# Patient Record
Sex: MALE | Race: WHITE | Employment: OTHER | URBAN - METROPOLITAN AREA
[De-identification: names, ages, dates, MRNs, and addresses within clinical notes are randomized per-mention and may not be internally consistent; named-entity substitution may affect disease eponyms.]

---

## 2017-03-11 DIAGNOSIS — E78.00 HYPERCHOLESTEREMIA: ICD-10-CM

## 2017-03-11 DIAGNOSIS — I10 ESSENTIAL HYPERTENSION, BENIGN: Primary | ICD-10-CM

## 2017-03-13 RX ORDER — ATORVASTATIN CALCIUM 40 MG/1
TABLET, FILM COATED ORAL
Qty: 135 TABLET | Refills: 1 | Status: SHIPPED | OUTPATIENT
Start: 2017-03-13 | End: 2017-09-06

## 2017-03-13 NOTE — TELEPHONE ENCOUNTER
Contacted pt and advised him to do 12 hour fasting labs per [facility]. Pt stated understanding.

## 2017-03-24 ENCOUNTER — APPOINTMENT (OUTPATIENT)
Dept: LAB | Facility: HOSPITAL | Age: 72
End: 2017-03-24
Attending: INTERNAL MEDICINE
Payer: MEDICARE

## 2017-03-24 DIAGNOSIS — E78.00 HYPERCHOLESTEREMIA: ICD-10-CM

## 2017-03-24 DIAGNOSIS — I10 ESSENTIAL HYPERTENSION, BENIGN: ICD-10-CM

## 2017-03-24 LAB
ALBUMIN SERPL BCP-MCNC: 4 G/DL (ref 3.5–4.8)
ALBUMIN/GLOB SERPL: 1.3 {RATIO} (ref 1–2)
ALP SERPL-CCNC: 52 U/L (ref 32–100)
ALT SERPL-CCNC: 24 U/L (ref 17–63)
ANION GAP SERPL CALC-SCNC: 7 MMOL/L (ref 0–18)
AST SERPL-CCNC: 31 U/L (ref 15–41)
BILIRUB SERPL-MCNC: 0.9 MG/DL (ref 0.3–1.2)
BUN SERPL-MCNC: 24 MG/DL (ref 8–20)
BUN/CREAT SERPL: 22.6 (ref 10–20)
CALCIUM SERPL-MCNC: 9.6 MG/DL (ref 8.5–10.5)
CHLORIDE SERPL-SCNC: 106 MMOL/L (ref 95–110)
CHOLEST SERPL-MCNC: 176 MG/DL (ref 110–200)
CO2 SERPL-SCNC: 29 MMOL/L (ref 22–32)
CREAT SERPL-MCNC: 1.06 MG/DL (ref 0.5–1.5)
GLOBULIN PLAS-MCNC: 3 G/DL (ref 2.5–3.7)
GLUCOSE SERPL-MCNC: 105 MG/DL (ref 70–99)
HDLC SERPL-MCNC: 79 MG/DL
LDLC SERPL CALC-MCNC: 91 MG/DL (ref 0–99)
NONHDLC SERPL-MCNC: 97 MG/DL
OSMOLALITY UR CALC.SUM OF ELEC: 298 MOSM/KG (ref 275–295)
POTASSIUM SERPL-SCNC: 4.3 MMOL/L (ref 3.3–5.1)
PROT SERPL-MCNC: 7 G/DL (ref 5.9–8.4)
SODIUM SERPL-SCNC: 142 MMOL/L (ref 136–144)
TRIGL SERPL-MCNC: 29 MG/DL (ref 1–149)

## 2017-03-24 PROCEDURE — 36415 COLL VENOUS BLD VENIPUNCTURE: CPT

## 2017-03-24 PROCEDURE — 80061 LIPID PANEL: CPT

## 2017-03-24 PROCEDURE — 80053 COMPREHEN METABOLIC PANEL: CPT

## 2017-04-05 RX ORDER — VALSARTAN AND HYDROCHLOROTHIAZIDE 160; 12.5 MG/1; MG/1
TABLET, FILM COATED ORAL
Qty: 90 TABLET | Refills: 1 | Status: SHIPPED | OUTPATIENT
Start: 2017-04-05 | End: 2017-10-05

## 2017-07-19 ENCOUNTER — OFFICE VISIT (OUTPATIENT)
Dept: NEPHROLOGY | Facility: CLINIC | Age: 72
End: 2017-07-19

## 2017-07-19 VITALS
DIASTOLIC BLOOD PRESSURE: 72 MMHG | HEIGHT: 73 IN | SYSTOLIC BLOOD PRESSURE: 156 MMHG | BODY MASS INDEX: 25.58 KG/M2 | HEART RATE: 65 BPM | WEIGHT: 193 LBS

## 2017-07-19 DIAGNOSIS — I10 ESSENTIAL HYPERTENSION: Primary | ICD-10-CM

## 2017-07-19 DIAGNOSIS — E78.00 HYPERCHOLESTEROLEMIA: ICD-10-CM

## 2017-07-19 DIAGNOSIS — Z12.5 ENCOUNTER FOR PROSTATE CANCER SCREENING: ICD-10-CM

## 2017-07-19 PROCEDURE — G0463 HOSPITAL OUTPT CLINIC VISIT: HCPCS | Performed by: INTERNAL MEDICINE

## 2017-07-19 PROCEDURE — 99215 OFFICE O/P EST HI 40 MIN: CPT | Performed by: INTERNAL MEDICINE

## 2017-07-19 NOTE — PROGRESS NOTES
[facility]  Nephrology Daily Progress Note    Jojo Mendes  [id number]  73year old      HPI:   Jojo Mendes is a 73year old male.   62-year-old male with a history of hypertension, hypercholesterolemia, basal cell carcinoma, actini murmurs, gallups, or rubs  Abdomen: soft non-tender non-distended no organomegaly noted no masses  Musculoskeletal: full ROM all extremities good strength  no deformities  Extremities: no edema, cyanosis, or clubbing  Neurological: exam appropriate for age

## 2017-08-25 ENCOUNTER — OFFICE VISIT (OUTPATIENT)
Dept: OPTOMETRY | Facility: CLINIC | Age: 72
End: 2017-08-25

## 2017-08-25 DIAGNOSIS — H52.13 MYOPIA, BILATERAL: ICD-10-CM

## 2017-08-25 DIAGNOSIS — H25.13 AGE-RELATED NUCLEAR CATARACT OF BOTH EYES: Primary | ICD-10-CM

## 2017-08-25 PROCEDURE — 92014 COMPRE OPH EXAM EST PT 1/>: CPT | Performed by: OPTOMETRIST

## 2017-08-25 PROCEDURE — 92015 DETERMINE REFRACTIVE STATE: CPT | Performed by: OPTOMETRIST

## 2017-08-25 NOTE — PROGRESS NOTES
Nnamdi Smith is a 81year old male. HPI:     HPI     Patient is in for an annual eye exam. Patient bought new glasses and sunglasses  last year and is happy with them. Has hx of mild cataracts but notes no problems with vision.     Last edited by [facility] Musculoskeletal, HENT, Endocrine, Cardiovascular, Eyes, Respiratory, Psychiatric, Allergic/Imm, Heme/Lymph    Last edited by Inocente Lay, JOSH on 8/25/2017 10:10 AM. (History)          PHYSICAL EXAM:     Base Eye Exam     Visual Acuity (Snellen - Linear) now.      Age-related nuclear cataract of both eyes  No treatment is required. Will continue to observe. No orders of the defined types were placed in this encounter.       Meds This Visit:    No prescriptions requested or ordered in this encounter

## 2017-08-25 NOTE — PATIENT INSTRUCTIONS
Myopia, bilateral  New glasses RX given to update as needed. Patient will stay with what he has for now. Age-related nuclear cataract of both eyes  No treatment is required. Will continue to observe.

## 2017-08-31 ENCOUNTER — TELEPHONE (OUTPATIENT)
Dept: NEPHROLOGY | Facility: CLINIC | Age: 72
End: 2017-08-31

## 2017-08-31 NOTE — TELEPHONE ENCOUNTER
Pt states he has a few spots on his stomach and is not sure if this is the start of shingles. Pt would like to receive a call from RN regarding symptoms and if he should come in for a visit.  Please call [phone] Thank You

## 2017-09-06 RX ORDER — ATORVASTATIN CALCIUM 40 MG/1
TABLET, FILM COATED ORAL
Qty: 135 TABLET | Refills: 0 | Status: SHIPPED | OUTPATIENT
Start: 2017-09-06 | End: 2017-12-04

## 2017-09-12 ENCOUNTER — APPOINTMENT (OUTPATIENT)
Dept: LAB | Facility: HOSPITAL | Age: 72
End: 2017-09-12
Attending: INTERNAL MEDICINE
Payer: MEDICARE

## 2017-10-02 ENCOUNTER — LAB ENCOUNTER (OUTPATIENT)
Dept: LAB | Facility: HOSPITAL | Age: 72
End: 2017-10-02
Attending: INTERNAL MEDICINE
Payer: MEDICARE

## 2017-10-02 DIAGNOSIS — I10 ESSENTIAL HYPERTENSION: ICD-10-CM

## 2017-10-02 DIAGNOSIS — E78.00 HYPERCHOLESTEROLEMIA: ICD-10-CM

## 2017-10-02 DIAGNOSIS — Z12.5 ENCOUNTER FOR PROSTATE CANCER SCREENING: ICD-10-CM

## 2017-10-02 PROCEDURE — 81003 URINALYSIS AUTO W/O SCOPE: CPT

## 2017-10-02 PROCEDURE — 85025 COMPLETE CBC W/AUTO DIFF WBC: CPT

## 2017-10-02 PROCEDURE — 80053 COMPREHEN METABOLIC PANEL: CPT

## 2017-10-02 PROCEDURE — 36415 COLL VENOUS BLD VENIPUNCTURE: CPT

## 2017-10-02 PROCEDURE — 80061 LIPID PANEL: CPT

## 2017-10-06 RX ORDER — VALSARTAN AND HYDROCHLOROTHIAZIDE 160; 12.5 MG/1; MG/1
TABLET, FILM COATED ORAL
Qty: 90 TABLET | Refills: 1 | Status: SHIPPED | OUTPATIENT
Start: 2017-10-06 | End: 2018-03-27

## 2017-12-04 RX ORDER — ATORVASTATIN CALCIUM 40 MG/1
TABLET, FILM COATED ORAL
Qty: 135 TABLET | Refills: 0 | Status: SHIPPED | OUTPATIENT
Start: 2017-12-04 | End: 2018-03-02

## 2017-12-04 NOTE — TELEPHONE ENCOUNTER
Current Outpatient Prescriptions:  ATORVASTATIN 40 MG Oral Tab TAKE ONE AND ONE-HALF TABLETS BY MOUTH NIGHTLY Disp: 135 tablet Rfl: 0

## 2017-12-26 ENCOUNTER — OFFICE VISIT (OUTPATIENT)
Dept: OTOLARYNGOLOGY | Facility: CLINIC | Age: 72
End: 2017-12-26

## 2017-12-26 VITALS
DIASTOLIC BLOOD PRESSURE: 84 MMHG | TEMPERATURE: 97 F | WEIGHT: 188 LBS | BODY MASS INDEX: 24.92 KG/M2 | HEIGHT: 73 IN | SYSTOLIC BLOOD PRESSURE: 155 MMHG

## 2017-12-26 DIAGNOSIS — H61.23 BILATERAL IMPACTED CERUMEN: Primary | ICD-10-CM

## 2017-12-26 PROCEDURE — 69210 REMOVE IMPACTED EAR WAX UNI: CPT | Performed by: OTOLARYNGOLOGY

## 2017-12-26 NOTE — PROGRESS NOTES
Xavi Valdovinos is a 81year old male.   Patient presents with:  Ear Problem: pt thinks he may have a right ear infection since Friday       HISTORY OF PRESENT ILLNESS    Patient presents for cerumen removal. No other complaints or concerns at this time Ht 6' 1\" (1.854 m)   Wt 188 lb (85.3 kg)   BMI 24.80 kg/m²        Constitutional Normal Overall appearance - Normal.        Neck Exam Normal Inspection - Normal. Palpation - Normal. Parotid gland - Normal. Thyroid gland - Normal.             Head/Face Nor cerumen removal in the future. Parker Ricketts.  Nallely Babin MD    12/26/2017    8:03 AM

## 2018-03-04 RX ORDER — ATORVASTATIN CALCIUM 40 MG/1
TABLET, FILM COATED ORAL
Qty: 135 TABLET | Refills: 0 | Status: SHIPPED | OUTPATIENT
Start: 2018-03-04 | End: 2018-05-29

## 2018-03-27 ENCOUNTER — OFFICE VISIT (OUTPATIENT)
Dept: NEPHROLOGY | Facility: CLINIC | Age: 73
End: 2018-03-27

## 2018-03-27 VITALS
HEIGHT: 73.5 IN | SYSTOLIC BLOOD PRESSURE: 145 MMHG | HEART RATE: 75 BPM | DIASTOLIC BLOOD PRESSURE: 72 MMHG | WEIGHT: 192.63 LBS | BODY MASS INDEX: 24.99 KG/M2

## 2018-03-27 DIAGNOSIS — M35.3 PMR (POLYMYALGIA RHEUMATICA) (HCC): ICD-10-CM

## 2018-03-27 DIAGNOSIS — I10 ESSENTIAL HYPERTENSION: Primary | ICD-10-CM

## 2018-03-27 DIAGNOSIS — E78.00 HYPERCHOLESTEROLEMIA: ICD-10-CM

## 2018-03-27 PROCEDURE — 99214 OFFICE O/P EST MOD 30 MIN: CPT | Performed by: INTERNAL MEDICINE

## 2018-03-27 PROCEDURE — G0463 HOSPITAL OUTPT CLINIC VISIT: HCPCS | Performed by: INTERNAL MEDICINE

## 2018-03-27 RX ORDER — VALSARTAN AND HYDROCHLOROTHIAZIDE 160; 12.5 MG/1; MG/1
1 TABLET, FILM COATED ORAL
Qty: 90 TABLET | Refills: 1 | Status: SHIPPED | OUTPATIENT
Start: 2018-03-27 | End: 2018-10-25

## 2018-03-28 ENCOUNTER — LAB ENCOUNTER (OUTPATIENT)
Dept: LAB | Facility: HOSPITAL | Age: 73
End: 2018-03-28
Attending: INTERNAL MEDICINE
Payer: MEDICARE

## 2018-03-28 DIAGNOSIS — E78.00 HYPERCHOLESTEROLEMIA: ICD-10-CM

## 2018-03-28 DIAGNOSIS — M35.3 PMR (POLYMYALGIA RHEUMATICA) (HCC): ICD-10-CM

## 2018-03-28 DIAGNOSIS — I10 ESSENTIAL HYPERTENSION: ICD-10-CM

## 2018-03-28 LAB
ALBUMIN SERPL BCP-MCNC: 3.8 G/DL (ref 3.5–4.8)
ALBUMIN/GLOB SERPL: 1.3 {RATIO} (ref 1–2)
ALP SERPL-CCNC: 60 U/L (ref 32–100)
ALT SERPL-CCNC: 19 U/L (ref 17–63)
ANION GAP SERPL CALC-SCNC: 10 MMOL/L (ref 0–18)
AST SERPL-CCNC: 23 U/L (ref 15–41)
BASOPHILS # BLD: 0 K/UL (ref 0–0.2)
BASOPHILS NFR BLD: 0 %
BILIRUB SERPL-MCNC: 0.9 MG/DL (ref 0.3–1.2)
BUN SERPL-MCNC: 19 MG/DL (ref 8–20)
BUN/CREAT SERPL: 18.8 (ref 10–20)
CALCIUM SERPL-MCNC: 9.6 MG/DL (ref 8.5–10.5)
CHLORIDE SERPL-SCNC: 103 MMOL/L (ref 95–110)
CHOLEST SERPL-MCNC: 173 MG/DL (ref 110–200)
CO2 SERPL-SCNC: 28 MMOL/L (ref 22–32)
CREAT SERPL-MCNC: 1.01 MG/DL (ref 0.5–1.5)
EOSINOPHIL # BLD: 0.1 K/UL (ref 0–0.7)
EOSINOPHIL NFR BLD: 1 %
ERYTHROCYTE [DISTWIDTH] IN BLOOD BY AUTOMATED COUNT: 12.4 % (ref 11–15)
ERYTHROCYTE [SEDIMENTATION RATE] IN BLOOD: 21 MM/HR (ref 0–20)
GLOBULIN PLAS-MCNC: 3 G/DL (ref 2.5–3.7)
GLUCOSE SERPL-MCNC: 111 MG/DL (ref 70–99)
HCT VFR BLD AUTO: 39.4 % (ref 41–52)
HDLC SERPL-MCNC: 105 MG/DL
HGB BLD-MCNC: 13.3 G/DL (ref 13.5–17.5)
LDLC SERPL CALC-MCNC: 63 MG/DL (ref 0–99)
LYMPHOCYTES # BLD: 1.1 K/UL (ref 1–4)
LYMPHOCYTES NFR BLD: 18 %
MCH RBC QN AUTO: 32.4 PG (ref 27–32)
MCHC RBC AUTO-ENTMCNC: 33.8 G/DL (ref 32–37)
MCV RBC AUTO: 95.8 FL (ref 80–100)
MONOCYTES # BLD: 0.6 K/UL (ref 0–1)
MONOCYTES NFR BLD: 9 %
NEUTROPHILS # BLD AUTO: 4.3 K/UL (ref 1.8–7.7)
NEUTROPHILS NFR BLD: 71 %
NONHDLC SERPL-MCNC: 68 MG/DL
OSMOLALITY UR CALC.SUM OF ELEC: 295 MOSM/KG (ref 275–295)
PATIENT FASTING: YES
PLATELET # BLD AUTO: 251 K/UL (ref 140–400)
PMV BLD AUTO: 8.3 FL (ref 7.4–10.3)
POTASSIUM SERPL-SCNC: 4.4 MMOL/L (ref 3.3–5.1)
PROT SERPL-MCNC: 6.8 G/DL (ref 5.9–8.4)
RBC # BLD AUTO: 4.12 M/UL (ref 4.5–5.9)
SODIUM SERPL-SCNC: 141 MMOL/L (ref 136–144)
TRIGL SERPL-MCNC: 25 MG/DL (ref 1–149)
WBC # BLD AUTO: 6 K/UL (ref 4–11)

## 2018-03-28 PROCEDURE — 85025 COMPLETE CBC W/AUTO DIFF WBC: CPT

## 2018-03-28 PROCEDURE — 80061 LIPID PANEL: CPT

## 2018-03-28 PROCEDURE — 85652 RBC SED RATE AUTOMATED: CPT

## 2018-03-28 PROCEDURE — 36415 COLL VENOUS BLD VENIPUNCTURE: CPT

## 2018-03-28 PROCEDURE — 80053 COMPREHEN METABOLIC PANEL: CPT

## 2018-03-29 NOTE — PROGRESS NOTES
[facility], [facility]  Nephrology Daily Progress Note    Juliana Allan  [id number]  81year old  Patient presents with: Well Adult: Medicare Annual Wellness      HPI:   Juliana Allan is a 81year old male.   51-year-old male with a history of hypertension, VITALS: WEIGHT ONLY 7/19/2017 12/26/2017 3/27/2018   Weight 193 lbs 188 lbs 192 lbs 10 oz       Constitutional: appears well hydrated alert and responsive no acute distress noted  Neck/Thyroid: neck is supple without adenopathy  Lymphatic: no abnorma Disp: , Rfl:     Allergies:  No Known Allergies         ASSESSMENT/PLAN:   Assessment   Essential hypertension  (primary encounter diagnosis)  Hypercholesterolemia  Pmr (polymyalgia rheumatica) ([facility])    Patient Active Problem List:     Senile cataract

## 2018-03-31 ENCOUNTER — TELEPHONE (OUTPATIENT)
Dept: NEPHROLOGY | Facility: CLINIC | Age: 73
End: 2018-03-31

## 2018-03-31 DIAGNOSIS — D64.9 ANEMIA, UNSPECIFIED TYPE: Primary | ICD-10-CM

## 2018-05-02 ENCOUNTER — LAB ENCOUNTER (OUTPATIENT)
Dept: LAB | Facility: HOSPITAL | Age: 73
End: 2018-05-02
Attending: INTERNAL MEDICINE
Payer: MEDICARE

## 2018-05-02 DIAGNOSIS — D64.9 ANEMIA, UNSPECIFIED TYPE: ICD-10-CM

## 2018-05-02 PROCEDURE — 85652 RBC SED RATE AUTOMATED: CPT

## 2018-05-02 PROCEDURE — 82607 VITAMIN B-12: CPT

## 2018-05-02 PROCEDURE — 36415 COLL VENOUS BLD VENIPUNCTURE: CPT

## 2018-05-02 PROCEDURE — 84466 ASSAY OF TRANSFERRIN: CPT

## 2018-05-02 PROCEDURE — 83540 ASSAY OF IRON: CPT

## 2018-05-02 PROCEDURE — 85025 COMPLETE CBC W/AUTO DIFF WBC: CPT

## 2018-05-02 PROCEDURE — 82728 ASSAY OF FERRITIN: CPT

## 2018-05-05 ENCOUNTER — TELEPHONE (OUTPATIENT)
Dept: NEPHROLOGY | Facility: CLINIC | Age: 73
End: 2018-05-05

## 2018-05-05 DIAGNOSIS — R20.2 NUMBNESS AND TINGLING IN LEFT HAND: Primary | ICD-10-CM

## 2018-05-05 DIAGNOSIS — R20.0 NUMBNESS AND TINGLING IN LEFT HAND: Primary | ICD-10-CM

## 2018-05-05 DIAGNOSIS — M79.642 HAND PAIN, LEFT: ICD-10-CM

## 2018-05-05 NOTE — TELEPHONE ENCOUNTER
Still with mild anemia but stable. Iron and B12 levels were normal.  Sed rate was a little higher at 27 but still not that high. Ervin Lund he still thinks he might have a flare of polymyalgia have him see Dr. Silvio Blackmon for follow-up.

## 2018-05-07 NOTE — TELEPHONE ENCOUNTER
CALVINTCB. EMG order entered in system.  Requested a call back from patient to provide him with the phone# to schedule EMG ([phone])

## 2018-05-07 NOTE — TELEPHONE ENCOUNTER
Contacted pt. Notified him he is still mildly anemic but stable; iron & B12 levels were normal. Explained sed rate was a little higher at 27 but still not that high.  Advised to follow up with Dr. Monique Das if he thinks he might have a polymyalgia flare u

## 2018-05-07 NOTE — TELEPHONE ENCOUNTER
Patient contacted. Advised of EMG testing ordered by Dr. Tabatha Morrison. Patient has the phone# to call to get this test scheduled.  Provided patient with information about this test and the reason Dr. Tabatha Morrison ordered it is because wrist splint has not helped the

## 2018-05-17 ENCOUNTER — HOSPITAL ENCOUNTER (OUTPATIENT)
Dept: ELECTROPHYSIOLOGY | Facility: HOSPITAL | Age: 73
Discharge: HOME | End: 2018-05-17
Attending: INTERNAL MEDICINE
Payer: MEDICARE

## 2018-05-17 DIAGNOSIS — R20.2 NUMBNESS AND TINGLING IN LEFT HAND: ICD-10-CM

## 2018-05-17 DIAGNOSIS — R20.0 NUMBNESS AND TINGLING IN LEFT HAND: ICD-10-CM

## 2018-05-17 DIAGNOSIS — M79.642 HAND PAIN, LEFT: ICD-10-CM

## 2018-05-17 PROCEDURE — 95885 MUSC TST DONE W/NERV TST LIM: CPT

## 2018-05-17 PROCEDURE — 95909 NRV CNDJ TST 5-6 STUDIES: CPT

## 2018-05-24 ENCOUNTER — TELEPHONE (OUTPATIENT)
Dept: NEPHROLOGY | Facility: CLINIC | Age: 73
End: 2018-05-24

## 2018-05-24 NOTE — TELEPHONE ENCOUNTER
Pt returning rn call and indicates he would really like to sahara singh rn today if possible, pls call at:[phone],thanks.

## 2018-05-24 NOTE — TELEPHONE ENCOUNTER
[facility] received fax with pt's EMG/NCV. In RN bin. Per [facility]: EMG/NCV c/w mod-severe left carpal tunnel. Refer to Dr. Antwon Khan for further eval. LMTCB.

## 2018-05-24 NOTE — TELEPHONE ENCOUNTER
Contacted pt and notified him that his EMG showed moderate-severe carpal tunnel in his left hand. Referred to Dr. Vonda Gonzalez for further evaluation and recommendation for management/treatment. Phone # provided. He will call to schedule appt.

## 2018-05-30 RX ORDER — ATORVASTATIN CALCIUM 40 MG/1
TABLET, FILM COATED ORAL
Qty: 135 TABLET | Refills: 0 | Status: SHIPPED | OUTPATIENT
Start: 2018-05-30 | End: 2018-08-30

## 2018-06-04 ENCOUNTER — OFFICE VISIT (OUTPATIENT)
Dept: SURGERY | Facility: CLINIC | Age: 73
End: 2018-06-04

## 2018-06-04 DIAGNOSIS — G56.02 CARPAL TUNNEL SYNDROME ON LEFT: Primary | ICD-10-CM

## 2018-06-04 PROCEDURE — G0463 HOSPITAL OUTPT CLINIC VISIT: HCPCS | Performed by: PLASTIC SURGERY

## 2018-06-04 PROCEDURE — 99203 OFFICE O/P NEW LOW 30 MIN: CPT | Performed by: PLASTIC SURGERY

## 2018-06-04 RX ORDER — HYDROCODONE BITARTRATE AND ACETAMINOPHEN 7.5; 325 MG/1; MG/1
1 TABLET ORAL
Qty: 10 TABLET | Refills: 0 | Status: SHIPPED | OUTPATIENT
Start: 2018-06-04 | End: 2018-07-16

## 2018-06-04 NOTE — H&P
Jennifer Mcfarlane is a 81year old male that presents with Patient presents with:  Carpal Tunnel Syndrome: LH   .    REFERRED BY:  Anival Ortiz     Pacemaker: No  Latex Allergy: no  Coumadin: No  Plavix: No  Other anticoagulants: No  Cardiac stents: No 160-12.5 MG Oral Tab Take 1 tablet by mouth once daily. Disp: 90 tablet Rfl: 1   TURMERIC OR Take 2 tablets by mouth daily. Disp:  Rfl:    Multiple Vitamin (MULTI-VITAMINS) Oral Tab Take 1 tablet by mouth daily.    Disp:  Rfl:    Omega-3 Fatty Acids (CVS FI rhythm, No murmurs  ABDOMEN: Inspection - Normal, No abdominal tenderness  NEURO: Memory intact  PSYCH: Oriented to person, place, time, and situation, Appropriate mood and affect    Hand Physical Exam:      ROM: bilateral full painless   Wrist Hyperextens and to comply with post-operative instructions. The dressing must be carefully cared for, must remain dry, and cannot be removed under any circumstance.   Consistent elevation of the hand above heart level is critical.  Therapy will be necessary post-opera

## 2018-06-05 ENCOUNTER — TELEPHONE (OUTPATIENT)
Dept: SURGERY | Facility: CLINIC | Age: 73
End: 2018-06-05

## 2018-06-05 DIAGNOSIS — G56.02 CARPAL TUNNEL SYNDROME ON LEFT: Primary | ICD-10-CM

## 2018-06-26 ENCOUNTER — HOSPITAL DOCUMENTATION (OUTPATIENT)
Dept: SURGERY | Facility: CLINIC | Age: 73
End: 2018-06-26

## 2018-06-26 ENCOUNTER — ANESTHESIA EVENT (OUTPATIENT)
Dept: SURGERY | Facility: HOSPITAL | Age: 73
End: 2018-06-26
Payer: MEDICARE

## 2018-06-26 ENCOUNTER — ANESTHESIA (OUTPATIENT)
Dept: SURGERY | Facility: HOSPITAL | Age: 73
End: 2018-06-26
Payer: MEDICARE

## 2018-06-26 ENCOUNTER — HOSPITAL ENCOUNTER (OUTPATIENT)
Facility: HOSPITAL | Age: 73
Setting detail: HOSPITAL OUTPATIENT SURGERY
Discharge: HOME | End: 2018-06-26
Attending: PLASTIC SURGERY | Admitting: PLASTIC SURGERY
Payer: MEDICARE

## 2018-06-26 ENCOUNTER — SURGERY (OUTPATIENT)
Age: 73
End: 2018-06-26

## 2018-06-26 VITALS
OXYGEN SATURATION: 98 % | WEIGHT: 183.19 LBS | HEIGHT: 73 IN | DIASTOLIC BLOOD PRESSURE: 68 MMHG | SYSTOLIC BLOOD PRESSURE: 131 MMHG | BODY MASS INDEX: 24.28 KG/M2 | HEART RATE: 56 BPM | TEMPERATURE: 98 F | RESPIRATION RATE: 16 BRPM

## 2018-06-26 DIAGNOSIS — G56.02 CARPAL TUNNEL SYNDROME ON LEFT: Primary | ICD-10-CM

## 2018-06-26 PROCEDURE — 29848 WRIST ENDOSCOPY/SURGERY: CPT | Performed by: PLASTIC SURGERY

## 2018-06-26 PROCEDURE — 01N54ZZ RELEASE MEDIAN NERVE, PERCUTANEOUS ENDOSCOPIC APPROACH: ICD-10-PCS | Performed by: PLASTIC SURGERY

## 2018-06-26 RX ORDER — HYDROMORPHONE HYDROCHLORIDE 1 MG/ML
0.4 INJECTION, SOLUTION INTRAMUSCULAR; INTRAVENOUS; SUBCUTANEOUS EVERY 5 MIN PRN
Status: DISCONTINUED | OUTPATIENT
Start: 2018-06-26 | End: 2018-06-26

## 2018-06-26 RX ORDER — HYDROMORPHONE HYDROCHLORIDE 1 MG/ML
0.6 INJECTION, SOLUTION INTRAMUSCULAR; INTRAVENOUS; SUBCUTANEOUS EVERY 5 MIN PRN
Status: DISCONTINUED | OUTPATIENT
Start: 2018-06-26 | End: 2018-06-26

## 2018-06-26 RX ORDER — HYDROMORPHONE HYDROCHLORIDE 1 MG/ML
0.2 INJECTION, SOLUTION INTRAMUSCULAR; INTRAVENOUS; SUBCUTANEOUS EVERY 5 MIN PRN
Status: DISCONTINUED | OUTPATIENT
Start: 2018-06-26 | End: 2018-06-26

## 2018-06-26 RX ORDER — SODIUM CHLORIDE, SODIUM LACTATE, POTASSIUM CHLORIDE, CALCIUM CHLORIDE 600; 310; 30; 20 MG/100ML; MG/100ML; MG/100ML; MG/100ML
INJECTION, SOLUTION INTRAVENOUS CONTINUOUS
Status: DISCONTINUED | OUTPATIENT
Start: 2018-06-26 | End: 2018-06-26

## 2018-06-26 RX ORDER — LIDOCAINE HYDROCHLORIDE 10 MG/ML
INJECTION, SOLUTION EPIDURAL; INFILTRATION; INTRACAUDAL; PERINEURAL AS NEEDED
Status: DISCONTINUED | OUTPATIENT
Start: 2018-06-26 | End: 2018-06-26 | Stop reason: SURG

## 2018-06-26 RX ORDER — LIDOCAINE HYDROCHLORIDE 5 MG/ML
INJECTION, SOLUTION INFILTRATION; INTRAVENOUS AS NEEDED
Status: DISCONTINUED | OUTPATIENT
Start: 2018-06-26 | End: 2018-06-26 | Stop reason: SURG

## 2018-06-26 RX ORDER — HYDROCODONE BITARTRATE AND ACETAMINOPHEN 5; 325 MG/1; MG/1
1 TABLET ORAL AS NEEDED
Status: DISCONTINUED | OUTPATIENT
Start: 2018-06-26 | End: 2018-06-26

## 2018-06-26 RX ORDER — METOCLOPRAMIDE 10 MG/1
10 TABLET ORAL ONCE
Status: DISCONTINUED | OUTPATIENT
Start: 2018-06-26 | End: 2018-06-26 | Stop reason: HOSPADM

## 2018-06-26 RX ORDER — FAMOTIDINE 20 MG/1
20 TABLET ORAL ONCE
Status: DISCONTINUED | OUTPATIENT
Start: 2018-06-26 | End: 2018-06-26 | Stop reason: HOSPADM

## 2018-06-26 RX ORDER — HYDROCODONE BITARTRATE AND ACETAMINOPHEN 7.5; 325 MG/1; MG/1
1 TABLET ORAL EVERY 4 HOURS PRN
Status: DISCONTINUED | OUTPATIENT
Start: 2018-06-26 | End: 2018-06-26

## 2018-06-26 RX ORDER — ACETAMINOPHEN 500 MG
1000 TABLET ORAL ONCE
Status: COMPLETED | OUTPATIENT
Start: 2018-06-26 | End: 2018-06-26

## 2018-06-26 RX ORDER — HYDROCODONE BITARTRATE AND ACETAMINOPHEN 5; 325 MG/1; MG/1
2 TABLET ORAL AS NEEDED
Status: DISCONTINUED | OUTPATIENT
Start: 2018-06-26 | End: 2018-06-26

## 2018-06-26 RX ORDER — NALOXONE HYDROCHLORIDE 0.4 MG/ML
80 INJECTION, SOLUTION INTRAMUSCULAR; INTRAVENOUS; SUBCUTANEOUS AS NEEDED
Status: DISCONTINUED | OUTPATIENT
Start: 2018-06-26 | End: 2018-06-26

## 2018-06-26 RX ORDER — HALOPERIDOL 5 MG/ML
0.25 INJECTION INTRAMUSCULAR ONCE AS NEEDED
Status: DISCONTINUED | OUTPATIENT
Start: 2018-06-26 | End: 2018-06-26

## 2018-06-26 RX ORDER — ONDANSETRON 2 MG/ML
4 INJECTION INTRAMUSCULAR; INTRAVENOUS ONCE AS NEEDED
Status: DISCONTINUED | OUTPATIENT
Start: 2018-06-26 | End: 2018-06-26

## 2018-06-26 RX ORDER — MIDAZOLAM HYDROCHLORIDE 1 MG/ML
INJECTION INTRAMUSCULAR; INTRAVENOUS AS NEEDED
Status: DISCONTINUED | OUTPATIENT
Start: 2018-06-26 | End: 2018-06-26 | Stop reason: SURG

## 2018-06-26 RX ORDER — KETOROLAC TROMETHAMINE 30 MG/ML
INJECTION, SOLUTION INTRAMUSCULAR; INTRAVENOUS AS NEEDED
Status: DISCONTINUED | OUTPATIENT
Start: 2018-06-26 | End: 2018-06-26 | Stop reason: SURG

## 2018-06-26 RX ADMIN — SODIUM CHLORIDE, SODIUM LACTATE, POTASSIUM CHLORIDE, CALCIUM CHLORIDE: 600; 310; 30; 20 INJECTION, SOLUTION INTRAVENOUS at 07:25:00

## 2018-06-26 RX ADMIN — SODIUM CHLORIDE, SODIUM LACTATE, POTASSIUM CHLORIDE, CALCIUM CHLORIDE: 600; 310; 30; 20 INJECTION, SOLUTION INTRAVENOUS at 08:05:00

## 2018-06-26 RX ADMIN — LIDOCAINE HYDROCHLORIDE 50 MG: 10 INJECTION, SOLUTION EPIDURAL; INFILTRATION; INTRACAUDAL; PERINEURAL at 07:32:00

## 2018-06-26 RX ADMIN — LIDOCAINE HYDROCHLORIDE 50 ML: 5 INJECTION, SOLUTION INFILTRATION; INTRAVENOUS at 07:32:00

## 2018-06-26 RX ADMIN — KETOROLAC TROMETHAMINE 30 MG: 30 INJECTION, SOLUTION INTRAMUSCULAR; INTRAVENOUS at 07:45:00

## 2018-06-26 RX ADMIN — MIDAZOLAM HYDROCHLORIDE 2 MG: 1 INJECTION INTRAMUSCULAR; INTRAVENOUS at 07:32:00

## 2018-06-26 NOTE — ANESTHESIA PROCEDURE NOTES
Peripheral Block    Anesthesiologist:  Alba Grounds  Performed by:   Anesthesiologist  Patient Location:  OR  Start Time:  6/26/2018 7:28 AM  End Time:  6/26/2018 7:32 AM  Site Identification: surface landmarks    Reason for Block: at surgeon's reque

## 2018-06-26 NOTE — INTERVAL H&P NOTE
Pre-op Diagnosis: carpal tunnel syndrome    The above referenced H&P was reviewed by Dickson Krause MD on 6/26/2018, the patient was examined and no significant changes have occurred in the patient's condition since the H&P was performed.   I discus

## 2018-06-26 NOTE — OPERATIVE REPORT
[facility]    PATIENT'S NAME: ARMAND NEFF   ATTENDING PHYSICIAN: Arleen Koroma MD   OPERATING PHYSICIAN: Arleen Koroma MD   PATIENT ACCOUNT#:   [id number]    LOCATION:  [address]  MEDICAL RECORD #:   [id number] accomplished with a push knife, a triangle knife, and a pull knife. We had excellent herniation of palmar fat into the cannula. The cannula and endoscope were withdrawn.   A curved dissector was used to document complete release of the fascia proximally a

## 2018-06-26 NOTE — BRIEF OP NOTE
Pre-Operative Diagnosis: carpal tunnel syndrome     Post-Operative Diagnosis: carpal tunnel syndrome      Procedure Performed:   Procedure(s):  left endoscopic carpal tunnel release    Surgeon(s) and Role:     * Angie Mak MD - Primary    Swapna

## 2018-06-26 NOTE — ANESTHESIA POSTPROCEDURE EVALUATION
Patient: Mary Fontan    Procedure Summary     Date:  06/26/18 Room / Location:  [address] [place] [address] MAIN OR 01 / [address] [place] [address] OR    Anesthesia Start:  0725 Anesthesia Stop:  0820    Procedure:  ENDOSCOPIC CARPAL TUNNEL RELEASE (Left ) Diagnosis:  (carpal tunnel syndrome)

## 2018-06-26 NOTE — H&P (VIEW-ONLY)
Helga Koehler is a 81year old male that presents with Patient presents with:  Carpal Tunnel Syndrome: LH   .    REFERRED BY:  Carol Portillo     Pacemaker: No  Latex Allergy: no  Coumadin: No  Plavix: No  Other anticoagulants: No  Cardiac stents: No 160-12.5 MG Oral Tab Take 1 tablet by mouth once daily. Disp: 90 tablet Rfl: 1   TURMERIC OR Take 2 tablets by mouth daily. Disp:  Rfl:    Multiple Vitamin (MULTI-VITAMINS) Oral Tab Take 1 tablet by mouth daily.    Disp:  Rfl:    Omega-3 Fatty Acids (CVS FI rhythm, No murmurs  ABDOMEN: Inspection - Normal, No abdominal tenderness  NEURO: Memory intact  PSYCH: Oriented to person, place, time, and situation, Appropriate mood and affect    Hand Physical Exam:      ROM: bilateral full painless   Wrist Hyperextens and to comply with post-operative instructions. The dressing must be carefully cared for, must remain dry, and cannot be removed under any circumstance.   Consistent elevation of the hand above heart level is critical.  Therapy will be necessary post-opera

## 2018-06-26 NOTE — ANESTHESIA PREPROCEDURE EVALUATION
Anesthesia PreOp Note    HPI:     Abi Fan is a 81year old male who presents for preoperative consultation requested by: Inna Eng MD    Date of Surgery: 6/26/2018    Procedure(s):  ENDOSCOPIC CARPAL TUNNEL RELEASE  Indication: carpal Rfl:  Past Month at Unknown time   Omega-3 Fatty Acids (CVS FISH OIL) 1200 MG Oral Cap Take 1 capsule by mouth 2 (two) times daily.    Disp:  Rfl:  Past Month at Unknown time       Current Facility-Administered Medications Ordered in Epic:  lactated ringers 28 03/28/2018   BUN 19 03/28/2018   CREATSERUM 1.01 03/28/2018    (H) 03/28/2018   CA 9.6 03/28/2018          Vital Signs: Body mass index is 24.17 kg/m². height is 1.854 m (6' 1\") and weight is 83.1 kg (183 lb 3 oz). His oral temperature is 98. ROSI  6/26/2018 7:09 AM

## 2018-06-27 ENCOUNTER — TELEPHONE (OUTPATIENT)
Dept: SURGERY | Facility: CLINIC | Age: 73
End: 2018-06-27

## 2018-06-27 ENCOUNTER — OFFICE VISIT (OUTPATIENT)
Dept: SURGERY | Facility: CLINIC | Age: 73
End: 2018-06-27

## 2018-06-27 DIAGNOSIS — M62.81 DISTAL MUSCLE WEAKNESS: ICD-10-CM

## 2018-06-27 DIAGNOSIS — M25.642 STIFFNESS OF JOINT, HAND, LEFT: Primary | ICD-10-CM

## 2018-06-27 NOTE — TELEPHONE ENCOUNTER
Spoke w/pt who states \"I'm fine. I haven't taken any pain medication since I've been home. I did my exercises last night and I've done them already for today. I see the therapist in your office today at 3:30. \"  Pt appt for today at  confirmed and

## 2018-07-10 ENCOUNTER — OFFICE VISIT (OUTPATIENT)
Dept: SURGERY | Facility: CLINIC | Age: 73
End: 2018-07-10

## 2018-07-10 DIAGNOSIS — M25.642 STIFFNESS OF JOINT, HAND, LEFT: Primary | ICD-10-CM

## 2018-07-10 DIAGNOSIS — M62.81 DISTAL MUSCLE WEAKNESS: ICD-10-CM

## 2018-07-10 PROCEDURE — G8987 SELF CARE CURRENT STATUS: HCPCS | Performed by: OCCUPATIONAL THERAPIST

## 2018-07-10 PROCEDURE — 97110 THERAPEUTIC EXERCISES: CPT | Performed by: OCCUPATIONAL THERAPIST

## 2018-07-10 PROCEDURE — 97166 OT EVAL MOD COMPLEX 45 MIN: CPT | Performed by: OCCUPATIONAL THERAPIST

## 2018-07-10 PROCEDURE — 99070 SPECIAL SUPPLIES PHYS/QHP: CPT | Performed by: OCCUPATIONAL THERAPIST

## 2018-07-10 PROCEDURE — G8988 SELF CARE GOAL STATUS: HCPCS | Performed by: OCCUPATIONAL THERAPIST

## 2018-07-10 NOTE — PROGRESS NOTES
OCCUPATIONAL THERAPY EVALUATION:   [facility]   [id number]       SUBJECTIVE:    HX of Injury: LECTR  Chief Complaint:   My left hand still feels a little stiff. .    Precautions: Protected use of the left hand.   Premorbid Functional Status: Independen HEP.      Long term goals to be reached in x 3 - 4 weeks:    1) Full functional use of the involved extremity for self-care, leisure and work related tasks:  . Patient will be seen 1 x /week for 3 weeks or a total of 3 visits.    Pt. was advised sulaiman

## 2018-07-16 ENCOUNTER — OFFICE VISIT (OUTPATIENT)
Dept: SURGERY | Facility: CLINIC | Age: 73
End: 2018-07-16

## 2018-07-16 DIAGNOSIS — G56.02 CARPAL TUNNEL SYNDROME ON LEFT: Primary | ICD-10-CM

## 2018-07-16 DIAGNOSIS — M62.81 DISTAL MUSCLE WEAKNESS: ICD-10-CM

## 2018-07-16 DIAGNOSIS — M25.642 STIFFNESS OF JOINT, HAND, LEFT: Primary | ICD-10-CM

## 2018-07-16 PROCEDURE — 97110 THERAPEUTIC EXERCISES: CPT | Performed by: OCCUPATIONAL THERAPIST

## 2018-07-16 PROCEDURE — 99024 POSTOP FOLLOW-UP VISIT: CPT | Performed by: PLASTIC SURGERY

## 2018-07-16 PROCEDURE — G0463 HOSPITAL OUTPT CLINIC VISIT: HCPCS | Performed by: PLASTIC SURGERY

## 2018-07-16 NOTE — PROGRESS NOTES
Surgery 1: LECTR  - Date: 06/26/18  - Days Since: 20    Pt here for post-op visit. Pt states he only has pain if surgical site pressed on. Pt c/o edema, tightness and weakness to . Pt is performing Eucerin massages. Scars to [facility] are healing well.   [facility]

## 2018-07-16 NOTE — PROGRESS NOTES
Subjective: The left hand is still swollen. Objective:     Current level of performance:  ADL: Independent with all self care tasks. Work: Retired .   Leisure: Guitar    Measurements/Tests:  ROM:  Testing By: Juan Luis Lund Strength Right: 65

## 2018-07-30 ENCOUNTER — OFFICE VISIT (OUTPATIENT)
Dept: SURGERY | Facility: CLINIC | Age: 73
End: 2018-07-30
Payer: MEDICARE

## 2018-07-30 DIAGNOSIS — M25.642 STIFFNESS OF JOINT, HAND, LEFT: Primary | ICD-10-CM

## 2018-07-30 DIAGNOSIS — M62.81 DISTAL MUSCLE WEAKNESS: ICD-10-CM

## 2018-07-30 PROCEDURE — G8988 SELF CARE GOAL STATUS: HCPCS | Performed by: OCCUPATIONAL THERAPIST

## 2018-07-30 PROCEDURE — G8995 SUB PT/OT D/C STATUS: HCPCS | Performed by: OCCUPATIONAL THERAPIST

## 2018-07-30 NOTE — PROGRESS NOTES
Subjective: My hand is still swollen.       Objective:     Current level of performance:  ADL: Independent with self   Work: Retired   Leisure: Guitar    Measurements/Tests:  ROM:  Testing By: shara   Strength Right: 65 #      Strength L

## 2018-08-23 ENCOUNTER — OFFICE VISIT (OUTPATIENT)
Dept: OPTOMETRY | Facility: CLINIC | Age: 73
End: 2018-08-23
Payer: MEDICARE

## 2018-08-23 DIAGNOSIS — H25.13 AGE-RELATED NUCLEAR CATARACT OF BOTH EYES: Primary | ICD-10-CM

## 2018-08-23 DIAGNOSIS — H52.13 MYOPIA, BILATERAL: ICD-10-CM

## 2018-08-23 PROCEDURE — 92015 DETERMINE REFRACTIVE STATE: CPT | Performed by: OPTOMETRIST

## 2018-08-23 PROCEDURE — 92012 INTRM OPH EXAM EST PATIENT: CPT | Performed by: OPTOMETRIST

## 2018-08-23 NOTE — PROGRESS NOTES
Loni Husbands is a 72year old male. HPI:     HPI     Patient is in for an annual eye exam. Having a hard time seeing TV lately. Has a hx of cataracts and increasing myopia as a result.     Last edited by Eboni Milton, OD on 8/23/2018 12:06 PM. (Histor Allergic/Imm, Heme/Lymph    Last edited by Beth Skinner, OD on 8/23/2018 10:35 AM. (History)          PHYSICAL EXAM:     Base Eye Exam     Visual Acuity (Snellen - Linear)       Right Left    Dist cc 20/50 20/40    Near cc 4pt 4pt    Correction:  Glasses prescriptions requested or ordered in this encounter     Follow up instructions:  Return in about 1 year (around 8/23/2019) for Dilated exam, Cataract Evaluation.     8/23/2018  Scribed by: Juan R Burris

## 2018-08-23 NOTE — PATIENT INSTRUCTIONS
Myopia, bilateral  New glasses RX given to update as needed. Age-related nuclear cataract of both eyes  No treatment is required. Will continue to observe.

## 2018-08-30 DIAGNOSIS — E78.00 PURE HYPERCHOLESTEROLEMIA: Primary | ICD-10-CM

## 2018-08-30 DIAGNOSIS — Z12.5 PROSTATE CANCER SCREENING: ICD-10-CM

## 2018-08-31 RX ORDER — ATORVASTATIN CALCIUM 40 MG/1
TABLET, FILM COATED ORAL
Qty: 135 TABLET | Refills: 0 | Status: SHIPPED | OUTPATIENT
Start: 2018-08-31 | End: 2018-11-26

## 2018-09-06 NOTE — TELEPHONE ENCOUNTER
LMTCB. Orders entered in system to be done in October. 12 hour fast and patient needs to schedule a follow up visit.  Last PSA was done 10/2/17 so patient should do lab work after this date or Medicare will not cover the cost.

## 2018-09-06 NOTE — TELEPHONE ENCOUNTER
Patient contacted. Aware to get lab work done in October.  Appointment with Dr. César Woody booked for Friday 10/19/18 at 1:20 pm.

## 2018-09-12 ENCOUNTER — OFFICE VISIT (OUTPATIENT)
Dept: RHEUMATOLOGY | Facility: CLINIC | Age: 73
End: 2018-09-12
Payer: MEDICARE

## 2018-09-12 ENCOUNTER — APPOINTMENT (OUTPATIENT)
Dept: LAB | Facility: HOSPITAL | Age: 73
End: 2018-09-12
Attending: INTERNAL MEDICINE
Payer: MEDICARE

## 2018-09-12 VITALS
SYSTOLIC BLOOD PRESSURE: 122 MMHG | WEIGHT: 183 LBS | DIASTOLIC BLOOD PRESSURE: 73 MMHG | HEIGHT: 73.5 IN | HEART RATE: 66 BPM | BODY MASS INDEX: 23.74 KG/M2

## 2018-09-12 DIAGNOSIS — M79.10 MYALGIA: ICD-10-CM

## 2018-09-12 DIAGNOSIS — R70.0 ELEVATED SED RATE: ICD-10-CM

## 2018-09-12 DIAGNOSIS — M25.50 POLYARTHRALGIA: ICD-10-CM

## 2018-09-12 DIAGNOSIS — M25.50 POLYARTHRALGIA: Primary | ICD-10-CM

## 2018-09-12 LAB
CK SERPL-CCNC: 114 U/L (ref 49–397)
CRP SERPL-MCNC: 0.6 MG/DL (ref 0–0.9)
ERYTHROCYTE [SEDIMENTATION RATE] IN BLOOD: 11 MM/HR (ref 0–20)
RHEUMATOID FACT SER QL: <5 IU/ML

## 2018-09-12 PROCEDURE — 86140 C-REACTIVE PROTEIN: CPT

## 2018-09-12 PROCEDURE — 99204 OFFICE O/P NEW MOD 45 MIN: CPT | Performed by: INTERNAL MEDICINE

## 2018-09-12 PROCEDURE — 82550 ASSAY OF CK (CPK): CPT

## 2018-09-12 PROCEDURE — 36415 COLL VENOUS BLD VENIPUNCTURE: CPT

## 2018-09-12 PROCEDURE — G0463 HOSPITAL OUTPT CLINIC VISIT: HCPCS | Performed by: INTERNAL MEDICINE

## 2018-09-12 PROCEDURE — 86431 RHEUMATOID FACTOR QUANT: CPT

## 2018-09-12 PROCEDURE — 85652 RBC SED RATE AUTOMATED: CPT

## 2018-09-12 PROCEDURE — 86200 CCP ANTIBODY: CPT

## 2018-09-12 RX ORDER — PREDNISONE 2.5 MG
TABLET ORAL
Qty: 37 TABLET | Refills: 0 | Status: SHIPPED | OUTPATIENT
Start: 2018-09-12 | End: 2018-10-19 | Stop reason: ALTCHOICE

## 2018-09-12 NOTE — PROGRESS NOTES
Hoa Foster is a 72year old male who presents for Patient presents with:  Joint Pain: past dx. PMR, stiffness in the AM,difficult to get up when doing yard work  . HPI:     I had the pleasure of seeing Hoa Foster on 9/12/2018 for evaluation. Visual impairment     glasses      Past Surgical History:  No date: COLONOSCOPY  06/26/2018: WRIST ARTHROSCOP,RELEASE Geovanny OLVERA; Left      Comment:  Left endoscopic carpal tunnel release   Family History   Problem Relation Age of Onset   • Cancer Mother rashes  CVS: RRR, no murmurs  RS: CTAB, no crackles, no rhonchi  ABD: Soft Non tender, no HSM felt, BS positive  Joint exam:   Shoulders difficutly to raise - but this is crhonic -   No jaw tendneress  No temple tendnerss  No tendnerss in hips or knees to 0.5 - 1.7 g/dL  0.97   ALBUMIN/GLOBULIN RATIO      1.0 - 2.0  1.28   SPE INTERPRETATION        See Comment   C-Citrullinated Peptide IgG AB      0.0 - 6.9 U/ML 1.7    RHEUMATOID FACTOR      <14 IU/mL  10   COMPLEMENT C4      18 - 55 mg/dL  41   COMPLEMENT MD  9/12/2018  4:05 PM

## 2018-09-12 NOTE — PATIENT INSTRUCTIONS
1. Check labs   2. Prednisone 2.5mg a day - take 5mg aday x 1 week, then 2.5mg x 1 week, then can go off -   3. Return to clinic in 2-3 weeks.

## 2018-09-14 LAB — CCP IGG SERPL-ACNC: 0.8 U/ML (ref 0–6.9)

## 2018-10-09 ENCOUNTER — OFFICE VISIT (OUTPATIENT)
Dept: RHEUMATOLOGY | Facility: CLINIC | Age: 73
End: 2018-10-09
Payer: MEDICARE

## 2018-10-09 VITALS
DIASTOLIC BLOOD PRESSURE: 70 MMHG | SYSTOLIC BLOOD PRESSURE: 144 MMHG | WEIGHT: 185 LBS | BODY MASS INDEX: 24 KG/M2 | HEART RATE: 67 BPM | HEIGHT: 73.5 IN

## 2018-10-09 DIAGNOSIS — M25.50 POLYARTHRALGIA: Primary | ICD-10-CM

## 2018-10-09 DIAGNOSIS — R70.0 ELEVATED SED RATE: ICD-10-CM

## 2018-10-09 PROCEDURE — G0463 HOSPITAL OUTPT CLINIC VISIT: HCPCS | Performed by: INTERNAL MEDICINE

## 2018-10-09 PROCEDURE — 99213 OFFICE O/P EST LOW 20 MIN: CPT | Performed by: INTERNAL MEDICINE

## 2018-10-09 NOTE — PROGRESS NOTES
Kathia Montejo is a 72year old male who presents for Patient presents with:  Joint Pain  . HPI:     I had the pleasure of seeing Kathia Montejo on 9/12/2018 for evaluation. He was dx with PMR in 2012 and did well.    But in the last 3-4 months, MG Oral Tab Take 5mg a day x 1 week, then 2.5mg a day Disp: 37 tablet Rfl: 0      Past Medical History:   Diagnosis Date   • Actinic keratosis    • Acute carpal tunnel syndrome of left wrist 06/04/2018   • BCC (basal cell carcinoma of skin)    • Calculus o seizures,   Psych: no hx of anxiety or depression  ENDO: no hx of thyroid disease, no hx of DM  Joint/Muscluskeltal: see HPI,   All other ROS are negative.        EXAM:   /70 (BP Location: Right arm, Patient Position: Sitting, Cuff Size: adult)   Puls 3/28/2018 5/2/2013 11/1/2012   SED RATE      0 - 20 mm/Hr 27 (H) 21 (H) 5 7     Component      Latest Ref Rng & Units 8/4/2012   SED RATE      0 - 20 mm/Hr 77 (H)       Component      Latest Ref Rng & Units 8/20/2012 8/13/2012   TOTAL PROTEIN      6.5 - 9. as  discussed. Small joint effusion. Tenosynovitis of the biceps tendon  Suggested. ASSESSMENT AND PLAN:   Jay Lechuga is a 72year old male who presents for Patient presents with:  Joint Pain      1.  Hx of PMR - , increaseing stiffness -but repeat

## 2018-10-15 ENCOUNTER — LAB ENCOUNTER (OUTPATIENT)
Dept: LAB | Facility: HOSPITAL | Age: 73
End: 2018-10-15
Attending: INTERNAL MEDICINE
Payer: MEDICARE

## 2018-10-15 DIAGNOSIS — E78.00 PURE HYPERCHOLESTEROLEMIA: ICD-10-CM

## 2018-10-15 DIAGNOSIS — Z12.5 PROSTATE CANCER SCREENING: ICD-10-CM

## 2018-10-15 PROCEDURE — 81003 URINALYSIS AUTO W/O SCOPE: CPT

## 2018-10-15 PROCEDURE — 85025 COMPLETE CBC W/AUTO DIFF WBC: CPT

## 2018-10-15 PROCEDURE — 36415 COLL VENOUS BLD VENIPUNCTURE: CPT

## 2018-10-15 PROCEDURE — 80061 LIPID PANEL: CPT

## 2018-10-15 PROCEDURE — 80053 COMPREHEN METABOLIC PANEL: CPT

## 2018-10-19 ENCOUNTER — OFFICE VISIT (OUTPATIENT)
Dept: NEPHROLOGY | Facility: CLINIC | Age: 73
End: 2018-10-19
Payer: MEDICARE

## 2018-10-19 ENCOUNTER — TELEPHONE (OUTPATIENT)
Dept: GASTROENTEROLOGY | Facility: CLINIC | Age: 73
End: 2018-10-19

## 2018-10-19 VITALS
WEIGHT: 186 LBS | TEMPERATURE: 98 F | SYSTOLIC BLOOD PRESSURE: 112 MMHG | HEART RATE: 76 BPM | HEIGHT: 73 IN | BODY MASS INDEX: 24.65 KG/M2 | DIASTOLIC BLOOD PRESSURE: 67 MMHG

## 2018-10-19 DIAGNOSIS — I10 ESSENTIAL HYPERTENSION: ICD-10-CM

## 2018-10-19 DIAGNOSIS — E78.00 PURE HYPERCHOLESTEROLEMIA: Primary | ICD-10-CM

## 2018-10-19 PROCEDURE — G0463 HOSPITAL OUTPT CLINIC VISIT: HCPCS | Performed by: INTERNAL MEDICINE

## 2018-10-19 PROCEDURE — 90653 IIV ADJUVANT VACCINE IM: CPT | Performed by: INTERNAL MEDICINE

## 2018-10-19 PROCEDURE — 99215 OFFICE O/P EST HI 40 MIN: CPT | Performed by: INTERNAL MEDICINE

## 2018-10-19 PROCEDURE — G0008 ADMIN INFLUENZA VIRUS VAC: HCPCS | Performed by: INTERNAL MEDICINE

## 2018-10-19 NOTE — TELEPHONE ENCOUNTER
Pt was in to see Dr. César Woody and stopped by nurses station asking when he is due for his next colonoscopy with Dr. Sunshine Burgess. I did not see anything in Epic. Pt states his last one was maybe 8 years ago but was not sure.     Dr. Dalila Jones - are you able to see

## 2018-10-19 NOTE — TELEPHONE ENCOUNTER
The patient underwent a screening colonoscopy in June 2010 that was normal.  In the absence of any new symptoms or signs, he would be due for a screening colonoscopy in June 2020. May enter in recall.

## 2018-10-20 NOTE — PROGRESS NOTES
[facility], [facility]  Nephrology Daily Progress Note    Abi Fan  [id number]  72year old  Patient presents with:  Hypertension: Lab test review. Would like flu shot today.    Hypercholesterolemia: Atorvastatin pills are becoming difficult to cut in half INDEX - 24.54 -       VITALS: WEIGHT ONLY 9/12/2018 10/9/2018 10/19/2018   Weight 183 lbs 185 lbs 186 lbs       Constitutional: appears well hydrated alert and responsive no acute distress noted  Neck/Thyroid: neck is supple without adenopathy  Lymphatic: Vitamin (MULTI-VITAMINS) Oral Tab, Take 1 tablet by mouth daily. , Disp: , Rfl:   •  Omega-3 Fatty Acids (CVS FISH OIL) 1200 MG Oral Cap, Take 1 capsule by mouth daily.   , Disp: , Rfl:     Allergies:  No Known Allergies         ASSESSMENT/PLAN:   Assessme

## 2018-10-25 RX ORDER — VALSARTAN AND HYDROCHLOROTHIAZIDE 160; 12.5 MG/1; MG/1
1 TABLET, FILM COATED ORAL
Qty: 90 TABLET | Refills: 1 | Status: SHIPPED | OUTPATIENT
Start: 2018-10-25 | End: 2019-04-08

## 2018-11-04 NOTE — PROGRESS NOTES
Carpal Tunnel Post - Op Note:    Subjective: I have no pain.       Objective:  Occupational Therapy completed the following educational areas status post elective carpal tunnel procedure:    1)  Dressing was removed and handwashing technique was reviewed us
No

## 2018-11-26 RX ORDER — ATORVASTATIN CALCIUM 40 MG/1
TABLET, FILM COATED ORAL
Qty: 135 TABLET | Refills: 1 | Status: SHIPPED | OUTPATIENT
Start: 2018-11-26 | End: 2019-05-29

## 2019-04-08 ENCOUNTER — TELEPHONE (OUTPATIENT)
Dept: NEPHROLOGY | Facility: CLINIC | Age: 74
End: 2019-04-08

## 2019-04-08 DIAGNOSIS — E78.00 PURE HYPERCHOLESTEROLEMIA: Primary | ICD-10-CM

## 2019-04-08 NOTE — TELEPHONE ENCOUNTER
Received fax and scanned in documents for patient from [facility].

## 2019-04-09 RX ORDER — VALSARTAN AND HYDROCHLOROTHIAZIDE 160; 12.5 MG/1; MG/1
1 TABLET, FILM COATED ORAL
Qty: 90 TABLET | Refills: 0 | Status: SHIPPED | OUTPATIENT
Start: 2019-04-09 | End: 2019-07-09

## 2019-04-09 NOTE — TELEPHONE ENCOUNTER
Put lab work in. Pt to call us back to set up his 6 month appointment. When he calls back advise patient to have blood work done before his appointment.

## 2019-04-10 ENCOUNTER — TELEPHONE (OUTPATIENT)
Dept: NEPHROLOGY | Facility: CLINIC | Age: 74
End: 2019-04-10

## 2019-04-10 NOTE — TELEPHONE ENCOUNTER
Pt returned call, relayed message below. appt scheduled for 4/22/19 at 1520. Pt verbalized agreement and understanding to have labs done. No further action required at this time.

## 2019-04-12 ENCOUNTER — APPOINTMENT (OUTPATIENT)
Dept: LAB | Facility: HOSPITAL | Age: 74
End: 2019-04-12
Attending: INTERNAL MEDICINE
Payer: MEDICARE

## 2019-04-12 DIAGNOSIS — E78.00 PURE HYPERCHOLESTEROLEMIA: ICD-10-CM

## 2019-04-12 PROCEDURE — 80061 LIPID PANEL: CPT

## 2019-04-12 PROCEDURE — 36415 COLL VENOUS BLD VENIPUNCTURE: CPT

## 2019-04-12 PROCEDURE — 80053 COMPREHEN METABOLIC PANEL: CPT

## 2019-04-13 ENCOUNTER — TELEPHONE (OUTPATIENT)
Dept: NEPHROLOGY | Facility: CLINIC | Age: 74
End: 2019-04-13

## 2019-04-22 ENCOUNTER — OFFICE VISIT (OUTPATIENT)
Dept: NEPHROLOGY | Facility: CLINIC | Age: 74
End: 2019-04-22
Payer: MEDICARE

## 2019-04-22 ENCOUNTER — HOSPITAL ENCOUNTER (OUTPATIENT)
Dept: GENERAL RADIOLOGY | Facility: HOSPITAL | Age: 74
Discharge: HOME | End: 2019-04-22
Attending: INTERNAL MEDICINE
Payer: MEDICARE

## 2019-04-22 VITALS
DIASTOLIC BLOOD PRESSURE: 73 MMHG | SYSTOLIC BLOOD PRESSURE: 128 MMHG | WEIGHT: 187 LBS | HEIGHT: 73 IN | BODY MASS INDEX: 24.78 KG/M2 | HEART RATE: 70 BPM

## 2019-04-22 DIAGNOSIS — E78.00 PURE HYPERCHOLESTEROLEMIA: ICD-10-CM

## 2019-04-22 DIAGNOSIS — M19.90 OSTEOARTHRITIS, UNSPECIFIED OSTEOARTHRITIS TYPE, UNSPECIFIED SITE: ICD-10-CM

## 2019-04-22 DIAGNOSIS — I10 ESSENTIAL HYPERTENSION: Primary | ICD-10-CM

## 2019-04-22 PROCEDURE — 73560 X-RAY EXAM OF KNEE 1 OR 2: CPT | Performed by: INTERNAL MEDICINE

## 2019-04-22 PROCEDURE — 73502 X-RAY EXAM HIP UNI 2-3 VIEWS: CPT | Performed by: INTERNAL MEDICINE

## 2019-04-22 PROCEDURE — G0463 HOSPITAL OUTPT CLINIC VISIT: HCPCS | Performed by: INTERNAL MEDICINE

## 2019-04-22 PROCEDURE — 73565 X-RAY EXAM OF KNEES: CPT | Performed by: INTERNAL MEDICINE

## 2019-04-22 PROCEDURE — 99214 OFFICE O/P EST MOD 30 MIN: CPT | Performed by: INTERNAL MEDICINE

## 2019-04-23 ENCOUNTER — TELEPHONE (OUTPATIENT)
Dept: NEPHROLOGY | Facility: CLINIC | Age: 74
End: 2019-04-23

## 2019-04-23 NOTE — TELEPHONE ENCOUNTER
Xrays of knees shows arthritis R>L. 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 Can just live with it and use Tylenol prn but if he wants anything further done refer to ortho.

## 2019-04-23 NOTE — TELEPHONE ENCOUNTER
That is up to him but it is really not going to help the arthritis get better. May just help with discomfort.

## 2019-04-23 NOTE — TELEPHONE ENCOUNTER
Patient contacted. Results message read to patient. He asked about medical marijuana if this is something he could use. Aware also to see Ortho or take Tylenol.

## 2019-04-23 NOTE — TELEPHONE ENCOUNTER
[place] patient needs a prescription or recommendation from medical doctor. Would you be willing to do this? No

## 2019-05-29 RX ORDER — ATORVASTATIN CALCIUM 40 MG/1
TABLET, FILM COATED ORAL
Qty: 135 TABLET | Refills: 1 | Status: SHIPPED | OUTPATIENT
Start: 2019-05-29 | End: 2019-11-12

## 2019-07-09 RX ORDER — VALSARTAN AND HYDROCHLOROTHIAZIDE 160; 12.5 MG/1; MG/1
1 TABLET, FILM COATED ORAL
Qty: 90 TABLET | Refills: 1 | Status: SHIPPED | OUTPATIENT
Start: 2019-07-09 | End: 2019-12-30

## 2019-07-16 NOTE — TELEPHONE ENCOUNTER
Chol good but FBS borderline high. Watch diet. Sed rate only 21. Normal up to 20 and not high enough to dx polymyalgia rheumatica. It was 77 when he was diagnosed in the past. Also borderline anemic.   In 1 mo repeat CBC, Vit B12, iron, TIBC, ferritin an
Patient contacted. Results message from Dr. Sara Stevenson read to patient. He is aware to do additional lab work in 1 month as Dr. Sara Stevenson advised. He will also watch his diet (carbs and excess sweets) Lab orders entered in system.
No

## 2019-08-22 ENCOUNTER — OFFICE VISIT (OUTPATIENT)
Dept: OTOLARYNGOLOGY | Facility: CLINIC | Age: 74
End: 2019-08-22
Payer: MEDICARE

## 2019-08-22 VITALS
HEIGHT: 73 IN | WEIGHT: 185 LBS | SYSTOLIC BLOOD PRESSURE: 131 MMHG | TEMPERATURE: 98 F | DIASTOLIC BLOOD PRESSURE: 78 MMHG | BODY MASS INDEX: 24.52 KG/M2

## 2019-08-22 DIAGNOSIS — H61.23 BILATERAL IMPACTED CERUMEN: Primary | ICD-10-CM

## 2019-08-22 PROCEDURE — 69210 REMOVE IMPACTED EAR WAX UNI: CPT | Performed by: OTOLARYNGOLOGY

## 2019-08-22 NOTE — PROGRESS NOTES
Jazlyn Andrews is a 69year old male.   Patient presents with:  Ear Problem: pt presents with bilateral cerumen impaction, feels slight pain in right ear       HISTORY OF PRESENT ILLNESS    Patient presents for cerumen removal. No other complaints or conc SYSTEMS    System Neg/Pos Details   Constitutional Negative Fatigue, fever and weight loss. ENMT Negative Drooling. Eyes Negative Blurred vision and vision changes. Respiratory Negative Dyspnea and wheezing.    Cardio Negative Chest pain, irregular he BY MOUTH ONCE DAILY, Disp: 90 tablet, Rfl: 1  •  ATORVASTATIN 40 MG Oral Tab, TAKE ONE AND ONE-HALF TABLETS BY MOUTH NIGHTLY., Disp: 135 tablet, Rfl: 1  •  TURMERIC OR, Take 2 tablets by mouth daily. , Disp: , Rfl:   •  Omega-3 Fatty Acids (CVS FISH OIL) 12

## 2019-08-23 ENCOUNTER — TELEPHONE (OUTPATIENT)
Dept: OPTOMETRY | Facility: CLINIC | Age: 74
End: 2019-08-23

## 2019-08-23 NOTE — TELEPHONE ENCOUNTER
Pt has yearly eye exam scheduled for 08/27/19, requesting to speak to directly to  to see it if is necessary to dilate his eyes during appt. States he needs to know prior to the appt. Pls advise, thank you.

## 2019-08-27 ENCOUNTER — OFFICE VISIT (OUTPATIENT)
Dept: OPTOMETRY | Facility: CLINIC | Age: 74
End: 2019-08-27
Payer: MEDICARE

## 2019-08-27 DIAGNOSIS — H52.13 MYOPIA, BILATERAL: ICD-10-CM

## 2019-08-27 DIAGNOSIS — H25.13 AGE-RELATED NUCLEAR CATARACT OF BOTH EYES: Primary | ICD-10-CM

## 2019-08-27 PROCEDURE — 92015 DETERMINE REFRACTIVE STATE: CPT | Performed by: OPTOMETRIST

## 2019-08-27 PROCEDURE — 92012 INTRM OPH EXAM EST PATIENT: CPT | Performed by: OPTOMETRIST

## 2019-08-27 NOTE — PROGRESS NOTES
Kamila Rojas is a 69year old male. HPI:     HPI     Patient is in for an annual eye exam----has a hx of developing cataracts and increasing myopia.  He does not want to be dilated today    Last edited by Lj Zuniga, OD on 8/27/2019  1:44 PM. ([facility], Allergic/Imm, Heme/Lymph    Last edited by Oracio Colon, OD on 8/27/2019  1:01 PM. (History)          PHYSICAL EXAM:     Base Eye Exam     Visual Acuity (Snellen - Linear)       Right Left    Dist cc 20/20 -3 20/20 -3    Near cc 4pt 4pt    Shannan instructions:  Return for Dilated exam.    8/27/2019  Scribed by: Dustin Santiago

## 2019-10-04 ENCOUNTER — IMMUNIZATION (OUTPATIENT)
Dept: NEPHROLOGY | Facility: CLINIC | Age: 74
End: 2019-10-04
Payer: MEDICARE

## 2019-10-04 DIAGNOSIS — Z23 NEED FOR VACCINATION: ICD-10-CM

## 2019-10-04 PROCEDURE — G0008 ADMIN INFLUENZA VIRUS VAC: HCPCS | Performed by: INTERNAL MEDICINE

## 2019-10-04 PROCEDURE — 90662 IIV NO PRSV INCREASED AG IM: CPT | Performed by: INTERNAL MEDICINE

## 2019-11-12 DIAGNOSIS — E78.00 PURE HYPERCHOLESTEROLEMIA: ICD-10-CM

## 2019-11-12 DIAGNOSIS — I10 ESSENTIAL HYPERTENSION: Primary | ICD-10-CM

## 2019-11-12 DIAGNOSIS — Z12.5 PROSTATE CANCER SCREENING: ICD-10-CM

## 2019-11-12 RX ORDER — ATORVASTATIN CALCIUM 40 MG/1
TABLET, FILM COATED ORAL
Qty: 135 TABLET | Refills: 0 | Status: SHIPPED | OUTPATIENT
Start: 2019-11-12 | End: 2020-02-05

## 2019-11-12 NOTE — TELEPHONE ENCOUNTER
LOV 4/22/19. RTC in 6 mos (10/2019) No f/u scheduled. Last lipid panel was done on 4/12/19.   Refill pended and routed to Dr. Michelle Elizabeth for approval.

## 2019-12-19 ENCOUNTER — LAB ENCOUNTER (OUTPATIENT)
Dept: LAB | Facility: HOSPITAL | Age: 74
End: 2019-12-19
Attending: INTERNAL MEDICINE
Payer: MEDICARE

## 2019-12-19 DIAGNOSIS — I10 ESSENTIAL HYPERTENSION: ICD-10-CM

## 2019-12-19 DIAGNOSIS — E78.00 PURE HYPERCHOLESTEROLEMIA: ICD-10-CM

## 2019-12-19 DIAGNOSIS — Z12.5 PROSTATE CANCER SCREENING: ICD-10-CM

## 2019-12-19 PROCEDURE — 81003 URINALYSIS AUTO W/O SCOPE: CPT

## 2019-12-19 PROCEDURE — 85025 COMPLETE CBC W/AUTO DIFF WBC: CPT

## 2019-12-19 PROCEDURE — 80061 LIPID PANEL: CPT

## 2019-12-19 PROCEDURE — 80053 COMPREHEN METABOLIC PANEL: CPT

## 2019-12-19 PROCEDURE — 36415 COLL VENOUS BLD VENIPUNCTURE: CPT

## 2019-12-22 ENCOUNTER — TELEPHONE (OUTPATIENT)
Dept: NEPHROLOGY | Facility: CLINIC | Age: 74
End: 2019-12-22

## 2019-12-22 NOTE — TELEPHONE ENCOUNTER
Labs are good except cholesterol is too high. Is he taking Lipitor as ordered? ? Follow-up as scheduled.

## 2019-12-23 NOTE — TELEPHONE ENCOUNTER
Patient contacted. Results message read. Patient states yes he is taking Lipitor as prescribed. Will keep follow up appointment scheduled on 1/14/2020. Appointment confirmed.

## 2019-12-30 RX ORDER — VALSARTAN AND HYDROCHLOROTHIAZIDE 160; 12.5 MG/1; MG/1
1 TABLET, FILM COATED ORAL
Qty: 90 TABLET | Refills: 0 | Status: SHIPPED | OUTPATIENT
Start: 2019-12-30 | End: 2020-04-03

## 2019-12-30 NOTE — TELEPHONE ENCOUNTER
LOV 4/22/19. RTC in 6 mos (10/2019) F/U scheduled for 1/14/2020.  Refill pended and routed to Dr. Torres Mota for approval.

## 2020-01-14 ENCOUNTER — OFFICE VISIT (OUTPATIENT)
Dept: NEPHROLOGY | Facility: CLINIC | Age: 75
End: 2020-01-14
Payer: MEDICARE

## 2020-01-14 VITALS
HEART RATE: 73 BPM | BODY MASS INDEX: 25.42 KG/M2 | HEIGHT: 73 IN | WEIGHT: 191.81 LBS | SYSTOLIC BLOOD PRESSURE: 137 MMHG | DIASTOLIC BLOOD PRESSURE: 71 MMHG

## 2020-01-14 DIAGNOSIS — M19.90 OSTEOARTHRITIS, UNSPECIFIED OSTEOARTHRITIS TYPE, UNSPECIFIED SITE: ICD-10-CM

## 2020-01-14 DIAGNOSIS — E78.00 PURE HYPERCHOLESTEROLEMIA: ICD-10-CM

## 2020-01-14 DIAGNOSIS — I10 ESSENTIAL HYPERTENSION: Primary | ICD-10-CM

## 2020-01-14 PROCEDURE — 99215 OFFICE O/P EST HI 40 MIN: CPT | Performed by: INTERNAL MEDICINE

## 2020-01-14 PROCEDURE — G0463 HOSPITAL OUTPT CLINIC VISIT: HCPCS | Performed by: INTERNAL MEDICINE

## 2020-01-28 ENCOUNTER — OFFICE VISIT (OUTPATIENT)
Dept: ORTHOPEDICS CLINIC | Facility: CLINIC | Age: 75
End: 2020-01-28
Payer: MEDICARE

## 2020-01-28 VITALS
BODY MASS INDEX: 25.06 KG/M2 | HEART RATE: 69 BPM | DIASTOLIC BLOOD PRESSURE: 78 MMHG | HEIGHT: 72 IN | WEIGHT: 185 LBS | RESPIRATION RATE: 12 BRPM | SYSTOLIC BLOOD PRESSURE: 150 MMHG

## 2020-01-28 DIAGNOSIS — M17.11 PRIMARY OSTEOARTHRITIS OF RIGHT KNEE: ICD-10-CM

## 2020-01-28 DIAGNOSIS — M16.11 PRIMARY OSTEOARTHRITIS OF RIGHT HIP: ICD-10-CM

## 2020-01-28 DIAGNOSIS — M17.12 PRIMARY OSTEOARTHRITIS OF LEFT KNEE: Primary | ICD-10-CM

## 2020-01-28 DIAGNOSIS — M16.12 PRIMARY OSTEOARTHRITIS OF LEFT HIP: ICD-10-CM

## 2020-01-28 PROCEDURE — 99205 OFFICE O/P NEW HI 60 MIN: CPT | Performed by: ORTHOPAEDIC SURGERY

## 2020-01-28 PROCEDURE — 20610 DRAIN/INJ JOINT/BURSA W/O US: CPT | Performed by: ORTHOPAEDIC SURGERY

## 2020-01-28 RX ORDER — TRIAMCINOLONE ACETONIDE 40 MG/ML
40 INJECTION, SUSPENSION INTRA-ARTICULAR; INTRAMUSCULAR ONCE
Status: COMPLETED | OUTPATIENT
Start: 2020-01-28 | End: 2020-01-28

## 2020-01-28 RX ADMIN — TRIAMCINOLONE ACETONIDE 40 MG: 40 INJECTION, SUSPENSION INTRA-ARTICULAR; INTRAMUSCULAR at 14:55:00

## 2020-01-28 NOTE — H&P
NURSING INTAKE COMMENTS: Patient presents with:  Consult: bilateral knee pain -- Xrays taken on 04/22/19. Denies any injury. Wanting to discuss different Tx plans for knee pain. Rates pain 5/10.        HPI: This 69year old male presents today with complain 0   • Multiple Vitamin (MULTI-VITAMINS) Oral Tab Take 1 tablet by mouth daily. • Omega-3 Fatty Acids (CVS FISH OIL) 1200 MG Oral Cap Take 1 capsule by mouth daily.          No Known Allergies  Family History   Problem Relation Age of Onset   • Cancer groin pain. He is able to actively flex bilateral hips with 5/5 strength. Bilateral knee: Motion 5 degrees to 115 degrees bilaterally. Grossly ligamentously intact bilaterally. Calves are soft nontender no signs of DVT bilaterally.   Neurological: Nor proceed with cortisone injections. Risks, benefits and alternatives were discussed. Bilateral knee aspiration was negative and subsequent 1 cc Kenalog 5 cc bupivacaine was directed to each knee.   Both injections were given uneventfully, patient tolerated

## 2020-01-28 NOTE — PROGRESS NOTES
Per verbal order from Dr. Judith Rubalcava, draw up two syringes each with 5ml of 0.5% Marcaine and 1ml of Kenalog 40 for cortisone injection to bilateral knees. Kassidy Mitchell RN  Patient provided education handout for cortisone injection.

## 2020-02-05 RX ORDER — ATORVASTATIN CALCIUM 40 MG/1
TABLET, FILM COATED ORAL
Qty: 135 TABLET | Refills: 1 | Status: SHIPPED | OUTPATIENT
Start: 2020-02-05 | End: 2020-07-21

## 2020-02-13 ENCOUNTER — HOSPITAL ENCOUNTER (OUTPATIENT)
Dept: ULTRASOUND IMAGING | Facility: HOSPITAL | Age: 75
Discharge: HOME | End: 2020-02-13
Attending: PHYSICIAN ASSISTANT
Payer: MEDICARE

## 2020-02-13 DIAGNOSIS — M16.11 PRIMARY OSTEOARTHRITIS OF RIGHT HIP: ICD-10-CM

## 2020-02-13 DIAGNOSIS — M16.12 PRIMARY OSTEOARTHRITIS OF LEFT HIP: ICD-10-CM

## 2020-02-13 PROCEDURE — 20611 DRAIN/INJ JOINT/BURSA W/US: CPT | Performed by: PHYSICIAN ASSISTANT

## 2020-02-13 RX ORDER — TRIAMCINOLONE ACETONIDE 40 MG/ML
INJECTION, SUSPENSION INTRA-ARTICULAR; INTRAMUSCULAR
Status: DISPENSED
Start: 2020-02-13 | End: 2020-02-13

## 2020-02-13 RX ORDER — LIDOCAINE HYDROCHLORIDE 10 MG/ML
INJECTION, SOLUTION EPIDURAL; INFILTRATION; INTRACAUDAL; PERINEURAL
Status: DISPENSED
Start: 2020-02-13 | End: 2020-02-13

## 2020-02-13 RX ORDER — TRIAMCINOLONE ACETONIDE 40 MG/ML
40 INJECTION, SUSPENSION INTRA-ARTICULAR; INTRAMUSCULAR ONCE
Status: DISCONTINUED | OUTPATIENT
Start: 2020-02-13 | End: 2020-02-13

## 2020-03-20 ENCOUNTER — TELEPHONE (OUTPATIENT)
Dept: NEPHROLOGY | Facility: CLINIC | Age: 75
End: 2020-03-20

## 2020-03-20 NOTE — TELEPHONE ENCOUNTER
Pt states pharmacy is sending in new request splitting rx in to 2 rxs because the one requested is on backorder -     Pt also asking if he should wait to go in for cholesterol test due in April

## 2020-03-20 NOTE — TELEPHONE ENCOUNTER
They do not mention the medication but they mean valsartan HCT okay to do.   Just repeat lipid panel when he feels comfortable

## 2020-04-03 RX ORDER — VALSARTAN AND HYDROCHLOROTHIAZIDE 160; 12.5 MG/1; MG/1
1 TABLET, FILM COATED ORAL
Qty: 90 TABLET | Refills: 1 | Status: SHIPPED | OUTPATIENT
Start: 2020-04-03 | End: 2020-07-14 | Stop reason: RX

## 2020-04-03 NOTE — TELEPHONE ENCOUNTER
Current Outpatient Medications   Medication Sig Dispense Refill   • VALSARTAN-HYDROCHLOROTHIAZIDE 160-12.5 MG Oral Tab TAKE 1 TABLET BY MOUTH ONCE DAILY 90 tablet 0

## 2020-06-11 ENCOUNTER — APPOINTMENT (OUTPATIENT)
Dept: LAB | Facility: HOSPITAL | Age: 75
End: 2020-06-11
Attending: INTERNAL MEDICINE
Payer: MEDICARE

## 2020-06-11 DIAGNOSIS — E78.00 PURE HYPERCHOLESTEROLEMIA: ICD-10-CM

## 2020-06-11 PROCEDURE — 36415 COLL VENOUS BLD VENIPUNCTURE: CPT

## 2020-06-11 PROCEDURE — 80061 LIPID PANEL: CPT

## 2020-06-14 ENCOUNTER — TELEPHONE (OUTPATIENT)
Dept: NEPHROLOGY | Facility: CLINIC | Age: 75
End: 2020-06-14

## 2020-06-14 NOTE — TELEPHONE ENCOUNTER
Lipid panel was better. LDL cholesterol is now less than 100 at 90.   Total cholesterol still elevated secondary to your high HDL.  CPM.

## 2020-06-15 NOTE — TELEPHONE ENCOUNTER
Spoke to patient and relayed Dr. Laurel Nettles message as shown below. Patient verbalized understanding of whole message and had no further questions at this time.

## 2020-07-14 ENCOUNTER — TELEPHONE (OUTPATIENT)
Dept: NEPHROLOGY | Facility: CLINIC | Age: 75
End: 2020-07-14

## 2020-07-14 RX ORDER — VALSARTAN 160 MG/1
160 TABLET ORAL DAILY
Qty: 90 TABLET | Refills: 0 | Status: SHIPPED | OUTPATIENT
Start: 2020-07-14 | End: 2020-07-20 | Stop reason: ALTCHOICE

## 2020-07-14 RX ORDER — HYDROCHLOROTHIAZIDE 12.5 MG/1
12.5 TABLET ORAL DAILY
Qty: 90 TABLET | Refills: 0 | Status: SHIPPED | OUTPATIENT
Start: 2020-07-14 | End: 2020-09-17

## 2020-07-14 NOTE — TELEPHONE ENCOUNTER
Patient states pharm has informed him that Valsartan HCTZ is on back order - requesting for an alt. Please call. Thank you.

## 2020-07-17 NOTE — TELEPHONE ENCOUNTER
Patient stated he received the message today. He states he is still waiting from [facility].  Please advise

## 2020-07-17 NOTE — TELEPHONE ENCOUNTER
Can switch to losartan 100 mg, 1 daily. Monitor blood pressures let me know if running too high or too low.

## 2020-07-17 NOTE — TELEPHONE ENCOUNTER
Spoke to Pharmacist at [facility]. All Valsartan is on back order except today they received Valsartan-HCTZ 160-25 (patient was taking 160-12.5) otherwise do you want to replace Valsartan with Losartan or Irbesartan?

## 2020-07-20 RX ORDER — LOSARTAN POTASSIUM 100 MG/1
100 TABLET ORAL DAILY
Qty: 90 TABLET | Refills: 0 | Status: SHIPPED | OUTPATIENT
Start: 2020-07-20 | End: 2020-07-23

## 2020-07-20 NOTE — TELEPHONE ENCOUNTER
Losartan Rx sent to pharmacy.   Spoke with patient and he understands to monitor [facility]





- Chief Complaint


Chief Complaint: left hip pain





- Past Medical History


Allergies/Adverse Reactions: 


 Allergies











Allergy/AdvReac Type Severity Reaction Status Date / Time


 


Penicillins Allergy  Rash Verified 08/17/16 07:18











Cardiovascular: Yes: CAD (cardiac stent in 2006 and ? 2013), HTN, Hyperlipdemia





- Current Medications


Current Medications: 


 Home Medications











 Medication  Instructions  Recorded


 


Atenolol [Tenormin -] 25 mg PO DAILY 12/22/14


 


Valsartan [Diovan] 320 mg PO DAILY 12/22/14


 


Docusate Sodium [Colace -] 100 mg PO DAILY #30 capsule 05/28/16


 


Pantoprazole Sodium [Protonix] 40 mg PO DAILY #30 tablet. 05/28/16


 


Aspirin [ASA -] 81 mg PO DAILY 07/06/16














Satellite Physical Exam





- Physical Examination


General Appearance: Well Nourished, Well Developed, Alert & Oriented x3


ENT: Clear


Lung: Normal air movement


Heart: Regular rate & rhythm


Extremities: Other (left hip- + ttp, decr rom, nvi xrays show severe left hip 

djd)


Neurological: Intact, Alert, Oriented





Satellite Impression/Plan





- Impression/Plan


Impression: left hip djd


Operative Procedure: left cesar thr


Date to be Performed: 04/04/17

## 2020-07-21 RX ORDER — ATORVASTATIN CALCIUM 40 MG/1
TABLET, FILM COATED ORAL
Qty: 135 TABLET | Refills: 0 | Status: SHIPPED | OUTPATIENT
Start: 2020-07-21 | End: 2020-11-25

## 2020-07-21 NOTE — TELEPHONE ENCOUNTER
Last seen 1/14/2020. Return to clinic in 6 mos (7/2020) No follow up scheduled. Last lipid panel was done on 6/11/2020. Refill(s) pended and routed to Dr. Oliva Gonsalves.

## 2020-07-23 ENCOUNTER — TELEPHONE (OUTPATIENT)
Dept: NEPHROLOGY | Facility: CLINIC | Age: 75
End: 2020-07-23

## 2020-07-23 RX ORDER — LOSARTAN POTASSIUM 100 MG/1
100 TABLET ORAL DAILY
Qty: 90 TABLET | Refills: 0 | Status: SHIPPED | OUTPATIENT
Start: 2020-07-23 | End: 2020-12-22

## 2020-08-17 ENCOUNTER — TELEPHONE (OUTPATIENT)
Dept: NEPHROLOGY | Facility: CLINIC | Age: 75
End: 2020-08-17

## 2020-08-17 NOTE — TELEPHONE ENCOUNTER
Pt inquiring if he can have a flu shot at [address] scheduled for 8/21/20 at 2:20pm. Please call [phone]

## 2020-08-17 NOTE — TELEPHONE ENCOUNTER
Follow up note.    77M h/o DM2, GERD, CABG (2016) and interstitial lung disease (UIP) on 3L home O2, p/w increasing REAGAN thought to be ILD flare. Pt developed fevers and hypotension likely related to sepsis. Pt was upgraded to the MICU for acute on chronic hypoxemic respiratory failure and septic shock,  intubated 11/7 and started on vasopressors.    Source: Patient [ ]    Family [ ]     other [x ] chart    Diet : 11/7 Glucerna 1.2 goal 60cc/hr x 18 hrs via OGT    GI: no N/V/abd distention, fecal incontinence noted today     Enteral /Parenteral Nutrition: feeds not yet initiated      Current Weight: 11/18 176lb, 11/3 170lb, dosing wt 11/2 178lb  has 2+ generalized edema    Pertinent Medications: MEDICATIONS  (STANDING):  ALBUTerol/ipratropium for Nebulization 3 milliLiter(s) Nebulizer every 6 hours  aspirin  chewable 81 milliGRAM(s) Oral daily  azithromycin  IVPB 500 milliGRAM(s) IV Intermittent every 24 hours  DOBUTamine Infusion 2.5 MICROgram(s)/kG/Min (6.045 mL/Hr) IV Continuous <Continuous>  fentaNYL   Infusion 1 MICROgram(s)/kG/Hr (4.03 mL/Hr) IV Continuous <Continuous>  fluocinonide 0.05% Cream 1 Application(s) Topical every 12 hours  heparin  Injectable 5000 Unit(s) SubCutaneous every 12 hours  hydrocortisone sodium succinate Injectable 100 milliGRAM(s) IV Push every 8 hours  influenza   Vaccine 0.5 milliLiter(s) IntraMuscular once  insulin lispro (HumaLOG) corrective regimen sliding scale   SubCutaneous every 6 hours  mupirocin 2% Ointment 1 Application(s) Topical every 12 hours  norepinephrine Infusion 0.01 MICROgram(s)/kG/Min (1.511 mL/Hr) IV Continuous <Continuous>  pantoprazole  Injectable 40 milliGRAM(s) IV Push daily  phenylephrine    Infusion 0.4 MICROgram(s)/kG/Min (12.09 mL/Hr) IV Continuous <Continuous>  piperacillin/tazobactam IVPB. 3.375 Gram(s) IV Intermittent every 8 hours  propofol Infusion 5 MICROgram(s)/kG/Min (2.418 mL/Hr) IV Continuous <Continuous>  senna Syrup 10 milliLiter(s) Oral at bedtime  vancomycin  IVPB 1000 milliGRAM(s) IV Intermittent every 12 hours    MEDICATIONS  (PRN):    Pertinent Labs:  11-08 Na139 mmol/L Glu 107 mg/dL<H> K+ 5.2 mmol/L Cr  1.39 mg/dL<H> BUN 22 mg/dL Phos 6.0 mg/dL<H>     BG 96, 104, 203, 41    Skin: stage 2 left ear, stage 1 right ear    Estimated Needs:   [x ] no change since previous assessment  [ ] recalculated:       Previous Nutrition Diagnosis:  [x ] Food & Nutrition Related Knowledge Deficit     Nutrition Diagnosis is [ ] ongoing  [ ] resolved [x ] not applicable at this time, pt intubated    New Nutrition Diagnosis: [x ] not applicable        Recommend    [ ] Change Diet To:    [ ] Nutrition Supplement    [X ] Nutrition Support: Recommend Glucerna 1.2 at 80cc/hr x 18 hrs provides 1728 kcals, 86 gm protein, 1159cc free water for 23 kcals/kg, 1.2gm protein/kg usual wt of 75kg    [ ] Other:        Monitoring and Evaluation:     [ ] PO intake [ ] Tolerance to diet prescription [x ] weights [x ] follow up per protocol    [ x] other: tube feeding tolerance, labs Left detailed message on voice mail---We don't get Flu vaccine in the office until mid October.

## 2020-08-21 ENCOUNTER — OFFICE VISIT (OUTPATIENT)
Dept: NEPHROLOGY | Facility: CLINIC | Age: 75
End: 2020-08-21
Payer: MEDICARE

## 2020-08-21 VITALS
WEIGHT: 189.19 LBS | SYSTOLIC BLOOD PRESSURE: 137 MMHG | DIASTOLIC BLOOD PRESSURE: 74 MMHG | HEART RATE: 85 BPM | HEIGHT: 73 IN | TEMPERATURE: 98 F | BODY MASS INDEX: 25.08 KG/M2

## 2020-08-21 DIAGNOSIS — M19.90 OSTEOARTHRITIS, UNSPECIFIED OSTEOARTHRITIS TYPE, UNSPECIFIED SITE: ICD-10-CM

## 2020-08-21 DIAGNOSIS — I10 ESSENTIAL HYPERTENSION: Primary | ICD-10-CM

## 2020-08-21 DIAGNOSIS — E78.00 PURE HYPERCHOLESTEROLEMIA: ICD-10-CM

## 2020-08-21 PROCEDURE — 99214 OFFICE O/P EST MOD 30 MIN: CPT | Performed by: INTERNAL MEDICINE

## 2020-08-21 PROCEDURE — G0463 HOSPITAL OUTPT CLINIC VISIT: HCPCS | Performed by: INTERNAL MEDICINE

## 2020-08-21 RX ORDER — ASCORBIC ACID 500 MG
500 TABLET ORAL DAILY
COMMUNITY
End: 2021-12-08 | Stop reason: ALTCHOICE

## 2020-08-21 NOTE — PROGRESS NOTES
[facility], [facility]  Nephrology Daily Progress Note    Colleen Ramirez  [id number]  70year old  Patient presents with: Follow - Up: 6 month      HPI:   Colleen Ramirez is a 70year old male.   65-year-old male with a history of hypertension, hypercholestero - -   Weight - - 189 lbs 3 oz   Height - - 73   BODY MASS INDEX - 24.96 -       VITALS: WEIGHT ONLY 1/14/2020 1/28/2020 8/21/2020   Weight 191 lbs 13 oz 185 lbs 189 lbs 3 oz       Constitutional: appears well hydrated alert and responsive no acute distress hypertension  (primary encounter diagnosis)  Pure hypercholesterolemia  Osteoarthritis, unspecified osteoarthritis type, unspecified site    Patient Active Problem List:     Senile cataract     Myopia, bilateral     Actinic keratosis     History of nonmela

## 2020-08-31 ENCOUNTER — OFFICE VISIT (OUTPATIENT)
Dept: OPTOMETRY | Facility: CLINIC | Age: 75
End: 2020-08-31
Payer: MEDICARE

## 2020-08-31 DIAGNOSIS — H52.13 MYOPIA, BILATERAL: ICD-10-CM

## 2020-08-31 DIAGNOSIS — H25.13 AGE-RELATED NUCLEAR CATARACT OF BOTH EYES: Primary | ICD-10-CM

## 2020-08-31 PROCEDURE — 92015 DETERMINE REFRACTIVE STATE: CPT | Performed by: OPTOMETRIST

## 2020-08-31 PROCEDURE — 92014 COMPRE OPH EXAM EST PT 1/>: CPT | Performed by: OPTOMETRIST

## 2020-08-31 NOTE — PROGRESS NOTES
Shania Butts is a 70year old male. HPI:     HPI     Patient is in for an annual eye exam. He had a floater in his left eye 5 months ago the size of an amoeba . He noted it for a few days and has since disappeared.  Never had any flashes associated w daily.       • Omega-3 Fatty Acids ([facility] FISH OIL) 1200 MG Oral Cap Take 1 capsule by mouth daily.            Allergies:  No Known Allergies    ROS:     ROS     Negative for: Constitutional, Gastrointestinal, Neurological, Skin, Genitourinary, Musculoskeleta Right -3.50 -1.00 090 20/20 +2.50    Left -2.25 -1.25 090 20/20 +2.50    Type:  Progressive bifocal                 ASSESSMENT/PLAN:     Diagnoses and Plan:     Myopia, bilateral  New glasses RX given to update as needed.       Age-related nuclear catara

## 2020-09-08 ENCOUNTER — OFFICE VISIT (OUTPATIENT)
Dept: ORTHOPEDICS CLINIC | Facility: CLINIC | Age: 75
End: 2020-09-08
Payer: MEDICARE

## 2020-09-08 VITALS
BODY MASS INDEX: 24.65 KG/M2 | SYSTOLIC BLOOD PRESSURE: 150 MMHG | HEART RATE: 62 BPM | HEIGHT: 73 IN | WEIGHT: 186 LBS | DIASTOLIC BLOOD PRESSURE: 76 MMHG

## 2020-09-08 DIAGNOSIS — M17.11 PRIMARY OSTEOARTHRITIS OF RIGHT KNEE: ICD-10-CM

## 2020-09-08 DIAGNOSIS — M16.12 PRIMARY OSTEOARTHRITIS OF LEFT HIP: ICD-10-CM

## 2020-09-08 DIAGNOSIS — M16.11 PRIMARY OSTEOARTHRITIS OF RIGHT HIP: ICD-10-CM

## 2020-09-08 DIAGNOSIS — M17.12 PRIMARY OSTEOARTHRITIS OF LEFT KNEE: Primary | ICD-10-CM

## 2020-09-08 PROCEDURE — G0463 HOSPITAL OUTPT CLINIC VISIT: HCPCS | Performed by: ORTHOPAEDIC SURGERY

## 2020-09-08 PROCEDURE — 20610 DRAIN/INJ JOINT/BURSA W/O US: CPT | Performed by: ORTHOPAEDIC SURGERY

## 2020-09-08 PROCEDURE — 99213 OFFICE O/P EST LOW 20 MIN: CPT | Performed by: ORTHOPAEDIC SURGERY

## 2020-09-08 RX ORDER — TRIAMCINOLONE ACETONIDE 40 MG/ML
40 INJECTION, SUSPENSION INTRA-ARTICULAR; INTRAMUSCULAR ONCE
Status: COMPLETED | OUTPATIENT
Start: 2020-09-08 | End: 2020-09-08

## 2020-09-08 RX ADMIN — TRIAMCINOLONE ACETONIDE 40 MG: 40 INJECTION, SUSPENSION INTRA-ARTICULAR; INTRAMUSCULAR at 18:04:00

## 2020-09-08 RX ADMIN — TRIAMCINOLONE ACETONIDE 40 MG: 40 INJECTION, SUSPENSION INTRA-ARTICULAR; INTRAMUSCULAR at 18:05:00

## 2020-09-08 NOTE — PROGRESS NOTES
NURSING INTAKE COMMENTS: Patient presents with: Follow - Up: bilateral knee pain ,here for steroid injections,pain is a 5/10 today       HPI: This 70year old male presents today with knee and bilateral hip osteoarthritis.   I gave him cortisone injections Problem Relation Age of Onset   • Cancer Mother         Cancer-breast   • Diabetes Neg    • Glaucoma Neg      No family Hx of DVT/PE    Social History    Occupational History      Not on file    Tobacco Use      Smoking status: Former Smoker        Packs was no instability and there was no effusion. Neurological: Normal motor and sensory function of both lower extremities. Imaging: No new x-rays taken today. No results found. Lab Results   Component Value Date    WBC 4.3 12/19/2019    HGB 14. 2

## 2020-09-17 ENCOUNTER — TELEPHONE (OUTPATIENT)
Dept: NEPHROLOGY | Facility: CLINIC | Age: 75
End: 2020-09-17

## 2020-09-17 RX ORDER — HYDROCHLOROTHIAZIDE 12.5 MG/1
12.5 TABLET ORAL DAILY
Qty: 90 TABLET | Refills: 1 | Status: SHIPPED | OUTPATIENT
Start: 2020-09-17 | End: 2020-09-18

## 2020-09-17 NOTE — TELEPHONE ENCOUNTER
Current Outpatient Medications   • hydrochlorothiazide 12.5 MG Oral Tab Take 1 tablet (12.5 mg total) by mouth daily.  90 tablet 0

## 2020-09-17 NOTE — TELEPHONE ENCOUNTER
Irina Huston from [place] states the script for hydrochlorothiazide 12.5 MG Oral Tab, has to be 90 day supply with 3 refills for the insurance. So if  He can dispense the prescription with 2 additional refills.  Please advise [phone]

## 2020-09-18 RX ORDER — HYDROCHLOROTHIAZIDE 12.5 MG/1
12.5 TABLET ORAL DAILY
Qty: 90 TABLET | Refills: 0 | Status: SHIPPED | OUTPATIENT
Start: 2020-09-18 | End: 2021-03-10

## 2020-09-23 ENCOUNTER — TELEPHONE (OUTPATIENT)
Dept: ORTHOPEDICS CLINIC | Facility: CLINIC | Age: 75
End: 2020-09-23

## 2020-09-23 DIAGNOSIS — M16.11 PRIMARY OSTEOARTHRITIS OF RIGHT HIP: Primary | ICD-10-CM

## 2020-09-23 DIAGNOSIS — M16.12 PRIMARY OSTEOARTHRITIS OF LEFT HIP: ICD-10-CM

## 2020-10-01 ENCOUNTER — HOSPITAL ENCOUNTER (OUTPATIENT)
Dept: ULTRASOUND IMAGING | Facility: HOSPITAL | Age: 75
Discharge: HOME | End: 2020-10-01
Attending: ORTHOPAEDIC SURGERY
Payer: MEDICARE

## 2020-10-01 DIAGNOSIS — M16.11 PRIMARY OSTEOARTHRITIS OF RIGHT HIP: ICD-10-CM

## 2020-10-01 DIAGNOSIS — M16.12 PRIMARY OSTEOARTHRITIS OF LEFT HIP: ICD-10-CM

## 2020-10-01 PROCEDURE — 20611 DRAIN/INJ JOINT/BURSA W/US: CPT | Performed by: ORTHOPAEDIC SURGERY

## 2020-10-01 RX ORDER — LIDOCAINE HYDROCHLORIDE 10 MG/ML
INJECTION, SOLUTION EPIDURAL; INFILTRATION; INTRACAUDAL; PERINEURAL
Status: DISPENSED
Start: 2020-10-01 | End: 2020-10-01

## 2020-10-01 RX ORDER — TRIAMCINOLONE ACETONIDE 40 MG/ML
INJECTION, SUSPENSION INTRA-ARTICULAR; INTRAMUSCULAR
Status: DISPENSED
Start: 2020-10-01 | End: 2020-10-01

## 2020-10-27 ENCOUNTER — TELEPHONE (OUTPATIENT)
Dept: NEPHROLOGY | Facility: CLINIC | Age: 75
End: 2020-10-27

## 2020-10-28 NOTE — TELEPHONE ENCOUNTER
No pneumonia, no tetanus in immunization record and patient has original shingles vaccine in 2012 but not the new one. Please advise and order if ok.

## 2020-10-28 NOTE — TELEPHONE ENCOUNTER
Spoke to [employer], patient's wife. She statyes no vaccines have been given anywhere else. Advised to make a nurse visit appointment. She will check scheduled and call back. PSRs please book if patient calls back.  Best to scheduled on Tuesday, Wednesday or Thu

## 2020-10-28 NOTE — TELEPHONE ENCOUNTER
Unless he got these vaccines elsewhere okay for Prevnar 13 now and Pneumovax in 1 year. Okay for Shingrix. Okay for Tdap if more than 10 years.

## 2020-11-03 ENCOUNTER — NURSE ONLY (OUTPATIENT)
Dept: NEPHROLOGY | Facility: CLINIC | Age: 75
End: 2020-11-03
Payer: MEDICARE

## 2020-11-03 DIAGNOSIS — Z23 NEED FOR PROPHYLACTIC VACCINATION AGAINST STREPTOCOCCUS PNEUMONIAE (PNEUMOCOCCUS): Primary | ICD-10-CM

## 2020-11-03 DIAGNOSIS — Z23 NEED FOR PROPHYLACTIC VACCINATION WITH COMBINED DIPHTHERIA-TETANUS-PERTUSSIS (DTP) VACCINE: ICD-10-CM

## 2020-11-03 DIAGNOSIS — Z23 NEED FOR PROPHYLACTIC VACCINATION AND INOCULATION AGAINST VARICELLA: ICD-10-CM

## 2020-11-03 PROCEDURE — G0009 ADMIN PNEUMOCOCCAL VACCINE: HCPCS | Performed by: INTERNAL MEDICINE

## 2020-11-03 PROCEDURE — 90670 PCV13 VACCINE IM: CPT | Performed by: INTERNAL MEDICINE

## 2020-11-03 PROCEDURE — 90471 IMMUNIZATION ADMIN: CPT | Performed by: INTERNAL MEDICINE

## 2020-11-03 PROCEDURE — 90472 IMMUNIZATION ADMIN EACH ADD: CPT | Performed by: INTERNAL MEDICINE

## 2020-11-03 PROCEDURE — 90715 TDAP VACCINE 7 YRS/> IM: CPT | Performed by: INTERNAL MEDICINE

## 2020-11-03 PROCEDURE — 90750 HZV VACC RECOMBINANT IM: CPT | Performed by: INTERNAL MEDICINE

## 2020-11-03 NOTE — PROGRESS NOTES
Patient was identified by full name and date of birth. Vaccine consent signed.  Shingrix 0.5ml given IM in left deltoid, Prevnar 13 0.5 ml given IM in right deltoid, and Boostrix (T-dap) given IM in right upper outer quadrant of gluteal muscle per written o

## 2020-11-25 ENCOUNTER — TELEPHONE (OUTPATIENT)
Dept: NEPHROLOGY | Facility: CLINIC | Age: 75
End: 2020-11-25

## 2020-11-25 RX ORDER — ATORVASTATIN CALCIUM 40 MG/1
60 TABLET, FILM COATED ORAL NIGHTLY
Qty: 135 TABLET | Refills: 1 | Status: SHIPPED | OUTPATIENT
Start: 2020-11-25 | End: 2021-02-11

## 2020-11-25 NOTE — TELEPHONE ENCOUNTER
Pharmacy called for new RX for 90 day supply.         Current Outpatient Medications:     •  ATORVASTATIN 40 MG Oral Tab, TAKE ONE AND ONE-HALF TABLETS BY MOUTH NIGHTLY, Disp: 135 tablet, Rfl: 0

## 2020-11-25 NOTE — TELEPHONE ENCOUNTER
LOV 8/21/20, Fu scheduled 2/3/21  Last lipid panel: 6/11/20  Rx refilled per protocol in Dr. Los Champagne absence.

## 2020-12-22 ENCOUNTER — TELEPHONE (OUTPATIENT)
Dept: NEPHROLOGY | Facility: CLINIC | Age: 75
End: 2020-12-22

## 2020-12-22 RX ORDER — LOSARTAN POTASSIUM 100 MG/1
100 TABLET ORAL DAILY
Qty: 90 TABLET | Refills: 0 | Status: SHIPPED | OUTPATIENT
Start: 2020-12-22 | End: 2021-03-08

## 2020-12-22 NOTE — TELEPHONE ENCOUNTER
Patient requesting 90 days refills for Losartan. Please call - aware Dr Nhi Townsend is not in office. Thank you.

## 2021-01-19 ENCOUNTER — TELEPHONE (OUTPATIENT)
Dept: NEPHROLOGY | Facility: CLINIC | Age: 76
End: 2021-01-19

## 2021-01-19 DIAGNOSIS — Z01.818 PRE-OP TESTING: ICD-10-CM

## 2021-01-19 DIAGNOSIS — M35.3 PMR (POLYMYALGIA RHEUMATICA) (HCC): ICD-10-CM

## 2021-01-19 DIAGNOSIS — Z12.5 SPECIAL SCREENING FOR MALIGNANT NEOPLASM OF PROSTATE: ICD-10-CM

## 2021-01-19 DIAGNOSIS — I10 ESSENTIAL HYPERTENSION: Primary | ICD-10-CM

## 2021-01-19 DIAGNOSIS — E78.00 PURE HYPERCHOLESTEROLEMIA: ICD-10-CM

## 2021-01-19 NOTE — TELEPHONE ENCOUNTER
Pt stopped at desk- dropped off form for pre-op testing needed asap-   Pt has hip replacement sched 2/4/21-     Put in RN box

## 2021-01-19 NOTE — TELEPHONE ENCOUNTER
Orders entered in system. They need to be done no later than 2 weeks before surgery scheduled for 2/4/21. Patient contacted. Informed of orders and appointment for surgical clearance scheduled for 1/26/21 with Dr. Joi Rankin.

## 2021-01-20 ENCOUNTER — HOSPITAL ENCOUNTER (OUTPATIENT)
Dept: GENERAL RADIOLOGY | Facility: HOSPITAL | Age: 76
Discharge: HOME | End: 2021-01-20
Attending: INTERNAL MEDICINE
Payer: MEDICARE

## 2021-01-20 ENCOUNTER — LAB ENCOUNTER (OUTPATIENT)
Dept: LAB | Facility: HOSPITAL | Age: 76
End: 2021-01-20
Attending: INTERNAL MEDICINE
Payer: MEDICARE

## 2021-01-20 DIAGNOSIS — Z12.5 SPECIAL SCREENING FOR MALIGNANT NEOPLASM OF PROSTATE: ICD-10-CM

## 2021-01-20 DIAGNOSIS — Z01.818 PRE-OP TESTING: ICD-10-CM

## 2021-01-20 DIAGNOSIS — E78.00 PURE HYPERCHOLESTEROLEMIA: ICD-10-CM

## 2021-01-20 DIAGNOSIS — M35.3 PMR (POLYMYALGIA RHEUMATICA) (HCC): ICD-10-CM

## 2021-01-20 DIAGNOSIS — I10 ESSENTIAL HYPERTENSION: ICD-10-CM

## 2021-01-20 LAB
ALBUMIN SERPL-MCNC: 3.9 G/DL (ref 3.4–5)
ALBUMIN/GLOB SERPL: 1.1 {RATIO} (ref 1–2)
ALP LIVER SERPL-CCNC: 67 U/L
ALT SERPL-CCNC: 30 U/L
ANION GAP SERPL CALC-SCNC: 6 MMOL/L (ref 0–18)
ANTIBODY SCREEN: NEGATIVE
AST SERPL-CCNC: 20 U/L (ref 15–37)
BASOPHILS # BLD AUTO: 0.02 X10(3) UL (ref 0–0.2)
BASOPHILS NFR BLD AUTO: 0.5 %
BILIRUB SERPL-MCNC: 0.8 MG/DL (ref 0.1–2)
BUN BLD-MCNC: 21 MG/DL (ref 7–18)
BUN/CREAT SERPL: 21.6 (ref 10–20)
CALCIUM BLD-MCNC: 9.7 MG/DL (ref 8.5–10.1)
CHLORIDE SERPL-SCNC: 105 MMOL/L (ref 98–112)
CHOLEST SMN-MCNC: 221 MG/DL (ref ?–200)
CO2 SERPL-SCNC: 30 MMOL/L (ref 21–32)
COMPLEXED PSA SERPL-MCNC: 1.65 NG/ML (ref ?–4)
CREAT BLD-MCNC: 0.97 MG/DL
DEPRECATED RDW RBC AUTO: 44.3 FL (ref 35.1–46.3)
EOSINOPHIL # BLD AUTO: 0.08 X10(3) UL (ref 0–0.7)
EOSINOPHIL NFR BLD AUTO: 1.9 %
ERYTHROCYTE [DISTWIDTH] IN BLOOD BY AUTOMATED COUNT: 11.8 % (ref 11–15)
GLOBULIN PLAS-MCNC: 3.6 G/DL (ref 2.8–4.4)
GLUCOSE BLD-MCNC: 101 MG/DL (ref 70–99)
HCT VFR BLD AUTO: 44.2 %
HDLC SERPL-MCNC: 130 MG/DL (ref 40–59)
HGB BLD-MCNC: 14.1 G/DL
IMM GRANULOCYTES # BLD AUTO: 0.02 X10(3) UL (ref 0–1)
IMM GRANULOCYTES NFR BLD: 0.5 %
LDLC SERPL CALC-MCNC: 83 MG/DL (ref ?–100)
LYMPHOCYTES # BLD AUTO: 0.97 X10(3) UL (ref 1–4)
LYMPHOCYTES NFR BLD AUTO: 22.5 %
M PROTEIN MFR SERPL ELPH: 7.5 G/DL (ref 6.4–8.2)
MCH RBC QN AUTO: 32.6 PG (ref 26–34)
MCHC RBC AUTO-ENTMCNC: 31.9 G/DL (ref 31–37)
MCV RBC AUTO: 102.1 FL
MONOCYTES # BLD AUTO: 0.5 X10(3) UL (ref 0.1–1)
MONOCYTES NFR BLD AUTO: 11.6 %
NEUTROPHILS # BLD AUTO: 2.72 X10 (3) UL (ref 1.5–7.7)
NEUTROPHILS # BLD AUTO: 2.72 X10(3) UL (ref 1.5–7.7)
NEUTROPHILS NFR BLD AUTO: 63 %
NONHDLC SERPL-MCNC: 91 MG/DL (ref ?–130)
OSMOLALITY SERPL CALC.SUM OF ELEC: 295 MOSM/KG (ref 275–295)
PATIENT FASTING Y/N/NP: YES
PATIENT FASTING Y/N/NP: YES
PLATELET # BLD AUTO: 237 10(3)UL (ref 150–450)
POTASSIUM SERPL-SCNC: 4.1 MMOL/L (ref 3.5–5.1)
RBC # BLD AUTO: 4.33 X10(6)UL
RH BLOOD TYPE: NEGATIVE
SODIUM SERPL-SCNC: 141 MMOL/L (ref 136–145)
TRIGL SERPL-MCNC: 38 MG/DL (ref 30–149)
VLDLC SERPL CALC-MCNC: 8 MG/DL (ref 0–30)
WBC # BLD AUTO: 4.3 X10(3) UL (ref 4–11)

## 2021-01-20 PROCEDURE — 86850 RBC ANTIBODY SCREEN: CPT

## 2021-01-20 PROCEDURE — 85025 COMPLETE CBC W/AUTO DIFF WBC: CPT

## 2021-01-20 PROCEDURE — 86900 BLOOD TYPING SEROLOGIC ABO: CPT

## 2021-01-20 PROCEDURE — 93005 ELECTROCARDIOGRAM TRACING: CPT

## 2021-01-20 PROCEDURE — 71046 X-RAY EXAM CHEST 2 VIEWS: CPT | Performed by: INTERNAL MEDICINE

## 2021-01-20 PROCEDURE — 36415 COLL VENOUS BLD VENIPUNCTURE: CPT

## 2021-01-20 PROCEDURE — 80061 LIPID PANEL: CPT

## 2021-01-20 PROCEDURE — 87081 CULTURE SCREEN ONLY: CPT

## 2021-01-20 PROCEDURE — 86901 BLOOD TYPING SEROLOGIC RH(D): CPT

## 2021-01-20 PROCEDURE — 93010 ELECTROCARDIOGRAM REPORT: CPT | Performed by: INTERNAL MEDICINE

## 2021-01-20 PROCEDURE — 80053 COMPREHEN METABOLIC PANEL: CPT

## 2021-01-26 ENCOUNTER — OFFICE VISIT (OUTPATIENT)
Dept: NEPHROLOGY | Facility: CLINIC | Age: 76
End: 2021-01-26
Payer: MEDICARE

## 2021-01-26 VITALS
HEART RATE: 84 BPM | SYSTOLIC BLOOD PRESSURE: 144 MMHG | BODY MASS INDEX: 24.78 KG/M2 | HEIGHT: 73 IN | DIASTOLIC BLOOD PRESSURE: 81 MMHG | WEIGHT: 187 LBS

## 2021-01-26 DIAGNOSIS — Z01.818 PREOP EXAM FOR INTERNAL MEDICINE: Primary | ICD-10-CM

## 2021-01-26 DIAGNOSIS — M81.0 OSTEOPOROSIS, UNSPECIFIED OSTEOPOROSIS TYPE, UNSPECIFIED PATHOLOGICAL FRACTURE PRESENCE: ICD-10-CM

## 2021-01-26 DIAGNOSIS — I10 ESSENTIAL HYPERTENSION: ICD-10-CM

## 2021-01-26 DIAGNOSIS — E78.00 PURE HYPERCHOLESTEROLEMIA: ICD-10-CM

## 2021-01-26 PROCEDURE — 99214 OFFICE O/P EST MOD 30 MIN: CPT | Performed by: INTERNAL MEDICINE

## 2021-01-26 NOTE — PROGRESS NOTES
[facility], [facility]  Nephrology Daily Progress Note    Colleen Ramirez  [id number]  70year old  Patient presents with:  Pre-Op Exam: Patient is here for presurgical visit. Pt states he is having left hip replacement done next week by Dr Yoli Donato.  Rates pain 5/ Size - - large   Pulse 62 - 84   Resp - - -   Temp - - -   SpO2 - - -   Weight 186 lbs - 187 lbs   Height 73 - 73   BODY MASS INDEX - 24.67 -       VITALS: WEIGHT ONLY 8/21/2020 9/8/2020 1/26/2021   Weight 189 lbs 3 oz 186 lbs 187 lbs       Constitutional: 500 mg by mouth daily. , Disp: , Rfl:   •  Multiple Vitamin (MULTI-VITAMINS) Oral Tab, Take 1 tablet by mouth daily. , Disp: , Rfl:   •  Omega-3 Fatty Acids ([employer] FISH OIL) 1200 MG Oral Cap, Take 1 capsule by mouth daily.   , Disp: , Rfl:   •  cholecalcifero

## 2021-01-27 ENCOUNTER — TELEPHONE (OUTPATIENT)
Dept: NEPHROLOGY | Facility: CLINIC | Age: 76
End: 2021-01-27

## 2021-02-01 ENCOUNTER — LAB ENCOUNTER (OUTPATIENT)
Dept: LAB | Facility: HOSPITAL | Age: 76
DRG: 470 | End: 2021-02-01
Attending: ORTHOPAEDIC SURGERY
Payer: MEDICARE

## 2021-02-01 DIAGNOSIS — Z01.818 PREOP TESTING: ICD-10-CM

## 2021-02-01 LAB — SARS-COV-2 RNA RESP QL NAA+PROBE: NOT DETECTED

## 2021-02-03 ENCOUNTER — ANESTHESIA EVENT (OUTPATIENT)
Dept: SURGERY | Facility: HOSPITAL | Age: 76
DRG: 470 | End: 2021-02-03
Payer: MEDICARE

## 2021-02-04 ENCOUNTER — APPOINTMENT (OUTPATIENT)
Dept: GENERAL RADIOLOGY | Facility: HOSPITAL | Age: 76
DRG: 470 | End: 2021-02-04
Attending: NURSE PRACTITIONER
Payer: MEDICARE

## 2021-02-04 ENCOUNTER — HOSPITAL ENCOUNTER (INPATIENT)
Facility: HOSPITAL | Age: 76
LOS: 3 days | Discharge: HOME HEALTH | DRG: 470 | End: 2021-02-07
Attending: ORTHOPAEDIC SURGERY | Admitting: ORTHOPAEDIC SURGERY
Payer: MEDICARE

## 2021-02-04 ENCOUNTER — ANESTHESIA (OUTPATIENT)
Dept: SURGERY | Facility: HOSPITAL | Age: 76
DRG: 470 | End: 2021-02-04
Payer: MEDICARE

## 2021-02-04 DIAGNOSIS — Z96.642 S/P HIP REPLACEMENT, LEFT: ICD-10-CM

## 2021-02-04 DIAGNOSIS — Z01.818 PREOP TESTING: ICD-10-CM

## 2021-02-04 DIAGNOSIS — M16.12 PRIMARY OSTEOARTHRITIS OF LEFT HIP: Primary | ICD-10-CM

## 2021-02-04 LAB
ANION GAP SERPL CALC-SCNC: 2 MMOL/L (ref 0–18)
BUN BLD-MCNC: 23 MG/DL (ref 7–18)
BUN/CREAT SERPL: 23 (ref 10–20)
CALCIUM BLD-MCNC: 9.3 MG/DL (ref 8.5–10.1)
CHLORIDE SERPL-SCNC: 107 MMOL/L (ref 98–112)
CO2 SERPL-SCNC: 29 MMOL/L (ref 21–32)
CREAT BLD-MCNC: 1 MG/DL
GLUCOSE BLD-MCNC: 113 MG/DL (ref 70–99)
HCT VFR BLD AUTO: 39.9 %
HGB BLD-MCNC: 12.8 G/DL
OSMOLALITY SERPL CALC.SUM OF ELEC: 290 MOSM/KG (ref 275–295)
POTASSIUM SERPL-SCNC: 4.4 MMOL/L (ref 3.5–5.1)
SODIUM SERPL-SCNC: 138 MMOL/L (ref 136–145)

## 2021-02-04 PROCEDURE — 72170 X-RAY EXAM OF PELVIS: CPT | Performed by: NURSE PRACTITIONER

## 2021-02-04 PROCEDURE — 99232 SBSQ HOSP IP/OBS MODERATE 35: CPT | Performed by: HOSPITALIST

## 2021-02-04 PROCEDURE — 0SRB04A REPLACEMENT OF LEFT HIP JOINT WITH CERAMIC ON POLYETHYLENE SYNTHETIC SUBSTITUTE, UNCEMENTED, OPEN APPROACH: ICD-10-PCS | Performed by: ORTHOPAEDIC SURGERY

## 2021-02-04 RX ORDER — NALOXONE HYDROCHLORIDE 0.4 MG/ML
80 INJECTION, SOLUTION INTRAMUSCULAR; INTRAVENOUS; SUBCUTANEOUS AS NEEDED
Status: DISCONTINUED | OUTPATIENT
Start: 2021-02-04 | End: 2021-02-04 | Stop reason: HOSPADM

## 2021-02-04 RX ORDER — DEXAMETHASONE SODIUM PHOSPHATE 4 MG/ML
VIAL (ML) INJECTION AS NEEDED
Status: DISCONTINUED | OUTPATIENT
Start: 2021-02-04 | End: 2021-02-04 | Stop reason: SURG

## 2021-02-04 RX ORDER — SODIUM CHLORIDE, SODIUM LACTATE, POTASSIUM CHLORIDE, CALCIUM CHLORIDE 600; 310; 30; 20 MG/100ML; MG/100ML; MG/100ML; MG/100ML
INJECTION, SOLUTION INTRAVENOUS CONTINUOUS
Status: DISCONTINUED | OUTPATIENT
Start: 2021-02-04 | End: 2021-02-07

## 2021-02-04 RX ORDER — HYDROMORPHONE HYDROCHLORIDE 1 MG/ML
0.4 INJECTION, SOLUTION INTRAMUSCULAR; INTRAVENOUS; SUBCUTANEOUS
Status: ACTIVE | OUTPATIENT
Start: 2021-02-04 | End: 2021-02-05

## 2021-02-04 RX ORDER — FAMOTIDINE 20 MG/1
20 TABLET ORAL 2 TIMES DAILY
Status: DISCONTINUED | OUTPATIENT
Start: 2021-02-04 | End: 2021-02-07

## 2021-02-04 RX ORDER — BISACODYL 10 MG
10 SUPPOSITORY, RECTAL RECTAL
Status: DISCONTINUED | OUTPATIENT
Start: 2021-02-04 | End: 2021-02-07

## 2021-02-04 RX ORDER — ACETAMINOPHEN 325 MG/1
650 TABLET ORAL EVERY 6 HOURS PRN
Status: DISCONTINUED | OUTPATIENT
Start: 2021-02-04 | End: 2021-02-07

## 2021-02-04 RX ORDER — MORPHINE SULFATE 4 MG/ML
4 INJECTION, SOLUTION INTRAMUSCULAR; INTRAVENOUS EVERY 2 HOUR PRN
Status: DISCONTINUED | OUTPATIENT
Start: 2021-02-04 | End: 2021-02-07

## 2021-02-04 RX ORDER — PROCHLORPERAZINE EDISYLATE 5 MG/ML
5 INJECTION INTRAMUSCULAR; INTRAVENOUS ONCE AS NEEDED
Status: ACTIVE | OUTPATIENT
Start: 2021-02-04 | End: 2021-02-04

## 2021-02-04 RX ORDER — ACETAMINOPHEN 650 MG
TABLET, EXTENDED RELEASE ORAL AS NEEDED
Status: DISCONTINUED | OUTPATIENT
Start: 2021-02-04 | End: 2021-02-04

## 2021-02-04 RX ORDER — HYDROCODONE BITARTRATE AND ACETAMINOPHEN 10; 325 MG/1; MG/1
2 TABLET ORAL EVERY 4 HOURS PRN
Status: DISCONTINUED | OUTPATIENT
Start: 2021-02-04 | End: 2021-02-07

## 2021-02-04 RX ORDER — POLYETHYLENE GLYCOL 3350 17 G/17G
17 POWDER, FOR SOLUTION ORAL DAILY PRN
Status: DISCONTINUED | OUTPATIENT
Start: 2021-02-04 | End: 2021-02-07

## 2021-02-04 RX ORDER — CYCLOBENZAPRINE HCL 10 MG
10 TABLET ORAL EVERY 8 HOURS PRN
Status: DISCONTINUED | OUTPATIENT
Start: 2021-02-04 | End: 2021-02-07

## 2021-02-04 RX ORDER — MORPHINE SULFATE 1 MG/ML
INJECTION, SOLUTION EPIDURAL; INTRATHECAL; INTRAVENOUS AS NEEDED
Status: DISCONTINUED | OUTPATIENT
Start: 2021-02-04 | End: 2021-02-04 | Stop reason: SURG

## 2021-02-04 RX ORDER — NALOXONE HYDROCHLORIDE 0.4 MG/ML
0.08 INJECTION, SOLUTION INTRAMUSCULAR; INTRAVENOUS; SUBCUTANEOUS
Status: ACTIVE | OUTPATIENT
Start: 2021-02-04 | End: 2021-02-05

## 2021-02-04 RX ORDER — FAMOTIDINE 10 MG/ML
20 INJECTION, SOLUTION INTRAVENOUS 2 TIMES DAILY
Status: DISCONTINUED | OUTPATIENT
Start: 2021-02-04 | End: 2021-02-07

## 2021-02-04 RX ORDER — ONDANSETRON 2 MG/ML
4 INJECTION INTRAMUSCULAR; INTRAVENOUS ONCE AS NEEDED
Status: DISCONTINUED | OUTPATIENT
Start: 2021-02-04 | End: 2021-02-04 | Stop reason: HOSPADM

## 2021-02-04 RX ORDER — SENNOSIDES 8.6 MG
17.2 TABLET ORAL NIGHTLY
Status: DISCONTINUED | OUTPATIENT
Start: 2021-02-04 | End: 2021-02-07

## 2021-02-04 RX ORDER — HYDROCODONE BITARTRATE AND ACETAMINOPHEN 5; 325 MG/1; MG/1
1 TABLET ORAL EVERY 4 HOURS PRN
Status: DISCONTINUED | OUTPATIENT
Start: 2021-02-04 | End: 2021-02-07

## 2021-02-04 RX ORDER — MIDAZOLAM HYDROCHLORIDE 1 MG/ML
INJECTION INTRAMUSCULAR; INTRAVENOUS AS NEEDED
Status: DISCONTINUED | OUTPATIENT
Start: 2021-02-04 | End: 2021-02-04 | Stop reason: SURG

## 2021-02-04 RX ORDER — MORPHINE SULFATE 2 MG/ML
1 INJECTION, SOLUTION INTRAMUSCULAR; INTRAVENOUS EVERY 2 HOUR PRN
Status: DISCONTINUED | OUTPATIENT
Start: 2021-02-04 | End: 2021-02-07

## 2021-02-04 RX ORDER — CEFAZOLIN SODIUM/WATER 2 G/20 ML
2 SYRINGE (ML) INTRAVENOUS EVERY 8 HOURS
Status: COMPLETED | OUTPATIENT
Start: 2021-02-04 | End: 2021-02-05

## 2021-02-04 RX ORDER — NALBUPHINE HCL 10 MG/ML
2.5 AMPUL (ML) INJECTION EVERY 4 HOURS PRN
Status: DISCONTINUED | OUTPATIENT
Start: 2021-02-04 | End: 2021-02-04 | Stop reason: RX

## 2021-02-04 RX ORDER — HYDROCODONE BITARTRATE AND ACETAMINOPHEN 5; 325 MG/1; MG/1
1 TABLET ORAL AS NEEDED
Status: DISCONTINUED | OUTPATIENT
Start: 2021-02-04 | End: 2021-02-04 | Stop reason: HOSPADM

## 2021-02-04 RX ORDER — MAGNESIUM HYDROXIDE 1200 MG/15ML
LIQUID ORAL CONTINUOUS PRN
Status: COMPLETED | OUTPATIENT
Start: 2021-02-04 | End: 2021-02-04

## 2021-02-04 RX ORDER — ONDANSETRON 2 MG/ML
INJECTION INTRAMUSCULAR; INTRAVENOUS AS NEEDED
Status: DISCONTINUED | OUTPATIENT
Start: 2021-02-04 | End: 2021-02-04 | Stop reason: SURG

## 2021-02-04 RX ORDER — HYDROCHLOROTHIAZIDE 25 MG/1
12.5 TABLET ORAL DAILY
Status: DISCONTINUED | OUTPATIENT
Start: 2021-02-05 | End: 2021-02-07

## 2021-02-04 RX ORDER — ACETAMINOPHEN 500 MG
1000 TABLET ORAL ONCE
Status: COMPLETED | OUTPATIENT
Start: 2021-02-04 | End: 2021-02-04

## 2021-02-04 RX ORDER — DIPHENHYDRAMINE HYDROCHLORIDE 50 MG/ML
12.5 INJECTION INTRAMUSCULAR; INTRAVENOUS EVERY 4 HOURS PRN
Status: ACTIVE | OUTPATIENT
Start: 2021-02-04 | End: 2021-02-05

## 2021-02-04 RX ORDER — BUPIVACAINE HYDROCHLORIDE 7.5 MG/ML
INJECTION, SOLUTION INTRASPINAL AS NEEDED
Status: DISCONTINUED | OUTPATIENT
Start: 2021-02-04 | End: 2021-02-04 | Stop reason: SURG

## 2021-02-04 RX ORDER — SODIUM CHLORIDE, SODIUM LACTATE, POTASSIUM CHLORIDE, CALCIUM CHLORIDE 600; 310; 30; 20 MG/100ML; MG/100ML; MG/100ML; MG/100ML
INJECTION, SOLUTION INTRAVENOUS CONTINUOUS
Status: DISCONTINUED | OUTPATIENT
Start: 2021-02-04 | End: 2021-02-04 | Stop reason: HOSPADM

## 2021-02-04 RX ORDER — ACETAMINOPHEN 325 MG/1
650 TABLET ORAL EVERY 4 HOURS PRN
Status: DISCONTINUED | OUTPATIENT
Start: 2021-02-04 | End: 2021-02-07

## 2021-02-04 RX ORDER — DOCUSATE SODIUM 100 MG/1
100 CAPSULE, LIQUID FILLED ORAL 2 TIMES DAILY
Status: DISCONTINUED | OUTPATIENT
Start: 2021-02-04 | End: 2021-02-07

## 2021-02-04 RX ORDER — SODIUM CHLORIDE 9 MG/ML
INJECTION, SOLUTION INTRAVENOUS CONTINUOUS
Status: DISCONTINUED | OUTPATIENT
Start: 2021-02-04 | End: 2021-02-07

## 2021-02-04 RX ORDER — PROCHLORPERAZINE EDISYLATE 5 MG/ML
5 INJECTION INTRAMUSCULAR; INTRAVENOUS ONCE AS NEEDED
Status: DISCONTINUED | OUTPATIENT
Start: 2021-02-04 | End: 2021-02-04 | Stop reason: HOSPADM

## 2021-02-04 RX ORDER — HYDROCODONE BITARTRATE AND ACETAMINOPHEN 10; 325 MG/1; MG/1
1 TABLET ORAL EVERY 4 HOURS PRN
Status: DISCONTINUED | OUTPATIENT
Start: 2021-02-04 | End: 2021-02-07

## 2021-02-04 RX ORDER — ZOLPIDEM TARTRATE 5 MG/1
5 TABLET ORAL NIGHTLY PRN
Status: DISCONTINUED | OUTPATIENT
Start: 2021-02-04 | End: 2021-02-07

## 2021-02-04 RX ORDER — HALOPERIDOL 5 MG/ML
0.5 INJECTION INTRAMUSCULAR ONCE AS NEEDED
Status: ACTIVE | OUTPATIENT
Start: 2021-02-04 | End: 2021-02-04

## 2021-02-04 RX ORDER — ONDANSETRON 2 MG/ML
4 INJECTION INTRAMUSCULAR; INTRAVENOUS EVERY 4 HOURS PRN
Status: DISCONTINUED | OUTPATIENT
Start: 2021-02-04 | End: 2021-02-07

## 2021-02-04 RX ORDER — KETOROLAC TROMETHAMINE 15 MG/ML
15 INJECTION, SOLUTION INTRAMUSCULAR; INTRAVENOUS EVERY 6 HOURS
Status: DISCONTINUED | OUTPATIENT
Start: 2021-02-04 | End: 2021-02-04 | Stop reason: HOSPADM

## 2021-02-04 RX ORDER — DIPHENHYDRAMINE HCL 25 MG
25 CAPSULE ORAL EVERY 4 HOURS PRN
Status: ACTIVE | OUTPATIENT
Start: 2021-02-04 | End: 2021-02-05

## 2021-02-04 RX ORDER — MORPHINE SULFATE 2 MG/ML
2 INJECTION, SOLUTION INTRAMUSCULAR; INTRAVENOUS EVERY 2 HOUR PRN
Status: DISCONTINUED | OUTPATIENT
Start: 2021-02-04 | End: 2021-02-07

## 2021-02-04 RX ORDER — CELECOXIB 200 MG/1
200 CAPSULE ORAL ONCE
Status: COMPLETED | OUTPATIENT
Start: 2021-02-04 | End: 2021-02-04

## 2021-02-04 RX ORDER — LIDOCAINE HYDROCHLORIDE 10 MG/ML
INJECTION, SOLUTION EPIDURAL; INFILTRATION; INTRACAUDAL; PERINEURAL AS NEEDED
Status: DISCONTINUED | OUTPATIENT
Start: 2021-02-04 | End: 2021-02-04 | Stop reason: SURG

## 2021-02-04 RX ORDER — CEFAZOLIN SODIUM/WATER 2 G/20 ML
2 SYRINGE (ML) INTRAVENOUS ONCE
Status: COMPLETED | OUTPATIENT
Start: 2021-02-04 | End: 2021-02-04

## 2021-02-04 RX ORDER — OXYCODONE HCL 10 MG/1
10 TABLET, FILM COATED, EXTENDED RELEASE ORAL ONCE
Status: COMPLETED | OUTPATIENT
Start: 2021-02-04 | End: 2021-02-04

## 2021-02-04 RX ORDER — LOSARTAN POTASSIUM 100 MG/1
100 TABLET ORAL DAILY
Status: DISCONTINUED | OUTPATIENT
Start: 2021-02-05 | End: 2021-02-07

## 2021-02-04 RX ORDER — SODIUM PHOSPHATE, DIBASIC AND SODIUM PHOSPHATE, MONOBASIC 7; 19 G/133ML; G/133ML
1 ENEMA RECTAL ONCE AS NEEDED
Status: DISCONTINUED | OUTPATIENT
Start: 2021-02-04 | End: 2021-02-07

## 2021-02-04 RX ORDER — HYDROCODONE BITARTRATE AND ACETAMINOPHEN 7.5; 325 MG/1; MG/1
1 TABLET ORAL EVERY 6 HOURS PRN
Status: DISCONTINUED | OUTPATIENT
Start: 2021-02-04 | End: 2021-02-07

## 2021-02-04 RX ORDER — HYDROCODONE BITARTRATE AND ACETAMINOPHEN 7.5; 325 MG/1; MG/1
2 TABLET ORAL EVERY 6 HOURS PRN
Status: DISCONTINUED | OUTPATIENT
Start: 2021-02-04 | End: 2021-02-07

## 2021-02-04 RX ORDER — PROCHLORPERAZINE EDISYLATE 5 MG/ML
10 INJECTION INTRAMUSCULAR; INTRAVENOUS EVERY 6 HOURS PRN
Status: ACTIVE | OUTPATIENT
Start: 2021-02-04 | End: 2021-02-06

## 2021-02-04 RX ORDER — EPHEDRINE SULFATE 50 MG/ML
INJECTION INTRAVENOUS AS NEEDED
Status: DISCONTINUED | OUTPATIENT
Start: 2021-02-04 | End: 2021-02-04 | Stop reason: SURG

## 2021-02-04 RX ORDER — TRANEXAMIC ACID 10 MG/ML
INJECTION, SOLUTION INTRAVENOUS AS NEEDED
Status: DISCONTINUED | OUTPATIENT
Start: 2021-02-04 | End: 2021-02-04 | Stop reason: SURG

## 2021-02-04 RX ORDER — ONDANSETRON 2 MG/ML
4 INJECTION INTRAMUSCULAR; INTRAVENOUS ONCE AS NEEDED
Status: ACTIVE | OUTPATIENT
Start: 2021-02-04 | End: 2021-02-04

## 2021-02-04 RX ORDER — BUPIVACAINE HYDROCHLORIDE 2.5 MG/ML
INJECTION, SOLUTION EPIDURAL; INFILTRATION; INTRACAUDAL AS NEEDED
Status: DISCONTINUED | OUTPATIENT
Start: 2021-02-04 | End: 2021-02-04

## 2021-02-04 RX ORDER — HYDROCODONE BITARTRATE AND ACETAMINOPHEN 5; 325 MG/1; MG/1
2 TABLET ORAL AS NEEDED
Status: DISCONTINUED | OUTPATIENT
Start: 2021-02-04 | End: 2021-02-04 | Stop reason: HOSPADM

## 2021-02-04 RX ORDER — HYDROMORPHONE HYDROCHLORIDE 1 MG/ML
0.6 INJECTION, SOLUTION INTRAMUSCULAR; INTRAVENOUS; SUBCUTANEOUS
Status: ACTIVE | OUTPATIENT
Start: 2021-02-04 | End: 2021-02-05

## 2021-02-04 RX ADMIN — EPHEDRINE SULFATE 10 MG: 50 INJECTION INTRAVENOUS at 09:49:00

## 2021-02-04 RX ADMIN — DEXAMETHASONE SODIUM PHOSPHATE 4 MG: 4 MG/ML VIAL (ML) INJECTION at 08:45:00

## 2021-02-04 RX ADMIN — LIDOCAINE HYDROCHLORIDE 10 MG: 10 INJECTION, SOLUTION EPIDURAL; INFILTRATION; INTRACAUDAL; PERINEURAL at 08:22:00

## 2021-02-04 RX ADMIN — SODIUM CHLORIDE, SODIUM LACTATE, POTASSIUM CHLORIDE, CALCIUM CHLORIDE: 600; 310; 30; 20 INJECTION, SOLUTION INTRAVENOUS at 09:19:00

## 2021-02-04 RX ADMIN — MIDAZOLAM HYDROCHLORIDE 2 MG: 1 INJECTION INTRAMUSCULAR; INTRAVENOUS at 08:16:00

## 2021-02-04 RX ADMIN — ONDANSETRON 4 MG: 2 INJECTION INTRAMUSCULAR; INTRAVENOUS at 08:45:00

## 2021-02-04 RX ADMIN — TRANEXAMIC ACID 1000 MG: 10 INJECTION, SOLUTION INTRAVENOUS at 08:30:00

## 2021-02-04 RX ADMIN — MORPHINE SULFATE 0.2 MG: 1 INJECTION, SOLUTION EPIDURAL; INTRATHECAL; INTRAVENOUS at 08:24:00

## 2021-02-04 RX ADMIN — CEFAZOLIN SODIUM/WATER 2 G: 2 G/20 ML SYRINGE (ML) INTRAVENOUS at 08:25:00

## 2021-02-04 RX ADMIN — SODIUM CHLORIDE, SODIUM LACTATE, POTASSIUM CHLORIDE, CALCIUM CHLORIDE: 600; 310; 30; 20 INJECTION, SOLUTION INTRAVENOUS at 09:58:00

## 2021-02-04 RX ADMIN — BUPIVACAINE HYDROCHLORIDE 1.5 ML: 7.5 INJECTION, SOLUTION INTRASPINAL at 08:24:00

## 2021-02-04 NOTE — CM/SW NOTE
DEMETRIO met with the pt. At bedside. The pt. Lives with his wife in a 2 level home with 5 steps to the main level and 7 stairs down to the lower level. The pt. Reports being independent prior to admission with adls, ambulation and driving.   The pt's wife also

## 2021-02-04 NOTE — H&P
Please refer to complete H&P titled \"progress note\" on 1/26/21 performed by Dr Howard Aponte. Note reviewed and no changes from H&P referenced above.

## 2021-02-04 NOTE — PLAN OF CARE
Pt received from PACU around 1220. A+Ox4, VSS, saturating well on room air, denies pain at this time. Pt stated he has decreased sensation to his R foot d/t duramorph. Surgical dressing to L hip in place, CDI.  Urinary cath in place, draining clear yellow u sites and surrounding tissue  - Implement wound care per orders  - Initiate isolation precautions as appropriate  - Initiate Pressure Ulcer prevention bundle as indicated  Outcome: Progressing     Problem: MUSCULOSKELETAL - ADULT  Goal: Return mobility to to reduce risk of injury  - Provide assistive devices as appropriate  - Consider OT/PT consult to assist with strengthening/mobility  - Encourage toileting schedule  Outcome: Progressing     Problem: DISCHARGE PLANNING  Goal: Discharge to home or other fac

## 2021-02-04 NOTE — ANESTHESIA POSTPROCEDURE EVALUATION
Patient: Anuja Blackburn    Procedure Summary     Date: 02/04/21 Room / Location: [address] [place] [address] MAIN OR 12 / [address] [place] [address] MAIN OR    Anesthesia Start: 3454 Anesthesia Stop:     Procedure: HIP TOTAL REPLACEMENT (Left Hip) Diagnosis: (degenerative joint disease left hip)    S

## 2021-02-04 NOTE — PROGRESS NOTES
[facility]    Progress Note    Kimberlee Rose Patient Status:  Inpatient    1945 MRN [id number]   Location [address] UNIT Attending Dc Fair MD   Hosp Day # 0 PCP Leatha Magallon MD        Sub Results:     Lab Results   Component Value Date    WBC 4.3 01/20/2021    HGB 14.1 01/20/2021    HCT 44.2 01/20/2021    .0 01/20/2021    CREATSERUM 0.97 01/20/2021    BUN 21 (H) 01/20/2021     01/20/2021    K 4.1 01/20/2021     01/20/2

## 2021-02-04 NOTE — ANESTHESIA PREPROCEDURE EVALUATION
Anesthesia PreOp Note    HPI:     Tato Loya is a 70year old male who presents for preoperative consultation requested by: Dakotah Partida MD    Date of Surgery: 2/4/2021    Procedure(s):  HIP TOTAL REPLACEMENT  Indication: degenerative joint disea •  losartan 100 MG Oral Tab, Take 1 tablet (100 mg total) by mouth daily. , Disp: 90 tablet, Rfl: 0, 2/3/2021 at 0900    •  atorvastatin 40 MG Oral Tab, Take 1.5 tablets (60 mg total) by mouth nightly., Disp: 135 tablet, Rfl: 1, 2/3/2021 at 0900    •  hydro Worry: Not on file        Inability: Not on file      Transportation needs        Medical: Not on file        Non-medical: Not on file    Tobacco Use      Smoking status: Former Smoker        Packs/day: 0.50        Years: 14.00        Pack years: 7 Bad sunburns in the past: No        Tanning Salons in the Past: No        Hx of Radiation Treatments: No        Regular use of sun block: Not Asked    Social History Narrative      Not on file      Available pre-op labs reviewed.   Lab Results   [place] Plan Comments: I have informed Sheryl Zafar of the risks of spinal anesthesia including, but not limited to: failure, headache, backache, difficulty breathing, infection, bleeding, nerve damage, paralysis, death.  The patient desires the proposed anesth

## 2021-02-04 NOTE — ANESTHESIA PROCEDURE NOTES
Spinal Block  Performed by: Mercy Davison., CRNA  Authorized by: Enid Noguera MD       General Information and Staff    Start Time:  2/4/2021 8:22 AM  End Time:  2/4/2021 8:24 AM  Anesthesiologist:  Enid Noguera MD  CRNA:  Mercy Davison

## 2021-02-04 NOTE — H&P
History & Physical Examination    Patient Name: Kathia Montejo  MRN: [id number]  CSN: [id number]  YOB: 1945    Diagnosis: L hip DJD    Present Illness: Kathia Montejo is a 70year old yo male with a history of L hip end stage DJD.   Cleared allergies.     Past Medical History:   Diagnosis Date   • Actinic keratosis    • Acute carpal tunnel syndrome of left wrist 06/04/2018   • BCC (basal cell carcinoma of skin)    • Calculus of kidney    • Cancer (HCC)    • High blood pressure    • High choles

## 2021-02-04 NOTE — OPERATIVE REPORT
[facility]    LA Brief Operative Note    Zayda Sesay Patient Status:  Inpatient    1945 MRN [id number]   Location [facility] PRE OP RECOVERY Attending Nakia Page MD     PCP Kerney Bence, MD       Preop DX: left h

## 2021-02-04 NOTE — OPERATIVE REPORT
[address] [place] [address], [facility] [address]    OPERATIVE REPORT    PATIENT NAME: Leonid Lambert  MR#: [id number]    DATE OF PROCEDURE: 2/4/2021  PREOPERATIVE DIAGNOSIS: Degenerative Arthritis (e.g., OA) of the Left hip  POS of the left hip, he was indicated for a left total hip arthroplasty. We reviewed the risks, benefits and alternatives to the procedure and informed consent was obtained.  The risks discussed included, but were not limited, to infection, nerve injury (sciati then identified the external rotators on the posterior aspect of the proximal femur and elevated these muscles and the capsular layer off the posterior aspect of the proximal femur as a single sleeve of tissue using the bovie cautery.  This gave us excellen trialed our 36mm, -3.5mm offset head and noted that we had good range of motion, no impingement and good stability of the hip and that this would be our final implant.  We then opened up our size 11 offset Alteon Tapered Wedge Femoral Stem primary hip prost inpatient with an anticipated length of stay of 2-3 nights prior to discharge. I was present for all critical portions of the surgery and a backup surgeon was available at all times in case of emergency.     DICTATED BY: Bong Lewis,  2021-02-04

## 2021-02-05 LAB
BASOPHILS # BLD AUTO: 0.01 X10(3) UL (ref 0–0.2)
BASOPHILS NFR BLD AUTO: 0.2 %
DEPRECATED RDW RBC AUTO: 44 FL (ref 35.1–46.3)
EOSINOPHIL # BLD AUTO: 0.02 X10(3) UL (ref 0–0.7)
EOSINOPHIL NFR BLD AUTO: 0.3 %
ERYTHROCYTE [DISTWIDTH] IN BLOOD BY AUTOMATED COUNT: 11.8 % (ref 11–15)
HCT VFR BLD AUTO: 32.5 %
HGB BLD-MCNC: 10.6 G/DL
IMM GRANULOCYTES # BLD AUTO: 0.02 X10(3) UL (ref 0–1)
IMM GRANULOCYTES NFR BLD: 0.3 %
LYMPHOCYTES # BLD AUTO: 1.22 X10(3) UL (ref 1–4)
LYMPHOCYTES NFR BLD AUTO: 21 %
MCH RBC QN AUTO: 33.2 PG (ref 26–34)
MCHC RBC AUTO-ENTMCNC: 32.6 G/DL (ref 31–37)
MCV RBC AUTO: 101.9 FL
MONOCYTES # BLD AUTO: 0.74 X10(3) UL (ref 0.1–1)
MONOCYTES NFR BLD AUTO: 12.8 %
NEUTROPHILS # BLD AUTO: 3.79 X10 (3) UL (ref 1.5–7.7)
NEUTROPHILS # BLD AUTO: 3.79 X10(3) UL (ref 1.5–7.7)
NEUTROPHILS NFR BLD AUTO: 65.4 %
PLATELET # BLD AUTO: 183 10(3)UL (ref 150–450)
RBC # BLD AUTO: 3.19 X10(6)UL
WBC # BLD AUTO: 5.8 X10(3) UL (ref 4–11)

## 2021-02-05 PROCEDURE — 99233 SBSQ HOSP IP/OBS HIGH 50: CPT | Performed by: HOSPITALIST

## 2021-02-05 NOTE — CM/SW NOTE
SW followed up on DC planning. Per RN pt would not be ready due to RRT. Would most likely be ready tomorrow. New [place] Orders were received and attached them via [facility] to [facility] [place] [address].  [facility] made aware of discharge through [id number]

## 2021-02-05 NOTE — PROGRESS NOTES
[facility]    Progress Note    Shania Butts Patient Status:  Inpatient    1945 MRN [id number]   Location [facility] 4W/SW/SE Attending Jonathon Sher,  [facility] Day # 1 PCP Kayla Thompson MD       Subjective:   Jong Watson osteoarthritis.     Dictated by (CST): Giselle Grijalva MD on 2/04/2021 at 12:26 PM     Finalized by (CST): Giselle Grijalva MD on 2/04/2021 at 12:28 PM          Ekg 12-lead    Result Date: 2/5/2021  ECG Report  Interpretation  --------------------------     •

## 2021-02-05 NOTE — PROGRESS NOTES
RRT    *See RRT Documentation Record*    Reason the RRT was called: hypotension and bradycardia   Assessment of patient leading up to RRT: pt had physical therapy session at 745. pt was sitting up in chair, BP was 128/77.  At [address] Wilber Amaya pt called stating he was f

## 2021-02-05 NOTE — PROGRESS NOTES
[facility]    Progress Note    Kimberlee Rose Patient Status:  Inpatient    1945 MRN [id number]   Location [facility] 4W/SW/SE Attending Dc Fair MD   Hosp Day # 1 PCP Leatha Magallon MD       Subjective:   Augustin Lennon 01/20/2021    TP 7.5 01/20/2021    AST 20 01/20/2021    ALT 30 01/20/2021    TSH 1.23 08/17/2012    PSA 1.6 10/15/2018    ESRML 11 09/12/2018    CRP 0.6 09/12/2018     09/12/2018    B12 485 05/02/2018       Xr Pelvis (1 View) (cpt=72170)    Result Da

## 2021-02-05 NOTE — PLAN OF CARE
Pt A+Ox4. Vital signs now stable, /56. Denies feeling dizzy, lighthead, or faint. Up with x1 assist and walker. Surgical dressing to L hip in place, CDI. Bed in lowest position and locked, call light within reach.  Plan to discharge home with home hea transferring and ambulating w/ or w/o assistive devices  - Assist with transfers and ambulation using safe patient handling equipment as needed  - Ensure adequate protection for wounds/incisions during mobilization  - Obtain PT/OT consults as needed  - Adv Include patient/family/discharge partner in discharge planning  - Arrange for needed discharge resources and transportation as appropriate  - Identify discharge learning needs (meds, wound care, etc)  - Arrange for interpreters to assist at discharge as ne

## 2021-02-05 NOTE — PLAN OF CARE
Problem: Patient Centered Care  Goal: Patient preferences are identified and integrated in the patient's plan of care  Description: Interventions:  - What would you like us to know as we care for you?   - Provide timely, complete, and accurate informatio evaluate response  - Implement non-pharmacological measures as appropriate and evaluate response  - Consider cultural and social influences on pain and pain management  - Manage/alleviate anxiety  - Utilize distraction and/or relaxation techniques  - Monit support system  Outcome: Progressing     Pt alert and oriented. Surgical dressing CDI. Ambulating with one person assist and the walker. SCDs bilaterally. Voiding via mcgill cath. Tolerating diet.  Patient pain controled, giving PRN norco. Call light within

## 2021-02-05 NOTE — ANESTHESIA POST-OP FOLLOW-UP NOTE
[facility] - [facility]   Acute Pain Rounds Note  2021    Patient name: Shania Butts 70year old male  : 1945  MRN: [id number]    Diagnosis: Primary osteoarthritis of left hip  (primary encounter diagnosis)  Preop testing    S/P: LA    P

## 2021-02-05 NOTE — PROGRESS NOTES
This  reported for RRT. No opportunity for interaction.      02/05/21 0829   Clinical Encounter Type   Visited With Patient not available

## 2021-02-05 NOTE — PROGRESS NOTES
S: Pain well controlled. Comfortable. Denies chest pain/sob. Denies numbness/tingling.     O: ./82 (BP Location: Right arm)   Pulse 66   Temp 98.8 °F (37.1 °C) (Oral)   Resp 18   Ht 6' 1\" (1.854 m)   Wt 185 lb (83.9 kg)   SpO2 98%   BMI 24.41 kg/m²

## 2021-02-05 NOTE — PHYSICAL THERAPY NOTE
PHYSICAL THERAPY HIP EVALUATION - INPATIENT     Room Number: 280/617-T  Evaluation Date: 2/4/2021  Type of Evaluation: Initial  Physician Order: PT Eval and Treat    Presenting Problem: L LA - Posterior  Reason for Therapy: Mobility Dysfunction and Discha current admission: Per H&P: Isiah Hui is a 70year old yo male with a history of L hip end stage DJD. Cleared by their PMD for a L LA. There are no contraindications to proceed with surgery today.   Informed consent has been obtained and all RBA COGNITION  · Overall Cognitive Status:  WFL - within functional limits        BALANCE  Static Sitting: Fair +  Dynamic Sitting: Fair +  Static Standing: Fair -  Dynamic Standing: Poor +    ADDITIONAL TESTS                                 ACTIVITY Cristal Yee level: modified independent     Goal #2  Current Status    Goal #3 Patient is able to ambulate 300 feet with assistive device at assistance level: modified independent    Goal #3   Current Status    Goal #4 Patient will negotiate 13 stairs/one curb w/ assi

## 2021-02-06 LAB
BASOPHILS # BLD AUTO: 0.02 X10(3) UL (ref 0–0.2)
BASOPHILS NFR BLD AUTO: 0.3 %
DEPRECATED RDW RBC AUTO: 44.4 FL (ref 35.1–46.3)
EOSINOPHIL # BLD AUTO: 0.05 X10(3) UL (ref 0–0.7)
EOSINOPHIL NFR BLD AUTO: 0.8 %
ERYTHROCYTE [DISTWIDTH] IN BLOOD BY AUTOMATED COUNT: 11.9 % (ref 11–15)
HCT VFR BLD AUTO: 31.2 %
HGB BLD-MCNC: 10 G/DL
IMM GRANULOCYTES # BLD AUTO: 0.03 X10(3) UL (ref 0–1)
IMM GRANULOCYTES NFR BLD: 0.5 %
LYMPHOCYTES # BLD AUTO: 1.04 X10(3) UL (ref 1–4)
LYMPHOCYTES NFR BLD AUTO: 16.5 %
MCH RBC QN AUTO: 32.7 PG (ref 26–34)
MCHC RBC AUTO-ENTMCNC: 32.1 G/DL (ref 31–37)
MCV RBC AUTO: 102 FL
MONOCYTES # BLD AUTO: 0.85 X10(3) UL (ref 0.1–1)
MONOCYTES NFR BLD AUTO: 13.4 %
NEUTROPHILS # BLD AUTO: 4.33 X10 (3) UL (ref 1.5–7.7)
NEUTROPHILS # BLD AUTO: 4.33 X10(3) UL (ref 1.5–7.7)
NEUTROPHILS NFR BLD AUTO: 68.5 %
PLATELET # BLD AUTO: 165 10(3)UL (ref 150–450)
RBC # BLD AUTO: 3.06 X10(6)UL
WBC # BLD AUTO: 6.3 X10(3) UL (ref 4–11)

## 2021-02-06 PROCEDURE — 99233 SBSQ HOSP IP/OBS HIGH 50: CPT | Performed by: HOSPITALIST

## 2021-02-06 NOTE — OCCUPATIONAL THERAPY NOTE
OCCUPATIONAL THERAPY EVALUATION - INPATIENT      Room Number: 952/851-V  Evaluation Date: 2/6/2021  Type of Evaluation: Initial  Presenting Problem: (s/p L LA; posterior )    Physician Order: IP Consult to Occupational Therapy  Reason for Therapy: ADL/IAD precautions, decreased safety awareness, balance. These deficits manifest functionally while performing ADLs, transfer skills, bed mobility.    The patient is below baseline and would benefit from skilled inpatient OT to address the above deficits, maximiz Chapis Conley MD at [address] MAIN OR   • OTHER      squamous cell removal on the leg   • OTHER Left     has retained needle from stepping on needle    • TOTAL HIP REPLACEMENT Left 02/04/2021   • WRIST ARTHROSCOP,RELEASE [place] OLVERA Left 06/26/2018    Left endoscopic c regular lower body clothing?: A Lot  -   Bathing (including washing, rinsing, drying)?: A Little  -   Toileting, which includes using toilet, bedpan or urinal? : A Little  -   Putting on and taking off regular upper body clothing?: None  -   Taking care of

## 2021-02-06 NOTE — PHYSICAL THERAPY NOTE
PHYSICAL THERAPY HIP TREATMENT NOTE - INPATIENT    Room Number: 294/845-D            Presenting Problem: L LA Posterior    Problem List  Principal Problem:    Primary osteoarthritis of left hip  Active Problems:    Essential hypertension    Hypercholester HHPT pending d/t need stair climbing prior to discharge home     DISCHARGE RECOMMENDATIONS  PT Discharge Recommendations: Home with home health PT    PLAN  PT Treatment Plan: Bed mobility; Patient education; Family education;Gait training;Strengthening;Stair SBQC and without rail with [employer] [place]    Additional Information:     Exercises AM Session   Ankle Pumps     20 reps   Quad Sets 20 reps   Glut Sets 20 reps   Hip abd 20 reps   Heel slide 20 reps         Patient End of Session: Up in chair;Needs met;Call light wi

## 2021-02-06 NOTE — PROGRESS NOTES
[facility]    Progress Note    Peggy Gan Patient Status:  Inpatient    1945 MRN [id number]   Location [facility] 4W/SW/SE Attending Judy Thakur, [address] [facility]  Day # 2 PCP Oumou Massey MD       Subjective:   Joni Drivers -------------------------- Sinus Bradycardia -Anterolateral ST-elevation -repolarization variant.  PROBABLY NORMAL When compared with ECG of 01/20/2021 11:15:13 ST changes are now seen Electronically signed on 02/05/2021 at 12:03 by Alex Salguero MD    • r

## 2021-02-06 NOTE — PLAN OF CARE
Pt a/o x4. Vitals signs stable, room air, cms intact. No signs of acute distress. Pt ambulating standby assist with walker. General diet tolerating well. Voiding freely via urinal. Voids rocco amounts. Complaining of mild to moderate pain.  Being managed w ulcer development  - Assess and document skin integrity  - Assess and document dressing/incision, wound bed, drain sites and surrounding tissue  - Implement wound care per orders  - Initiate isolation precautions as appropriate  - Initiate Pressure Ulcer p FALL  Goal: Free from fall injury  Description: INTERVENTIONS:  - Assess pt frequently for physical needs  - Identify cognitive and physical deficits and behaviors that affect risk of falls.   - [place] fall precautions as indicated by assessment.  - Educ

## 2021-02-06 NOTE — PROGRESS NOTES
[facility]    Progress Note    Angy Fraser Patient Status:  Inpatient    1945 MRN [id number]   Location [facility] 4W/SW/SE Attending Ralf Nelson, [address] [facility] Day # 2 PCP Melony Amos MD       Subjective:   Cate Hernández 107 02/04/2021    CO2 29.0 02/04/2021     (H) 02/04/2021    CA 9.3 02/04/2021    ALB 3.9 01/20/2021    ALKPHO 67 01/20/2021    BILT 0.8 01/20/2021    TP 7.5 01/20/2021    AST 20 01/20/2021    ALT 30 01/20/2021    TSH 1.23 08/17/2012    PSA 1.6 10/15

## 2021-02-07 VITALS
DIASTOLIC BLOOD PRESSURE: 72 MMHG | BODY MASS INDEX: 24.52 KG/M2 | RESPIRATION RATE: 16 BRPM | TEMPERATURE: 100 F | WEIGHT: 185 LBS | HEART RATE: 95 BPM | HEIGHT: 73 IN | OXYGEN SATURATION: 96 % | SYSTOLIC BLOOD PRESSURE: 127 MMHG

## 2021-02-07 LAB
ANION GAP SERPL CALC-SCNC: 4 MMOL/L (ref 0–18)
BASOPHILS # BLD AUTO: 0.02 X10(3) UL (ref 0–0.2)
BASOPHILS NFR BLD AUTO: 0.4 %
BUN BLD-MCNC: 16 MG/DL (ref 7–18)
BUN/CREAT SERPL: 18 (ref 10–20)
CALCIUM BLD-MCNC: 9.1 MG/DL (ref 8.5–10.1)
CHLORIDE SERPL-SCNC: 106 MMOL/L (ref 98–112)
CO2 SERPL-SCNC: 29 MMOL/L (ref 21–32)
CREAT BLD-MCNC: 0.89 MG/DL
DEPRECATED RDW RBC AUTO: 42.7 FL (ref 35.1–46.3)
EOSINOPHIL # BLD AUTO: 0.09 X10(3) UL (ref 0–0.7)
EOSINOPHIL NFR BLD AUTO: 1.6 %
ERYTHROCYTE [DISTWIDTH] IN BLOOD BY AUTOMATED COUNT: 11.6 % (ref 11–15)
GLUCOSE BLD-MCNC: 101 MG/DL (ref 70–99)
HCT VFR BLD AUTO: 31.8 %
HGB BLD-MCNC: 10.4 G/DL
IMM GRANULOCYTES # BLD AUTO: 0.02 X10(3) UL (ref 0–1)
IMM GRANULOCYTES NFR BLD: 0.4 %
LYMPHOCYTES # BLD AUTO: 1.11 X10(3) UL (ref 1–4)
LYMPHOCYTES NFR BLD AUTO: 19.6 %
MCH RBC QN AUTO: 32.8 PG (ref 26–34)
MCHC RBC AUTO-ENTMCNC: 32.7 G/DL (ref 31–37)
MCV RBC AUTO: 100.3 FL
MONOCYTES # BLD AUTO: 0.82 X10(3) UL (ref 0.1–1)
MONOCYTES NFR BLD AUTO: 14.5 %
NEUTROPHILS # BLD AUTO: 3.6 X10 (3) UL (ref 1.5–7.7)
NEUTROPHILS # BLD AUTO: 3.6 X10(3) UL (ref 1.5–7.7)
NEUTROPHILS NFR BLD AUTO: 63.5 %
OSMOLALITY SERPL CALC.SUM OF ELEC: 289 MOSM/KG (ref 275–295)
PLATELET # BLD AUTO: 186 10(3)UL (ref 150–450)
POTASSIUM SERPL-SCNC: 4 MMOL/L (ref 3.5–5.1)
RBC # BLD AUTO: 3.17 X10(6)UL
SODIUM SERPL-SCNC: 139 MMOL/L (ref 136–145)
WBC # BLD AUTO: 5.7 X10(3) UL (ref 4–11)

## 2021-02-07 PROCEDURE — 99239 HOSP IP/OBS DSCHRG MGMT >30: CPT | Performed by: HOSPITALIST

## 2021-02-07 NOTE — DISCHARGE SUMMARY
[facility]    Discharge Summary    Ulises Niño Patient Status:  Inpatient    1945 MRN [id number]   Location [facility] 4W/SW/SE Attending Lakshmi Clancy MD   Hosp Day # 3 PCP Ervin Oshea MD     Date of Admission:  home       Essential hypertension  CONT HOME MEDS, MONITOR.        Hypercholesterolemia  CONT HOME MEDS        Quality:  · DVT Prophylaxis: Xarelto  · CODE status: Full       Complications: none    Consultants     Provider Role Specialty    Minal Hansen Information     Geena Chauhan MD. Call in 2 weeks. Specialty: SURGERY, ORTHOPEDIC  Why: For wound re-check  Contact information:  [address] [facility]             Christiana White MD In 1 week.     Specialty: [facility] from the home health agency will contact you or your family to schedule your first visit.             Time spent:  > 30 minutes    Roseline Chapman  2/7/2021

## 2021-02-07 NOTE — PROGRESS NOTES
[facility]    Progress Note    Mary West Patient Status:  Inpatient    1945 MRN [id number]   Location [facility] 4W/SW/SE Attending Yesy Sal, [address] [facility] [address] Day # 3 PCP Gabriela Lopez MD       Subjective:   Irina Gee 08/17/2012    PSA 1.6 10/15/2018    ESRML 11 09/12/2018    CRP 0.6 09/12/2018     09/12/2018    B12 485 05/02/2018       No results found.           Wan Goodwin MD  [facility]  [phone] (c)  [phone] (o)  2/7/2021

## 2021-02-07 NOTE — PHYSICAL THERAPY NOTE
PHYSICAL THERAPY HIP TREATMENT NOTE - INPATIENT    Room Number: 912/196-F            Presenting Problem: L LA Posterior    Problem List  Principal Problem:    Primary osteoarthritis of left hip  Active Problems:    Essential hypertension    Hypercholester RESTRICTION  Weight Bearing Restriction: L lower extremity           L Lower Extremity: Weight Bearing as Tolerated    PAIN ASSESSMENT   Ratin  Location: L hip  Management Techniques: Activity promotion; Body mechanics; Relaxation;Repositioning    [facility] modified independent     Goal #2  Current Status SBA with RW   Goal #3 Patient is able to ambulate 300 feet with assistive device at assistance level: modified independent    Goal #3   Current Status 300' with the RW SBA   Goal #4 Patient will negotiate 15

## 2021-02-07 NOTE — PLAN OF CARE
KAELA IS A/O X4, V/S STABLE, ON RA. PAIN MANAGED WITH PRN NORCO. AMBULATES WITH SB ASST AND WALKER. ABDUCTOR PILLOW IN PLACE IN BED. COMPLIANT WITH POSTERIOR HIP PRECAUTIONS. PLAN TO DISCHARGE HOME WITH HHC AFTER PT TODAY. WILL CONTINUE TO MONITOR.      Pro Progressing     Problem: Patient Centered Care  Goal: Patient preferences are identified and integrated in the patient's plan of care  Description: Interventions:  - What would you like us to know as we care for you?  FROM HOME WITH WIFE  - Provide timely, Discharge to home or other facility with appropriate resources  Description: INTERVENTIONS:  - Identify barriers to discharge w/pt and caregiver  - Include patient/family/discharge partner in discharge planning  - Arrange for needed discharge resources and

## 2021-02-07 NOTE — PLAN OF CARE
Problem: Patient Centered Care  Goal: Patient preferences are identified and integrated in the patient's plan of care  Description: Interventions:  - What would you like us to know as we care for you? **  - Provide timely, complete, and accurate informat Return mobility to safest level of function  Description: INTERVENTIONS:  - Assess patient stability and activity tolerance for standing, transferring and ambulating w/ or w/o assistive devices  - Assist with transfers and ambulation using safe patient zamora injury  - Provide assistive devices as appropriate  - Consider OT/PT consult to assist with strengthening/mobility  - Encourage toileting schedule  2/7/2021 1121 by Coby Stevens RN  Outcome: Completed  2/7/2021 1121 by Coby Stevens RN  Outcome: Adequ

## 2021-02-10 ENCOUNTER — TELEPHONE (OUTPATIENT)
Dept: NEPHROLOGY | Facility: CLINIC | Age: 76
End: 2021-02-10

## 2021-02-10 NOTE — TELEPHONE ENCOUNTER
Medication PA Requested:                                                          CoverMyMeds Used:  Key:  Sig:   DX Code:                                     CPT code (if applicable):   Case Number/Pending Ref#:    Pt authorization number:  Ian Seay

## 2021-02-10 NOTE — TELEPHONE ENCOUNTER
Patient called in to get refill for medication atorvastatin 40 MG Oral Tab. He states it needs prior authorization.  Please follow up

## 2021-02-11 RX ORDER — ATORVASTATIN CALCIUM 40 MG/1
60 TABLET, FILM COATED ORAL NIGHTLY
Qty: 135 TABLET | Refills: 1 | Status: SHIPPED | OUTPATIENT
Start: 2021-02-11 | End: 2021-08-24

## 2021-02-11 NOTE — TELEPHONE ENCOUNTER
Last seen 1/26/21. Last lipid panel was done on 1/20/21. Refill(s) pended and routed to Dr. León Barnhart.

## 2021-02-11 NOTE — TELEPHONE ENCOUNTER
Pharmacy states they need new prescription sent, no PA needed. Medication PA Requested: Atrovastatin                                                          CoverMyMeds Used:  No  Sig:  Take 1.5 tablets (60 mg total) by mouth nightly.   DX Code:    E7

## 2021-03-02 ENCOUNTER — NURSE ONLY (OUTPATIENT)
Dept: NEPHROLOGY | Facility: CLINIC | Age: 76
End: 2021-03-02
Payer: MEDICARE

## 2021-03-02 PROCEDURE — 90750 HZV VACC RECOMBINANT IM: CPT | Performed by: INTERNAL MEDICINE

## 2021-03-02 PROCEDURE — 90471 IMMUNIZATION ADMIN: CPT | Performed by: INTERNAL MEDICINE

## 2021-03-02 PROCEDURE — 1111F DSCHRG MED/CURRENT MED MERGE: CPT | Performed by: INTERNAL MEDICINE

## 2021-03-02 NOTE — PROGRESS NOTES
Patient presented for second shingrix vaccine. Placed on left deltoid and patient tolerated well with no adverse reactions noted.

## 2021-03-08 RX ORDER — LOSARTAN POTASSIUM 100 MG/1
100 TABLET ORAL DAILY
Qty: 90 TABLET | Refills: 1 | Status: SHIPPED | OUTPATIENT
Start: 2021-03-08 | End: 2021-08-23

## 2021-03-10 RX ORDER — HYDROCHLOROTHIAZIDE 12.5 MG/1
12.5 TABLET ORAL DAILY
Qty: 90 TABLET | Refills: 1 | Status: SHIPPED | OUTPATIENT
Start: 2021-03-10 | End: 2021-08-23

## 2021-03-18 ENCOUNTER — IMMUNIZATION (OUTPATIENT)
Dept: LAB | Facility: HOSPITAL | Age: 76
End: 2021-03-18
Attending: HOSPITALIST
Payer: MEDICARE

## 2021-03-18 DIAGNOSIS — Z23 NEED FOR VACCINATION: Primary | ICD-10-CM

## 2021-03-18 PROCEDURE — 0011A SARSCOV2 VAC 100MCG/0.5ML IM: CPT

## 2021-03-19 NOTE — TELEPHONE ENCOUNTER
LOV 7/19/17. Last lipid panel was done on 10/2/17. Render Risk Assessment In Note?: no Detail Level: Simple Other (Free Text): Patient has an appointment with his regular dermatologist in 6 months Note Text (......Xxx Chief Complaint.): This diagnosis correlates with the

## 2021-04-15 ENCOUNTER — IMMUNIZATION (OUTPATIENT)
Dept: LAB | Facility: HOSPITAL | Age: 76
End: 2021-04-15
Attending: EMERGENCY MEDICINE
Payer: MEDICARE

## 2021-04-15 DIAGNOSIS — Z23 NEED FOR VACCINATION: Primary | ICD-10-CM

## 2021-04-15 PROCEDURE — 0012A SARSCOV2 VAC 100MCG/0.5ML IM: CPT

## 2021-05-05 ENCOUNTER — HOSPITAL ENCOUNTER (OUTPATIENT)
Dept: BONE DENSITY | Facility: HOSPITAL | Age: 76
Discharge: HOME | End: 2021-05-05
Attending: INTERNAL MEDICINE
Payer: MEDICARE

## 2021-05-05 DIAGNOSIS — M81.0 OSTEOPOROSIS, UNSPECIFIED OSTEOPOROSIS TYPE, UNSPECIFIED PATHOLOGICAL FRACTURE PRESENCE: ICD-10-CM

## 2021-05-05 PROCEDURE — 77080 DXA BONE DENSITY AXIAL: CPT | Performed by: INTERNAL MEDICINE

## 2021-07-13 ENCOUNTER — OFFICE VISIT (OUTPATIENT)
Dept: NEPHROLOGY | Facility: CLINIC | Age: 76
End: 2021-07-13
Payer: MEDICARE

## 2021-07-13 VITALS
HEIGHT: 72 IN | DIASTOLIC BLOOD PRESSURE: 67 MMHG | WEIGHT: 191 LBS | BODY MASS INDEX: 25.87 KG/M2 | SYSTOLIC BLOOD PRESSURE: 130 MMHG | HEART RATE: 72 BPM

## 2021-07-13 DIAGNOSIS — Z01.818 PREOP EXAM FOR INTERNAL MEDICINE: Primary | ICD-10-CM

## 2021-07-13 DIAGNOSIS — I10 ESSENTIAL HYPERTENSION: ICD-10-CM

## 2021-07-13 DIAGNOSIS — E78.00 PURE HYPERCHOLESTEROLEMIA: ICD-10-CM

## 2021-07-13 DIAGNOSIS — D64.9 ANEMIA, UNSPECIFIED TYPE: ICD-10-CM

## 2021-07-13 PROCEDURE — 99215 OFFICE O/P EST HI 40 MIN: CPT | Performed by: INTERNAL MEDICINE

## 2021-07-13 RX ORDER — MAG HYDROX/ALUMINUM HYD/SIMETH 400-400-40
5000 SUSPENSION, ORAL (FINAL DOSE FORM) ORAL DAILY
COMMUNITY

## 2021-07-13 NOTE — PROGRESS NOTES
[address] [facility]  Nephrology Daily Progress Note    Abi Fan  [id number]  70year old  Patient presents with: Follow - Up: follow up, pre op clearance knee surgery       HPI:   Abi Fan is a 70year old male.   68-year-old male with a histor 25.9 -       VITALS: WEIGHT ONLY 2/1/2021 2/4/2021 7/13/2021   Weight 182 lbs 185 lbs 191 lbs       Constitutional: appears well hydrated alert and responsive no acute distress noted  Neck/Thyroid: neck is supple without adenopathy  Lymphatic: no abnormal takes 5000 units every other day, Disp: , Rfl:   •  Probiotic Product (PROBIOTIC-10 OR), Take by mouth daily. Pt unsure of dose, Disp: , Rfl:   •  hydrochlorothiazide 12.5 MG Oral Tab, Take 1 tablet (12.5 mg total) by mouth daily. , Disp: 90 tablet, Rfl: 1 And [facility]      EKG 12 Lead to be performed at [facility]      Type and screen      Nasal Culture- MRSA      7/13/2021  Oumou Massey MD

## 2021-07-21 ENCOUNTER — LAB ENCOUNTER (OUTPATIENT)
Dept: LAB | Facility: HOSPITAL | Age: 76
End: 2021-07-21
Attending: INTERNAL MEDICINE
Payer: MEDICARE

## 2021-07-21 ENCOUNTER — HOSPITAL ENCOUNTER (OUTPATIENT)
Dept: GENERAL RADIOLOGY | Facility: HOSPITAL | Age: 76
Discharge: HOME | End: 2021-07-21
Attending: INTERNAL MEDICINE
Payer: MEDICARE

## 2021-07-21 DIAGNOSIS — I10 ESSENTIAL HYPERTENSION: ICD-10-CM

## 2021-07-21 DIAGNOSIS — E78.00 PURE HYPERCHOLESTEROLEMIA: ICD-10-CM

## 2021-07-21 DIAGNOSIS — Z01.818 PREOP EXAM FOR INTERNAL MEDICINE: ICD-10-CM

## 2021-07-21 DIAGNOSIS — D64.9 ANEMIA, UNSPECIFIED TYPE: ICD-10-CM

## 2021-07-21 LAB
ALBUMIN SERPL-MCNC: 3.9 G/DL (ref 3.4–5)
ALBUMIN/GLOB SERPL: 1.1 {RATIO} (ref 1–2)
ALP LIVER SERPL-CCNC: 73 U/L
ALT SERPL-CCNC: 30 U/L
ANION GAP SERPL CALC-SCNC: 5 MMOL/L (ref 0–18)
ANTIBODY SCREEN: NEGATIVE
AST SERPL-CCNC: 25 U/L (ref 15–37)
BASOPHILS # BLD AUTO: 0.03 X10(3) UL (ref 0–0.2)
BASOPHILS NFR BLD AUTO: 0.7 %
BILIRUB SERPL-MCNC: 0.7 MG/DL (ref 0.1–2)
BILIRUB UR QL: NEGATIVE
BUN BLD-MCNC: 27 MG/DL (ref 7–18)
BUN/CREAT SERPL: 30.7 (ref 10–20)
CALCIUM BLD-MCNC: 9.7 MG/DL (ref 8.5–10.1)
CHLORIDE SERPL-SCNC: 109 MMOL/L (ref 98–112)
CHOLEST SMN-MCNC: 218 MG/DL (ref ?–200)
CLARITY UR: CLEAR
CO2 SERPL-SCNC: 27 MMOL/L (ref 21–32)
COLOR UR: YELLOW
CREAT BLD-MCNC: 0.88 MG/DL
DEPRECATED RDW RBC AUTO: 46.3 FL (ref 35.1–46.3)
EOSINOPHIL # BLD AUTO: 0.05 X10(3) UL (ref 0–0.7)
EOSINOPHIL NFR BLD AUTO: 1.2 %
ERYTHROCYTE [DISTWIDTH] IN BLOOD BY AUTOMATED COUNT: 12.7 % (ref 11–15)
GLOBULIN PLAS-MCNC: 3.5 G/DL (ref 2.8–4.4)
GLUCOSE BLD-MCNC: 99 MG/DL (ref 70–99)
GLUCOSE UR-MCNC: NEGATIVE MG/DL
HCT VFR BLD AUTO: 43.4 %
HDLC SERPL-MCNC: 115 MG/DL (ref 40–59)
HGB BLD-MCNC: 13.9 G/DL
HGB UR QL STRIP.AUTO: NEGATIVE
IMM GRANULOCYTES # BLD AUTO: 0.02 X10(3) UL (ref 0–1)
IMM GRANULOCYTES NFR BLD: 0.5 %
IRON SATURATION: 28 %
IRON SERPL-MCNC: 104 UG/DL
KETONES UR-MCNC: NEGATIVE MG/DL
LDLC SERPL CALC-MCNC: 94 MG/DL (ref ?–100)
LEUKOCYTE ESTERASE UR QL STRIP.AUTO: NEGATIVE
LYMPHOCYTES # BLD AUTO: 0.95 X10(3) UL (ref 1–4)
LYMPHOCYTES NFR BLD AUTO: 22.6 %
M PROTEIN MFR SERPL ELPH: 7.4 G/DL (ref 6.4–8.2)
MCH RBC QN AUTO: 31.4 PG (ref 26–34)
MCHC RBC AUTO-ENTMCNC: 32 G/DL (ref 31–37)
MCV RBC AUTO: 98.2 FL
MONOCYTES # BLD AUTO: 0.42 X10(3) UL (ref 0.1–1)
MONOCYTES NFR BLD AUTO: 10 %
NEUTROPHILS # BLD AUTO: 2.73 X10 (3) UL (ref 1.5–7.7)
NEUTROPHILS # BLD AUTO: 2.73 X10(3) UL (ref 1.5–7.7)
NEUTROPHILS NFR BLD AUTO: 65 %
NITRITE UR QL STRIP.AUTO: NEGATIVE
NONHDLC SERPL-MCNC: 103 MG/DL (ref ?–130)
OSMOLALITY SERPL CALC.SUM OF ELEC: 297 MOSM/KG (ref 275–295)
PATIENT FASTING Y/N/NP: NO
PATIENT FASTING Y/N/NP: NO
PH UR: 6 [PH] (ref 5–8)
PLATELET # BLD AUTO: 227 10(3)UL (ref 150–450)
POTASSIUM SERPL-SCNC: 4.3 MMOL/L (ref 3.5–5.1)
PROT UR-MCNC: NEGATIVE MG/DL
RBC # BLD AUTO: 4.42 X10(6)UL
RH BLOOD TYPE: NEGATIVE
SODIUM SERPL-SCNC: 141 MMOL/L (ref 136–145)
SP GR UR STRIP: 1.02 (ref 1–1.03)
TOTAL IRON BINDING CAPACITY: 378 UG/DL (ref 240–450)
TRANSFERRIN SERPL-MCNC: 254 MG/DL (ref 200–360)
TRIGL SERPL-MCNC: 48 MG/DL (ref 30–149)
UROBILINOGEN UR STRIP-ACNC: <2
VLDLC SERPL CALC-MCNC: 8 MG/DL (ref 0–30)
WBC # BLD AUTO: 4.2 X10(3) UL (ref 4–11)

## 2021-07-21 PROCEDURE — 80061 LIPID PANEL: CPT

## 2021-07-21 PROCEDURE — 87081 CULTURE SCREEN ONLY: CPT

## 2021-07-21 PROCEDURE — 86850 RBC ANTIBODY SCREEN: CPT

## 2021-07-21 PROCEDURE — 85025 COMPLETE CBC W/AUTO DIFF WBC: CPT

## 2021-07-21 PROCEDURE — 80053 COMPREHEN METABOLIC PANEL: CPT

## 2021-07-21 PROCEDURE — 86900 BLOOD TYPING SEROLOGIC ABO: CPT

## 2021-07-21 PROCEDURE — 93010 ELECTROCARDIOGRAM REPORT: CPT | Performed by: INTERNAL MEDICINE

## 2021-07-21 PROCEDURE — 83540 ASSAY OF IRON: CPT

## 2021-07-21 PROCEDURE — 71046 X-RAY EXAM CHEST 2 VIEWS: CPT | Performed by: INTERNAL MEDICINE

## 2021-07-21 PROCEDURE — 81001 URINALYSIS AUTO W/SCOPE: CPT

## 2021-07-21 PROCEDURE — 93005 ELECTROCARDIOGRAM TRACING: CPT

## 2021-07-21 PROCEDURE — 36415 COLL VENOUS BLD VENIPUNCTURE: CPT

## 2021-07-21 PROCEDURE — 86901 BLOOD TYPING SEROLOGIC RH(D): CPT

## 2021-07-21 PROCEDURE — 84466 ASSAY OF TRANSFERRIN: CPT

## 2021-07-23 ENCOUNTER — TELEPHONE (OUTPATIENT)
Dept: NEPHROLOGY | Facility: CLINIC | Age: 76
End: 2021-07-23

## 2021-07-23 NOTE — TELEPHONE ENCOUNTER
Send my last dictation, recent laboratory studies, EKG and chest x-ray to Dr. Natividad Sanchez. Patient is cleared for surgery.

## 2021-08-13 ENCOUNTER — APPOINTMENT (OUTPATIENT)
Dept: GENERAL RADIOLOGY | Facility: HOSPITAL | Age: 76
End: 2021-08-13
Attending: NURSE PRACTITIONER
Payer: MEDICARE

## 2021-08-13 ENCOUNTER — ANESTHESIA (OUTPATIENT)
Dept: SURGERY | Facility: HOSPITAL | Age: 76
End: 2021-08-13
Payer: MEDICARE

## 2021-08-13 ENCOUNTER — ANESTHESIA EVENT (OUTPATIENT)
Dept: SURGERY | Facility: HOSPITAL | Age: 76
End: 2021-08-13
Payer: MEDICARE

## 2021-08-13 ENCOUNTER — HOSPITAL ENCOUNTER (OUTPATIENT)
Facility: HOSPITAL | Age: 76
Discharge: HOME | End: 2021-08-15
Attending: ORTHOPAEDIC SURGERY | Admitting: ORTHOPAEDIC SURGERY
Payer: MEDICARE

## 2021-08-13 PROBLEM — I48.91 ATRIAL FIBRILLATION (HCC): Chronic | Status: ACTIVE | Noted: 2021-08-13

## 2021-08-13 PROBLEM — M17.11 PRIMARY OSTEOARTHRITIS OF RIGHT KNEE: Status: ACTIVE | Noted: 2021-08-13

## 2021-08-13 LAB
ANION GAP SERPL CALC-SCNC: 2 MMOL/L (ref 0–18)
BUN BLD-MCNC: 24 MG/DL (ref 7–18)
BUN/CREAT SERPL: 27 (ref 10–20)
CALCIUM BLD-MCNC: 9 MG/DL (ref 8.5–10.1)
CHLORIDE SERPL-SCNC: 111 MMOL/L (ref 98–112)
CO2 SERPL-SCNC: 28 MMOL/L (ref 21–32)
CREAT BLD-MCNC: 0.89 MG/DL
GLUCOSE BLD-MCNC: 95 MG/DL (ref 70–99)
OSMOLALITY SERPL CALC.SUM OF ELEC: 296 MOSM/KG (ref 275–295)
POTASSIUM SERPL-SCNC: 4.2 MMOL/L (ref 3.5–5.1)
SODIUM SERPL-SCNC: 141 MMOL/L (ref 136–145)

## 2021-08-13 PROCEDURE — 0SRC0J9 REPLACEMENT OF RIGHT KNEE JOINT WITH SYNTHETIC SUBSTITUTE, CEMENTED, OPEN APPROACH: ICD-10-PCS | Performed by: ORTHOPAEDIC SURGERY

## 2021-08-13 PROCEDURE — 73560 X-RAY EXAM OF KNEE 1 OR 2: CPT | Performed by: NURSE PRACTITIONER

## 2021-08-13 PROCEDURE — 99214 OFFICE O/P EST MOD 30 MIN: CPT | Performed by: HOSPITALIST

## 2021-08-13 DEVICE — BIOMET BC R 1X40 US: Type: IMPLANTABLE DEVICE | Site: KNEE | Status: FUNCTIONAL

## 2021-08-13 RX ORDER — BISACODYL 10 MG
10 SUPPOSITORY, RECTAL RECTAL
Status: DISCONTINUED | OUTPATIENT
Start: 2021-08-13 | End: 2021-08-15

## 2021-08-13 RX ORDER — MIDAZOLAM HYDROCHLORIDE 1 MG/ML
INJECTION INTRAMUSCULAR; INTRAVENOUS AS NEEDED
Status: DISCONTINUED | OUTPATIENT
Start: 2021-08-13 | End: 2021-08-13 | Stop reason: SURG

## 2021-08-13 RX ORDER — SODIUM CHLORIDE, SODIUM LACTATE, POTASSIUM CHLORIDE, CALCIUM CHLORIDE 600; 310; 30; 20 MG/100ML; MG/100ML; MG/100ML; MG/100ML
INJECTION, SOLUTION INTRAVENOUS CONTINUOUS
Status: DISCONTINUED | OUTPATIENT
Start: 2021-08-13 | End: 2021-08-13 | Stop reason: HOSPADM

## 2021-08-13 RX ORDER — ZOLPIDEM TARTRATE 5 MG/1
5 TABLET ORAL NIGHTLY PRN
Status: DISCONTINUED | OUTPATIENT
Start: 2021-08-13 | End: 2021-08-15

## 2021-08-13 RX ORDER — MORPHINE SULFATE 1 MG/ML
INJECTION, SOLUTION EPIDURAL; INTRATHECAL; INTRAVENOUS
Status: COMPLETED | OUTPATIENT
Start: 2021-08-13 | End: 2021-08-13

## 2021-08-13 RX ORDER — NALOXONE HYDROCHLORIDE 0.4 MG/ML
80 INJECTION, SOLUTION INTRAMUSCULAR; INTRAVENOUS; SUBCUTANEOUS AS NEEDED
Status: DISCONTINUED | OUTPATIENT
Start: 2021-08-13 | End: 2021-08-13 | Stop reason: HOSPADM

## 2021-08-13 RX ORDER — HYDROMORPHONE HYDROCHLORIDE 1 MG/ML
0.4 INJECTION, SOLUTION INTRAMUSCULAR; INTRAVENOUS; SUBCUTANEOUS EVERY 5 MIN PRN
Status: DISCONTINUED | OUTPATIENT
Start: 2021-08-13 | End: 2021-08-13 | Stop reason: HOSPADM

## 2021-08-13 RX ORDER — POLYETHYLENE GLYCOL 3350 17 G/17G
17 POWDER, FOR SOLUTION ORAL DAILY PRN
Status: DISCONTINUED | OUTPATIENT
Start: 2021-08-13 | End: 2021-08-15

## 2021-08-13 RX ORDER — HYDROCHLOROTHIAZIDE 25 MG/1
12.5 TABLET ORAL DAILY
Status: DISCONTINUED | OUTPATIENT
Start: 2021-08-14 | End: 2021-08-15

## 2021-08-13 RX ORDER — SENNOSIDES 8.6 MG
17.2 TABLET ORAL NIGHTLY
Status: DISCONTINUED | OUTPATIENT
Start: 2021-08-13 | End: 2021-08-15

## 2021-08-13 RX ORDER — CEFAZOLIN SODIUM/WATER 2 G/20 ML
2 SYRINGE (ML) INTRAVENOUS EVERY 8 HOURS
Status: COMPLETED | OUTPATIENT
Start: 2021-08-13 | End: 2021-08-14

## 2021-08-13 RX ORDER — DIPHENHYDRAMINE HCL 25 MG
25 CAPSULE ORAL EVERY 4 HOURS PRN
Status: DISCONTINUED | OUTPATIENT
Start: 2021-08-13 | End: 2021-08-15

## 2021-08-13 RX ORDER — ACETAMINOPHEN 500 MG
1000 TABLET ORAL ONCE
Status: COMPLETED | OUTPATIENT
Start: 2021-08-13 | End: 2021-08-13

## 2021-08-13 RX ORDER — DIPHENHYDRAMINE HYDROCHLORIDE 50 MG/ML
12.5 INJECTION INTRAMUSCULAR; INTRAVENOUS EVERY 4 HOURS PRN
Status: ACTIVE | OUTPATIENT
Start: 2021-08-13 | End: 2021-08-14

## 2021-08-13 RX ORDER — FAMOTIDINE 10 MG/ML
20 INJECTION, SOLUTION INTRAVENOUS 2 TIMES DAILY
Status: DISCONTINUED | OUTPATIENT
Start: 2021-08-13 | End: 2021-08-15

## 2021-08-13 RX ORDER — MORPHINE SULFATE 4 MG/ML
2 INJECTION, SOLUTION INTRAMUSCULAR; INTRAVENOUS EVERY 10 MIN PRN
Status: DISCONTINUED | OUTPATIENT
Start: 2021-08-13 | End: 2021-08-13 | Stop reason: HOSPADM

## 2021-08-13 RX ORDER — PROCHLORPERAZINE EDISYLATE 5 MG/ML
5 INJECTION INTRAMUSCULAR; INTRAVENOUS ONCE AS NEEDED
Status: ACTIVE | OUTPATIENT
Start: 2021-08-13 | End: 2021-08-13

## 2021-08-13 RX ORDER — HALOPERIDOL 5 MG/ML
0.25 INJECTION INTRAMUSCULAR ONCE AS NEEDED
Status: DISCONTINUED | OUTPATIENT
Start: 2021-08-13 | End: 2021-08-13 | Stop reason: HOSPADM

## 2021-08-13 RX ORDER — LIDOCAINE HYDROCHLORIDE 10 MG/ML
INJECTION, SOLUTION EPIDURAL; INFILTRATION; INTRACAUDAL; PERINEURAL AS NEEDED
Status: DISCONTINUED | OUTPATIENT
Start: 2021-08-13 | End: 2021-08-13 | Stop reason: SURG

## 2021-08-13 RX ORDER — HYDROMORPHONE HYDROCHLORIDE 1 MG/ML
0.6 INJECTION, SOLUTION INTRAMUSCULAR; INTRAVENOUS; SUBCUTANEOUS EVERY 5 MIN PRN
Status: DISCONTINUED | OUTPATIENT
Start: 2021-08-13 | End: 2021-08-13 | Stop reason: HOSPADM

## 2021-08-13 RX ORDER — BUPIVACAINE HYDROCHLORIDE 7.5 MG/ML
INJECTION, SOLUTION INTRASPINAL
Status: COMPLETED | OUTPATIENT
Start: 2021-08-13 | End: 2021-08-13

## 2021-08-13 RX ORDER — BUPIVACAINE HYDROCHLORIDE 2.5 MG/ML
INJECTION, SOLUTION EPIDURAL; INFILTRATION; INTRACAUDAL AS NEEDED
Status: DISCONTINUED | OUTPATIENT
Start: 2021-08-13 | End: 2021-08-13 | Stop reason: HOSPADM

## 2021-08-13 RX ORDER — PHENYLEPHRINE HCL 10 MG/ML
VIAL (ML) INJECTION AS NEEDED
Status: DISCONTINUED | OUTPATIENT
Start: 2021-08-13 | End: 2021-08-13 | Stop reason: SURG

## 2021-08-13 RX ORDER — HYDROCODONE BITARTRATE AND ACETAMINOPHEN 7.5; 325 MG/1; MG/1
1 TABLET ORAL EVERY 6 HOURS PRN
Status: ACTIVE | OUTPATIENT
Start: 2021-08-13 | End: 2021-08-14

## 2021-08-13 RX ORDER — MORPHINE SULFATE 10 MG/ML
6 INJECTION, SOLUTION INTRAMUSCULAR; INTRAVENOUS EVERY 10 MIN PRN
Status: DISCONTINUED | OUTPATIENT
Start: 2021-08-13 | End: 2021-08-13 | Stop reason: HOSPADM

## 2021-08-13 RX ORDER — ONDANSETRON 2 MG/ML
4 INJECTION INTRAMUSCULAR; INTRAVENOUS EVERY 4 HOURS PRN
Status: DISCONTINUED | OUTPATIENT
Start: 2021-08-13 | End: 2021-08-15

## 2021-08-13 RX ORDER — HYDROMORPHONE HYDROCHLORIDE 1 MG/ML
0.4 INJECTION, SOLUTION INTRAMUSCULAR; INTRAVENOUS; SUBCUTANEOUS
Status: ACTIVE | OUTPATIENT
Start: 2021-08-13 | End: 2021-08-14

## 2021-08-13 RX ORDER — ONDANSETRON 2 MG/ML
4 INJECTION INTRAMUSCULAR; INTRAVENOUS ONCE AS NEEDED
Status: DISCONTINUED | OUTPATIENT
Start: 2021-08-13 | End: 2021-08-13 | Stop reason: HOSPADM

## 2021-08-13 RX ORDER — EPHEDRINE SULFATE 50 MG/ML
INJECTION INTRAVENOUS AS NEEDED
Status: DISCONTINUED | OUTPATIENT
Start: 2021-08-13 | End: 2021-08-13 | Stop reason: SURG

## 2021-08-13 RX ORDER — CELECOXIB 200 MG/1
200 CAPSULE ORAL ONCE
Status: COMPLETED | OUTPATIENT
Start: 2021-08-13 | End: 2021-08-13

## 2021-08-13 RX ORDER — CEFAZOLIN SODIUM/WATER 2 G/20 ML
2 SYRINGE (ML) INTRAVENOUS ONCE
Status: COMPLETED | OUTPATIENT
Start: 2021-08-13 | End: 2021-08-13

## 2021-08-13 RX ORDER — HYDROMORPHONE HYDROCHLORIDE 1 MG/ML
0.2 INJECTION, SOLUTION INTRAMUSCULAR; INTRAVENOUS; SUBCUTANEOUS EVERY 5 MIN PRN
Status: DISCONTINUED | OUTPATIENT
Start: 2021-08-13 | End: 2021-08-13 | Stop reason: HOSPADM

## 2021-08-13 RX ORDER — MAGNESIUM HYDROXIDE 1200 MG/15ML
LIQUID ORAL CONTINUOUS PRN
Status: COMPLETED | OUTPATIENT
Start: 2021-08-13 | End: 2021-08-13

## 2021-08-13 RX ORDER — SODIUM PHOSPHATE, DIBASIC AND SODIUM PHOSPHATE, MONOBASIC 7; 19 G/133ML; G/133ML
1 ENEMA RECTAL ONCE AS NEEDED
Status: DISCONTINUED | OUTPATIENT
Start: 2021-08-13 | End: 2021-08-15

## 2021-08-13 RX ORDER — SODIUM CHLORIDE, SODIUM LACTATE, POTASSIUM CHLORIDE, CALCIUM CHLORIDE 600; 310; 30; 20 MG/100ML; MG/100ML; MG/100ML; MG/100ML
INJECTION, SOLUTION INTRAVENOUS CONTINUOUS
Status: DISCONTINUED | OUTPATIENT
Start: 2021-08-13 | End: 2021-08-15

## 2021-08-13 RX ORDER — HYDROCODONE BITARTRATE AND ACETAMINOPHEN 5; 325 MG/1; MG/1
1 TABLET ORAL EVERY 4 HOURS PRN
Status: DISCONTINUED | OUTPATIENT
Start: 2021-08-13 | End: 2021-08-15

## 2021-08-13 RX ORDER — MORPHINE SULFATE 4 MG/ML
4 INJECTION, SOLUTION INTRAMUSCULAR; INTRAVENOUS EVERY 10 MIN PRN
Status: DISCONTINUED | OUTPATIENT
Start: 2021-08-13 | End: 2021-08-13 | Stop reason: HOSPADM

## 2021-08-13 RX ORDER — DIPHENHYDRAMINE HCL 25 MG
25 CAPSULE ORAL EVERY 4 HOURS PRN
Status: ACTIVE | OUTPATIENT
Start: 2021-08-13 | End: 2021-08-14

## 2021-08-13 RX ORDER — HYDROCODONE BITARTRATE AND ACETAMINOPHEN 10; 325 MG/1; MG/1
2 TABLET ORAL EVERY 4 HOURS PRN
Status: DISCONTINUED | OUTPATIENT
Start: 2021-08-13 | End: 2021-08-15

## 2021-08-13 RX ORDER — CYCLOBENZAPRINE HCL 10 MG
10 TABLET ORAL EVERY 8 HOURS PRN
Status: DISCONTINUED | OUTPATIENT
Start: 2021-08-13 | End: 2021-08-15

## 2021-08-13 RX ORDER — KETOROLAC TROMETHAMINE 30 MG/ML
30 INJECTION, SOLUTION INTRAMUSCULAR; INTRAVENOUS ONCE
Status: COMPLETED | OUTPATIENT
Start: 2021-08-13 | End: 2021-08-13

## 2021-08-13 RX ORDER — FAMOTIDINE 20 MG/1
20 TABLET ORAL 2 TIMES DAILY
Status: DISCONTINUED | OUTPATIENT
Start: 2021-08-13 | End: 2021-08-15

## 2021-08-13 RX ORDER — PROCHLORPERAZINE EDISYLATE 5 MG/ML
10 INJECTION INTRAMUSCULAR; INTRAVENOUS EVERY 6 HOURS PRN
Status: ACTIVE | OUTPATIENT
Start: 2021-08-13 | End: 2021-08-15

## 2021-08-13 RX ORDER — ONDANSETRON 2 MG/ML
4 INJECTION INTRAMUSCULAR; INTRAVENOUS ONCE AS NEEDED
Status: ACTIVE | OUTPATIENT
Start: 2021-08-13 | End: 2021-08-13

## 2021-08-13 RX ORDER — LOSARTAN POTASSIUM 100 MG/1
100 TABLET ORAL DAILY
Status: DISCONTINUED | OUTPATIENT
Start: 2021-08-14 | End: 2021-08-15

## 2021-08-13 RX ORDER — HALOPERIDOL 5 MG/ML
0.5 INJECTION INTRAMUSCULAR ONCE AS NEEDED
Status: ACTIVE | OUTPATIENT
Start: 2021-08-13 | End: 2021-08-13

## 2021-08-13 RX ORDER — METOPROLOL TARTRATE 5 MG/5ML
INJECTION INTRAVENOUS AS NEEDED
Status: DISCONTINUED | OUTPATIENT
Start: 2021-08-13 | End: 2021-08-13 | Stop reason: SURG

## 2021-08-13 RX ORDER — NALOXONE HYDROCHLORIDE 0.4 MG/ML
0.08 INJECTION, SOLUTION INTRAMUSCULAR; INTRAVENOUS; SUBCUTANEOUS
Status: ACTIVE | OUTPATIENT
Start: 2021-08-13 | End: 2021-08-14

## 2021-08-13 RX ORDER — DIPHENHYDRAMINE HYDROCHLORIDE 50 MG/ML
12.5 INJECTION INTRAMUSCULAR; INTRAVENOUS EVERY 4 HOURS PRN
Status: DISCONTINUED | OUTPATIENT
Start: 2021-08-13 | End: 2021-08-15

## 2021-08-13 RX ORDER — HYDROCODONE BITARTRATE AND ACETAMINOPHEN 5; 325 MG/1; MG/1
2 TABLET ORAL AS NEEDED
Status: DISCONTINUED | OUTPATIENT
Start: 2021-08-13 | End: 2021-08-13 | Stop reason: HOSPADM

## 2021-08-13 RX ORDER — HYDROCODONE BITARTRATE AND ACETAMINOPHEN 7.5; 325 MG/1; MG/1
2 TABLET ORAL EVERY 6 HOURS PRN
Status: DISPENSED | OUTPATIENT
Start: 2021-08-13 | End: 2021-08-14

## 2021-08-13 RX ORDER — ACETAMINOPHEN 325 MG/1
650 TABLET ORAL EVERY 6 HOURS PRN
Status: ACTIVE | OUTPATIENT
Start: 2021-08-13 | End: 2021-08-14

## 2021-08-13 RX ORDER — SODIUM CHLORIDE 9 MG/ML
INJECTION, SOLUTION INTRAVENOUS CONTINUOUS
Status: DISCONTINUED | OUTPATIENT
Start: 2021-08-13 | End: 2021-08-15

## 2021-08-13 RX ORDER — TRAMADOL HYDROCHLORIDE 50 MG/1
50 TABLET ORAL EVERY 6 HOURS
Status: DISCONTINUED | OUTPATIENT
Start: 2021-08-13 | End: 2021-08-15

## 2021-08-13 RX ORDER — AMIODARONE HYDROCHLORIDE 50 MG/ML
150 INJECTION, SOLUTION INTRAVENOUS ONCE
Status: DISCONTINUED | OUTPATIENT
Start: 2021-08-13 | End: 2021-08-13

## 2021-08-13 RX ORDER — HYDROCODONE BITARTRATE AND ACETAMINOPHEN 5; 325 MG/1; MG/1
1 TABLET ORAL AS NEEDED
Status: DISCONTINUED | OUTPATIENT
Start: 2021-08-13 | End: 2021-08-13 | Stop reason: HOSPADM

## 2021-08-13 RX ORDER — DOCUSATE SODIUM 100 MG/1
100 CAPSULE, LIQUID FILLED ORAL 2 TIMES DAILY
Status: DISCONTINUED | OUTPATIENT
Start: 2021-08-13 | End: 2021-08-15

## 2021-08-13 RX ORDER — AMIODARONE HYDROCHLORIDE 200 MG/1
200 TABLET ORAL 3 TIMES DAILY
Status: DISCONTINUED | OUTPATIENT
Start: 2021-08-13 | End: 2021-08-15

## 2021-08-13 RX ORDER — LIDOCAINE HYDROCHLORIDE 10 MG/ML
INJECTION, SOLUTION INFILTRATION; PERINEURAL
Status: COMPLETED | OUTPATIENT
Start: 2021-08-13 | End: 2021-08-13

## 2021-08-13 RX ORDER — PROCHLORPERAZINE EDISYLATE 5 MG/ML
5 INJECTION INTRAMUSCULAR; INTRAVENOUS ONCE AS NEEDED
Status: DISCONTINUED | OUTPATIENT
Start: 2021-08-13 | End: 2021-08-13 | Stop reason: HOSPADM

## 2021-08-13 RX ORDER — HYDROMORPHONE HYDROCHLORIDE 1 MG/ML
0.6 INJECTION, SOLUTION INTRAMUSCULAR; INTRAVENOUS; SUBCUTANEOUS
Status: ACTIVE | OUTPATIENT
Start: 2021-08-13 | End: 2021-08-14

## 2021-08-13 RX ORDER — NALBUPHINE HCL 10 MG/ML
2.5 AMPUL (ML) INJECTION EVERY 4 HOURS PRN
Status: ACTIVE | OUTPATIENT
Start: 2021-08-13 | End: 2021-08-14

## 2021-08-13 RX ORDER — HYDROCODONE BITARTRATE AND ACETAMINOPHEN 10; 325 MG/1; MG/1
1 TABLET ORAL EVERY 4 HOURS PRN
Status: DISCONTINUED | OUTPATIENT
Start: 2021-08-13 | End: 2021-08-15

## 2021-08-13 RX ADMIN — CEFAZOLIN SODIUM/WATER 2 G: 2 G/20 ML SYRINGE (ML) INTRAVENOUS at 07:27:00

## 2021-08-13 RX ADMIN — PHENYLEPHRINE HCL 100 MCG: 10 MG/ML VIAL (ML) INJECTION at 08:23:00

## 2021-08-13 RX ADMIN — EPHEDRINE SULFATE 10 MG: 50 INJECTION INTRAVENOUS at 08:06:00

## 2021-08-13 RX ADMIN — BUPIVACAINE HYDROCHLORIDE 1.6 ML: 7.5 INJECTION, SOLUTION INTRASPINAL at 07:23:00

## 2021-08-13 RX ADMIN — LIDOCAINE HYDROCHLORIDE 3 ML: 10 INJECTION, SOLUTION INFILTRATION; PERINEURAL at 07:23:00

## 2021-08-13 RX ADMIN — SODIUM CHLORIDE, SODIUM LACTATE, POTASSIUM CHLORIDE, CALCIUM CHLORIDE: 600; 310; 30; 20 INJECTION, SOLUTION INTRAVENOUS at 08:51:00

## 2021-08-13 RX ADMIN — MORPHINE SULFATE 0.2 MG: 1 INJECTION, SOLUTION EPIDURAL; INTRATHECAL; INTRAVENOUS at 07:23:00

## 2021-08-13 RX ADMIN — LIDOCAINE HYDROCHLORIDE 25 MG: 10 INJECTION, SOLUTION EPIDURAL; INFILTRATION; INTRACAUDAL; PERINEURAL at 07:30:00

## 2021-08-13 RX ADMIN — SODIUM CHLORIDE, SODIUM LACTATE, POTASSIUM CHLORIDE, CALCIUM CHLORIDE: 600; 310; 30; 20 INJECTION, SOLUTION INTRAVENOUS at 07:15:00

## 2021-08-13 RX ADMIN — MIDAZOLAM HYDROCHLORIDE 2 MG: 1 INJECTION INTRAMUSCULAR; INTRAVENOUS at 07:18:00

## 2021-08-13 RX ADMIN — METOPROLOL TARTRATE 2.5 MG: 5 INJECTION INTRAVENOUS at 09:01:00

## 2021-08-13 RX ADMIN — METOPROLOL TARTRATE 2.5 MG: 5 INJECTION INTRAVENOUS at 09:16:00

## 2021-08-13 NOTE — ANESTHESIA POSTPROCEDURE EVALUATION
Patient: Peggy Gan    Procedure Summary     Date: 08/13/21 Room / Location: [address] [place] [address] MAIN OR 06 / [address] [place] [address] MAIN OR    Anesthesia Start: 0715 Anesthesia Stop: 8452    Procedure: right total knee arthroplasty (Right Knee) Diagnosis: (degenerative joint disease

## 2021-08-13 NOTE — CM/SW NOTE
08/13/21 1500   CM/SW Referral Data   Referral Source Physician   Reason for Referral Discharge planning   Informant EMR   Pertinent Medical Hx   Does patient have an established PCP?  Yes  Hernan Velasco MD)   Patient Info   Patient's Current Mental

## 2021-08-13 NOTE — OPERATIVE REPORT
[address]  [address] [place] [address] Mai PATEL    OPERATIVE REPORT    PATIENT NAME: Areli Duron  MR#: [id number]    DATE OF PROCEDURE: 8/13/2021  PREOPERATIVE DIAGNOSIS: Degenerative Arthritis (e.g., OA) right knee  POSTOPE and radiographic findings. We reviewed the risks, benefits and alternatives to the procedure and informed consent was obtained.  The risks discussed included, but were not limited, to infection, nerve injury, arterial injury, bleeding, deep venous thrombosi patellar fat pad. We completed our medial release by dissecting from the midline around to the posteriomedial corner in a subperiosteal fashion along the tibia and removed bony osteophytes off the distal femur and proximal tibia at this time.  Once this was block into these drill holes. This block was then centered over the distal end of the femur. We then made our anterior distal femoral resection and removed the bony block.  It was noted that we had an excellent grand piano sign on the distal femur signifyin removed and the knee was taken though a full range of motion. Excellent range of motion and good stability was noted, throughout the entire arc of motion with the 11mm trial insert in place.  Therefore, the wound was irrigated with pulsatile lavage and we i positioning  - Closure of the various layers of soft tissue and skin  - Insertion of pins and holding screws of guides  - Dressing placement  - Transfer of patient to the stretcher and transport to the recovery room  Katelyn Martinez was a critical part

## 2021-08-13 NOTE — PROGRESS NOTES
[facility]    Progress Note    Nathaliaglen De Jesus Patient Status:  Outpatient in a Bed    1945 MRN [id number]   Location [address] [facility] Attending Renee Cotto MD   Hosp Day # 0 PCP Juan Guadarrama MD 0.88 07/21/2021    BUN 27 (H) 07/21/2021     07/21/2021    K 4.3 07/21/2021     07/21/2021    CO2 27.0 07/21/2021    GLU 99 07/21/2021    CA 9.7 07/21/2021    ALB 3.9 07/21/2021    ALKPHO 73 07/21/2021    BILT 0.7 07/21/2021    TP 7.4 07/21/202

## 2021-08-13 NOTE — PROGRESS NOTES
Patient still in atrial fibrillation. Had lunch earlier today. 150 mg IV bolus of amiodarone given start 200 mg 3 times daily oral amiodarone if still in atrial fibrillation in the next 24-48 hours then start anticoagulation with Xarelto 20 mg daily.

## 2021-08-13 NOTE — ANESTHESIA PREPROCEDURE EVALUATION
Anesthesia PreOp Note    HPI:     Mary West is a 78year old male who presents for preoperative consultation requested by: Heavenly Pagan MD    Date of Surgery: 8/13/2021    Procedure(s):  right total knee arthroplasty  Indication: degenerative ronda Left endoscopic carpal tunnel release       Vitamin D3, Cholecalciferol, (VITAMIN D3) 125 MCG (5000 UT) Oral Cap, Take 5,000 Units by mouth daily.  Pt takes 5000 units every other day, Disp: , Rfl: , 8/3/2021  Probiotic Product (PROBIOTIC-10 OR), Take by mo Cancer Father         leukemia   • Diabetes Neg    • Glaucoma Neg      Social History    Socioeconomic History      Marital status:       Spouse name: Not on file      Number of children: Not on file      Years of education: Not on file      Highest (Medical):       Lack of Transportation (Non-Medical):   Physical Activity:       Days of Exercise per Week:       Minutes of Exercise per Session:   Stress:       Feeling of Stress :   Social Connections:       Frequency of Communication with Friends and GI/Hepatic/Renal - negative ROS     Endo/Other - negative ROS   Abdominal              Anesthesia Plan:   ASA:  2  Plan:   Spinal  Post-op Pain Management: Intrathecal narcotics  Plan Comments: I have informed Loni Husbands of the risks of spinal an

## 2021-08-13 NOTE — BRIEF OP NOTE
[facility]    TKA Brief Operative Note    Sheryl Zafar Patient Status:  Outpatient in a Bed    1945 MRN [id number]   Location [address] [facility] Attending Kiesha Agrawal MD     PCP Marino Smith MD       Preop

## 2021-08-13 NOTE — H&P
History & Physical Examination    Patient Name: Hoa Foster  MRN: [id number]  [facility]: [id number]  YOB: 1945    Diagnosis: DJD R knee    Present Illness: Hoa Foster is a 78year old yo male with a history of R knee end stage DJD.   Clear Once        Allergies: Patient has no known allergies.     Past Medical History:   Diagnosis Date   • Actinic keratosis    • Acute carpal tunnel syndrome of left wrist 06/04/2018   • BCC (basal cell carcinoma of skin)    • Calculus of kidney    • Cancer (HC

## 2021-08-13 NOTE — CONSULTS
[address] CONSULT NOTE    Kimberlee matt Patient Status:  Outpatient in a Bed    1945 MRN [id number]   Location [address] [facility] Attending Agusto Hernandes MD   Hosp Day # 0 PCP FRAN Mukherjee 06/04/2018   • BCC (basal cell carcinoma of skin)    • Calculus of kidney    • Cancer (HCC)    • High blood pressure    • High cholesterol    • Osteoarthritis    • Skin cancer    • Visual impairment     glasses       Past Surgical History  Past Surgical Hi mcg, Intravenous, Q5 Min PRN  fentaNYL citrate (SUBLIMAZE) 0.05 MG/ML injection 50 mcg, 50 mcg, Intravenous, Q5 Min PRN  HYDROmorphone HCl (DILAUDID) 1 MG/ML injection 0.2 mg, 0.2 mg, Intravenous, Q5 Min PRN  HYDROmorphone HCl (DILAUDID) 1 MG/ML injection MG Oral Cap, Take 1 capsule by mouth daily.           Allergies  No Known Allergies    Review of Systems:   10 pt ROS performed, separate from HPI  Review of Systems:  GENERAL: no fevers, chills, sweats  HEENT: no visual or hearing changes  SKIN: denies any 1.23 08/17/2012    PSA 1.6 10/15/2018    ESRML 11 09/12/2018    CRP 0.6 09/12/2018     09/12/2018    B12 485 05/02/2018         Thank you for allowing me to participate in the care of your patient.     Liberty Magaña MD  [place] [facility]

## 2021-08-13 NOTE — PLAN OF CARE
Problem: PAIN - ADULT  Goal: Verbalizes/displays adequate comfort level or patient's stated pain goal  Description: INTERVENTIONS:  - Encourage pt to monitor pain and request assistance  - Assess pain using appropriate pain scale  - Administer analgesics patient's ability to be responsible for managing their own health  - Refer to Case Management Department for coordinating discharge planning if the patient needs post-hospital services based on physician/LIP order or complex needs related to functional sta assistive device and precautions (e.g. spinal or hip dislocation precautions)  Outcome: Progressing     Problem: Impaired Functional Mobility  Goal: Achieve highest/safest level of mobility/gait  Description: Interventions:  - Assess patient's functional a

## 2021-08-13 NOTE — ANESTHESIA PROCEDURE NOTES
Spinal Block    Date/Time: 8/13/2021 7:23 AM  Performed by: Noe Barbosa MD  Authorized by: Noe Barbosa MD       General Information and Staff    Start Time:  8/13/2021 7:19 AM  End Time:  8/13/2021 5:23 PM  Anesthesiologist:  Mylene Chatman

## 2021-08-14 LAB
DEPRECATED RDW RBC AUTO: 45.6 FL (ref 35.1–46.3)
ERYTHROCYTE [DISTWIDTH] IN BLOOD BY AUTOMATED COUNT: 12.5 % (ref 11–15)
HCT VFR BLD AUTO: 33.9 %
HGB BLD-MCNC: 11.1 G/DL
MCH RBC QN AUTO: 32.4 PG (ref 26–34)
MCHC RBC AUTO-ENTMCNC: 32.7 G/DL (ref 31–37)
MCV RBC AUTO: 98.8 FL
PLATELET # BLD AUTO: 160 10(3)UL (ref 150–450)
RBC # BLD AUTO: 3.43 X10(6)UL
WBC # BLD AUTO: 6.5 X10(3) UL (ref 4–11)

## 2021-08-14 PROCEDURE — 99214 OFFICE O/P EST MOD 30 MIN: CPT | Performed by: HOSPITALIST

## 2021-08-14 RX ORDER — TRAMADOL HYDROCHLORIDE 50 MG/1
50 TABLET ORAL EVERY 6 HOURS PRN
Status: DISCONTINUED | OUTPATIENT
Start: 2021-08-14 | End: 2021-08-15

## 2021-08-14 NOTE — PROGRESS NOTES
[facility]  Progress Note     Bill Bhatia  : 1945    Status: Outpatient in a Bed  Day #: 0    Attending: Suzanna Ramirez MD  PCP: Mickie Bah MD      Assessment and Plan     Primary OA R knee  -s/p L knee total arthroplasty  -pain 07/21/2021 10:09:27 Atrial fibrillation has replaced sinus rhythm asmall anterior q waves are ow seen Electronically signed on 08/13/2021 at 16:04 by CADEN Sun     Medications     • [START ON 8/15/2021] rivaroxaban  20 mg Oral Q24H   • Senna  17.2 mg Or

## 2021-08-14 NOTE — PHYSICAL THERAPY NOTE
PHYSICAL THERAPY KNEE TREATMENT NOTE - INPATIENT     Room Number: 145/588-B             Presenting Problem: s/p R TKA    Problem List  Principal Problem:    Primary osteoarthritis of right knee  Active Problems:    Essential hypertension    Hypercholestero over in bed (including adjusting bedclothes, sheets and blankets)?: None   -   Sitting down on and standing up from a chair with arms (e.g., wheelchair, bedside commode, etc.): A Little   -   Moving from lying on back to sitting on the side of the bed?: A extension to 95 degrees flexion     Goal #5   Current Status In progress   Goal #6 Patient independently performs home exercise program for ROM/strengthening per the instructions provided in preparation for discharge.    Goal #6  Current Status In progress

## 2021-08-14 NOTE — PROGRESS NOTES
[facility]    Progress Note    Anuja Blackburn Patient Status:  Outpatient in a Bed    1945 MRN [id number]   Location [facility] 4W/SW/SE Attending Yanni Murray MD   Hosp Day # 0 PCP Emanuel Garcia MD       Subjective:   Brii Angulo 7.4 07/21/2021    AST 25 07/21/2021    ALT 30 07/21/2021    TSH 1.23 08/17/2012    PSA 1.6 10/15/2018    ESRML 11 09/12/2018    CRP 0.6 09/12/2018     09/12/2018    B12 485 05/02/2018       XR KNEE (1 OR 2 VIEWS), RIGHT (CPT=73560)    Result Date: 8/

## 2021-08-14 NOTE — PROGRESS NOTES
[facility]     Cardiology Progress Note    Subjective:  No chest pain or shortness of breath.     Objective:  /65 (BP Location: Right arm)   Pulse 100   Temp 98.6 °F (37 °C) (Oral)   Resp 18   Ht 6' (1.829 m)   Wt 189 lb 4.8 oz (85.9 k

## 2021-08-14 NOTE — OCCUPATIONAL THERAPY NOTE
OCCUPATIONAL THERAPY EVALUATION - INPATIENT      Room Number: 404/404-A  Evaluation Date: 8/14/2021  Type of Evaluation: Initial  Presenting Problem:  (R TKA)    Physician Order: IP Consult to Occupational Therapy  Reason for Therapy: ADL/IADL Dysfunction impairment     glasses       Past Surgical History  Past Surgical History:   Procedure Laterality Date   • CARPAL TUNNEL RELEASE     • COLONOSCOPY     • OTHER      squamous cell removal on the leg   • OTHER Left     has retained needle from stepping on nee functional limits    ACTIVITIES OF DAILY LIVING ASSESSMENT  AM-PAC ‘6-Clicks’ Inpatient Daily Activity Short Form  How much help from another person does the patient currently need…  -   Putting on and taking off regular lower body clothing?: A Little  -

## 2021-08-14 NOTE — PLAN OF CARE
Patient is alert and oriented x4. Patient [facility] is new onset afib, fluctuates greatly from 160's down to 40 BPM today. Dr. Joce Ramos and cardiology aware. Patient had vasovagal episode today, passed out on the toilet, vomited and had bm during that episode.  Returned interventions  8/14/2021 1535 by Reyes Aldo, RN  Outcome: Progressing  8/14/2021 1535 by Reyes Aldo, RN  Outcome: Progressing     Problem: PAIN - ADULT  Goal: Verbalizes/displays adequate comfort level or patient's stated pain goal  Description: discharge planning  - Arrange for needed discharge resources and transportation as appropriate  - Identify discharge learning needs (meds, wound care, etc)  - Arrange for interpreters to assist at discharge as needed  - Consider post-discharge preferences activity tolerance for standing, transferring and ambulating w/ or w/o assistive devices  - Assist with transfers and ambulation using safe patient handling equipment as needed  - Ensure adequate protection for wounds/incisions during mobilization  - Davie Tate

## 2021-08-15 VITALS
HEART RATE: 106 BPM | SYSTOLIC BLOOD PRESSURE: 129 MMHG | HEIGHT: 72 IN | RESPIRATION RATE: 18 BRPM | DIASTOLIC BLOOD PRESSURE: 91 MMHG | BODY MASS INDEX: 25.64 KG/M2 | OXYGEN SATURATION: 98 % | WEIGHT: 189.31 LBS | TEMPERATURE: 99 F

## 2021-08-15 LAB
DEPRECATED RDW RBC AUTO: 45.3 FL (ref 35.1–46.3)
ERYTHROCYTE [DISTWIDTH] IN BLOOD BY AUTOMATED COUNT: 12.5 % (ref 11–15)
HCT VFR BLD AUTO: 29.5 %
HGB BLD-MCNC: 9.8 G/DL
MCH RBC QN AUTO: 32.6 PG (ref 26–34)
MCHC RBC AUTO-ENTMCNC: 33.2 G/DL (ref 31–37)
MCV RBC AUTO: 98 FL
PLATELET # BLD AUTO: 148 10(3)UL (ref 150–450)
RBC # BLD AUTO: 3.01 X10(6)UL
WBC # BLD AUTO: 7 X10(3) UL (ref 4–11)

## 2021-08-15 PROCEDURE — 99215 OFFICE O/P EST HI 40 MIN: CPT | Performed by: HOSPITALIST

## 2021-08-15 RX ORDER — AMIODARONE HYDROCHLORIDE 200 MG/1
200 TABLET ORAL 3 TIMES DAILY
Qty: 90 TABLET | Refills: 0 | Status: SHIPPED | OUTPATIENT
Start: 2021-08-15 | End: 2021-12-08 | Stop reason: ALTCHOICE

## 2021-08-15 NOTE — PROGRESS NOTES
[facility]    Progress Note    Lenoard Senior Patient Status:  Outpatient in a Bed    1945 MRN [id number]   Location [facility] 4W/SW/SE Attending Gi Thompson MD   Hosp Day # 0 PCP Tiffanie Vergara MD       Subjective:   Vik Gregory 07/21/2021    AST 25 07/21/2021    ALT 30 07/21/2021    TSH 1.23 08/17/2012    PSA 1.6 10/15/2018    ESRML 11 09/12/2018    CRP 0.6 09/12/2018     09/12/2018    B12 485 05/02/2018       No results found.   EKG 12 Lead    Result Date: 8/14/2021  ECG Re

## 2021-08-15 NOTE — PLAN OF CARE
Pt is POD #1-2. Vital signs within normal limits. PRN Norco and scheduled Tramadol provided for pain. Alert and oriented x4. CMS intact. Tele #70 in place; a-fib. Rate controlled at this time, will continue to monitor. On room air. Tolerating general diet. INTERVENTIONS:  - Assess pt frequently for physical needs  - Identify cognitive and physical deficits and behaviors that affect risk of falls.   - [place] fall precautions as indicated by assessment.  - Educate pt/family on patient safety including physic factors for pressure ulcer development  - Assess and document skin integrity  - Assess and document dressing/incision, wound bed, drain sites and surrounding tissue  - Implement wound care per orders  - Initiate isolation precautions as appropriate  - Init

## 2021-08-15 NOTE — DISCHARGE SUMMARY
[facility]  Discharge Summary     Angy Fraser  : 1945    Status: Outpatient in a Bed  Day #: 0    Attending: Janell Wolff MD  PCP: Melony Amos MD     Date of Admission:  2021  Date of Discharge:  8/15/2021     Hospital Dis medications      Instructions Prescription details   atorvastatin 40 MG Tabs  Commonly known as: LIPITOR      Take 1.5 tablets (60 mg total) by mouth nightly.    Quantity: 135 tablet  Refills: 1     [facility] Fish Oil 1200 MG Caps      Take 1 capsule by mouth isa

## 2021-08-15 NOTE — PROGRESS NOTES
[facility]     Cardiology Progress Note    Subjective:  No chest pain or shortness of breath.     Objective:  BP (!) 129/91 (BP Location: Right arm)   Pulse 106   Temp 98.7 °F (37.1 °C) (Oral)   Resp 18   Ht 72\"   Wt 189 lb 4.8 oz (85.9 kg)

## 2021-08-15 NOTE — PHYSICAL THERAPY NOTE
PHYSICAL THERAPY KNEE TREATMENT NOTE - INPATIENT     Room Number: 006/643-M             Presenting Problem: s/p R TKA    Problem List  Principal Problem:    Primary osteoarthritis of right knee  Active Problems:    Essential hypertension    Hypercholestero Fair    ACTIVITY TOLERANCE                         O2 WALK       AM-PAC '6-Clicks' INPATIENT SHORT FORM - BASIC MOBILITY  How much difficulty does the patient currently have. ..  -   Turning over in bed (including adjusting bedclothes, sheets and blankets)? assistive device and supervision   Goal #4   Current Status 12 stairs with 1 HR CGA   Goal #5 R Knee AROM 0 degrees extension to 95 degrees flexion     Goal #5   Current Status IN PROGRESS   Goal #6 Patient independently performs home exercise program for

## 2021-08-19 ENCOUNTER — PATIENT OUTREACH (OUTPATIENT)
Dept: CASE MANAGEMENT | Age: 76
End: 2021-08-19

## 2021-08-19 NOTE — PROGRESS NOTES
Patient LVM requesting assistance scheduling apt     [facility] [address]  [facility] [address]  [address] [facility] [address]  [phone]  Echocardiogram apt made for Thurs. Sept. 2nd @3pm  Apt made with [employer] for Thurs. Sept. 9th @11:15am    Patient not

## 2021-08-23 RX ORDER — LOSARTAN POTASSIUM 100 MG/1
100 TABLET ORAL DAILY
Qty: 30 TABLET | Refills: 1 | Status: SHIPPED | OUTPATIENT
Start: 2021-08-23 | End: 2021-12-03

## 2021-08-23 RX ORDER — HYDROCHLOROTHIAZIDE 12.5 MG/1
12.5 TABLET ORAL DAILY
Qty: 90 TABLET | Refills: 1 | Status: SHIPPED | OUTPATIENT
Start: 2021-08-23 | End: 2022-03-18

## 2021-08-24 RX ORDER — ATORVASTATIN CALCIUM 40 MG/1
60 TABLET, FILM COATED ORAL NIGHTLY
Qty: 135 TABLET | Refills: 1 | Status: SHIPPED | OUTPATIENT
Start: 2021-08-24

## 2021-08-24 NOTE — TELEPHONE ENCOUNTER
Last seen 7/13/21. Last lipid panel was done on 7/21/21. Refill(s) pended and routed to Dr. Wily Majano.

## 2021-08-24 NOTE — TELEPHONE ENCOUNTER
Pt called to request 3mos supply for [facility] RX:       Current Outpatient Medications:   •  atorvastatin 40 MG Oral Tab, Take 1.5 tablets (60 mg total) by mouth nightly., Disp: 135 tablet, Rfl: 1  ke 1 capsule by mouth daily.   , Disp: , Rfl:     Pt is ronit

## 2021-09-20 ENCOUNTER — OFFICE VISIT (OUTPATIENT)
Dept: OPTOMETRY | Facility: CLINIC | Age: 76
End: 2021-09-20
Payer: MEDICARE

## 2021-09-20 DIAGNOSIS — H52.13 MYOPIA, BILATERAL: ICD-10-CM

## 2021-09-20 DIAGNOSIS — H25.13 AGE-RELATED NUCLEAR CATARACT OF BOTH EYES: Primary | ICD-10-CM

## 2021-09-20 PROCEDURE — 92015 DETERMINE REFRACTIVE STATE: CPT | Performed by: OPTOMETRIST

## 2021-09-20 PROCEDURE — 92014 COMPRE OPH EXAM EST PT 1/>: CPT | Performed by: OPTOMETRIST

## 2021-09-20 NOTE — PATIENT INSTRUCTIONS
Myopia, bilateral  New RX given. Patient knows that there may some adaptation to new RX. Discussed myopic shift. Age-related nuclear cataract of both eyes  No treatment is required. Will continue to observe.

## 2021-09-20 NOTE — PROGRESS NOTES
Helga Koehler is a 78year old male. HPI:     HPI     Patient is in for an annual eye exam. Wants me to check his glasses to make sure they were made properly. He has a difficult time seeing  TV at times. Patient has a hx of cataracts with myopic shif 30 tablet 1   • rivaroxaban 20 MG Oral Tab Take 1 tablet (20 mg total) by mouth daily. 30 tablet 0   • Vitamin D3, Cholecalciferol, (VITAMIN D3) 125 MCG (5000 UT) Oral Cap Take 5,000 Units by mouth daily.  Pt takes 5000 units every other day     • Vitamin C Deep and quiet Deep and quiet    Iris Normal Normal    Lens 1+ Nuclear sclerosis 1+ Nuclear sclerosis    Vitreous Clear Clear          Fundus Exam       Right Left    Disc Normal Normal    C/D Ratio 0.3 0.3    Macula Normal Normal    Vessels Normal Normal

## 2021-10-19 ENCOUNTER — TELEPHONE (OUTPATIENT)
Dept: NEPHROLOGY | Facility: CLINIC | Age: 76
End: 2021-10-19

## 2021-10-19 NOTE — TELEPHONE ENCOUNTER
Spoke with pt. Requesting to have flu shot and pneumonia vaccine. Ok to order pnuemovax? Pt had prevnar 11/13/2020.

## 2021-10-21 ENCOUNTER — NURSE ONLY (OUTPATIENT)
Dept: NEPHROLOGY | Facility: CLINIC | Age: 76
End: 2021-10-21
Payer: MEDICARE

## 2021-10-21 DIAGNOSIS — Z23 NEED FOR VACCINATION: Primary | ICD-10-CM

## 2021-10-21 PROCEDURE — 90662 IIV NO PRSV INCREASED AG IM: CPT | Performed by: INTERNAL MEDICINE

## 2021-10-21 PROCEDURE — 90732 PPSV23 VACC 2 YRS+ SUBQ/IM: CPT | Performed by: INTERNAL MEDICINE

## 2021-10-21 PROCEDURE — G0008 ADMIN INFLUENZA VIRUS VAC: HCPCS | Performed by: INTERNAL MEDICINE

## 2021-10-21 PROCEDURE — G0009 ADMIN PNEUMOCOCCAL VACCINE: HCPCS | Performed by: INTERNAL MEDICINE

## 2021-10-26 ENCOUNTER — TELEPHONE (OUTPATIENT)
Dept: NEPHROLOGY | Facility: CLINIC | Age: 76
End: 2021-10-26

## 2021-10-26 NOTE — TELEPHONE ENCOUNTER
Spoke with patient, discussed below message. Pt verbalizes understanding. \"Bps are a little high but overall look good. Continue present management. \"

## 2021-11-09 ENCOUNTER — IMMUNIZATION (OUTPATIENT)
Dept: LAB | Facility: HOSPITAL | Age: 76
End: 2021-11-09
Attending: EMERGENCY MEDICINE
Payer: MEDICARE

## 2021-11-09 DIAGNOSIS — Z23 NEED FOR VACCINATION: Primary | ICD-10-CM

## 2021-11-09 PROCEDURE — 0064A SARSCOV2 VAC 50MCG/0.25ML IM: CPT

## 2021-12-03 RX ORDER — LOSARTAN POTASSIUM 100 MG/1
100 TABLET ORAL DAILY
Qty: 90 TABLET | Refills: 0 | Status: SHIPPED | OUTPATIENT
Start: 2021-12-03 | End: 2022-02-04

## 2021-12-03 NOTE — TELEPHONE ENCOUNTER
Current Outpatient Medications   Medication Sig Dispense Refill   • losartan 100 MG Oral Tab Take 1 tablet (100 mg total) by mouth daily.  30 tablet 1 Will need to be in person if possible.  Not sure who she needs see for her specialist. saw

## 2021-12-03 NOTE — TELEPHONE ENCOUNTER
LOV 7/13/21. RTC in 6 months. No F/U scheduled. Medication noted as taking. Rx pended if appropriate.

## 2021-12-07 ENCOUNTER — TELEPHONE (OUTPATIENT)
Dept: NEPHROLOGY | Facility: CLINIC | Age: 76
End: 2021-12-07

## 2021-12-07 NOTE — TELEPHONE ENCOUNTER
Pt states his wife had an appt yesterday with  and was told  was no longer going to see PCP pt's pt states they did not get a letter and wants to see .

## 2021-12-08 ENCOUNTER — OFFICE VISIT (OUTPATIENT)
Dept: NEPHROLOGY | Facility: CLINIC | Age: 76
End: 2021-12-08
Payer: MEDICARE

## 2021-12-08 VITALS
WEIGHT: 194 LBS | BODY MASS INDEX: 26.28 KG/M2 | SYSTOLIC BLOOD PRESSURE: 125 MMHG | HEIGHT: 72 IN | DIASTOLIC BLOOD PRESSURE: 64 MMHG | HEART RATE: 72 BPM

## 2021-12-08 DIAGNOSIS — E78.00 HYPERCHOLESTEROLEMIA: ICD-10-CM

## 2021-12-08 DIAGNOSIS — D64.9 ANEMIA, UNSPECIFIED TYPE: ICD-10-CM

## 2021-12-08 DIAGNOSIS — I10 ESSENTIAL HYPERTENSION: Primary | ICD-10-CM

## 2021-12-08 PROCEDURE — 99214 OFFICE O/P EST MOD 30 MIN: CPT | Performed by: INTERNAL MEDICINE

## 2021-12-09 ENCOUNTER — LAB ENCOUNTER (OUTPATIENT)
Dept: LAB | Facility: HOSPITAL | Age: 76
End: 2021-12-09
Attending: INTERNAL MEDICINE
Payer: MEDICARE

## 2021-12-09 DIAGNOSIS — E78.00 HYPERCHOLESTEROLEMIA: ICD-10-CM

## 2021-12-09 DIAGNOSIS — I10 ESSENTIAL HYPERTENSION: ICD-10-CM

## 2021-12-09 DIAGNOSIS — D64.9 ANEMIA, UNSPECIFIED TYPE: ICD-10-CM

## 2021-12-09 PROCEDURE — 80061 LIPID PANEL: CPT

## 2021-12-09 PROCEDURE — 83540 ASSAY OF IRON: CPT

## 2021-12-09 PROCEDURE — 84466 ASSAY OF TRANSFERRIN: CPT

## 2021-12-09 PROCEDURE — 80053 COMPREHEN METABOLIC PANEL: CPT

## 2021-12-09 PROCEDURE — 36415 COLL VENOUS BLD VENIPUNCTURE: CPT

## 2021-12-09 PROCEDURE — 85025 COMPLETE CBC W/AUTO DIFF WBC: CPT

## 2021-12-09 PROCEDURE — 82728 ASSAY OF FERRITIN: CPT

## 2021-12-09 NOTE — PROGRESS NOTES
[facility]  Nephrology Daily Progress Note    Kamila Rojas  [id number]  78year old  Patient presents with: Follow - Up: Follow up regular check up      HPI:   Kamila Rojas is a 78year old male.   81-year-old male with a history of hypertensi oz 194 lbs       Constitutional: appears well hydrated alert and responsive no acute distress noted  Neck/Thyroid: neck is supple without adenopathy  Lymphatic: no abnormal cervical, supraclavicular or axillary adenopathy is noted  Respiratory: normal to i Medications:   •  losartan 100 MG Oral Tab, Take 1 tablet (100 mg total) by mouth daily. , Disp: 90 tablet, Rfl: 0  •  atorvastatin 40 MG Oral Tab, Take 1.5 tablets (60 mg total) by mouth nightly., Disp: 135 tablet, Rfl: 1  •  hydrochlorothiazide 12.5 MG Or Atrial fibrillation ([address]      Overall the patient is doing well. Remains in sinus rhythm. Blood pressures were good. Follow-up laboratory studies were ordered.  Check iron studies as he was fairly anemic when he left the hospital. Encouraged him to arrange

## 2022-01-18 NOTE — PROGRESS NOTES
[facility]  Nephrology Daily Progress Note    Becky Croft  [id number]  69year old  Patient presents with:  Medication Follow-Up      HPI:   Becky Croft is a 69year old male.   72-year-old male with a history of hypertension, hypercholestero 8/22/2019 1/14/2020   Weight 187 lbs 185 lbs 191 lbs 13 oz       Constitutional: appears well hydrated alert and responsive no acute distress noted  Neck/Thyroid: neck is supple without adenopathy  Lymphatic: no abnormal cervical, supraclavicular or axilla Take 2 tablets by mouth daily. , Disp: , Rfl:     Allergies:  No Known Allergies         ASSESSMENT/PLAN:   Assessment   Essential hypertension  (primary encounter diagnosis)  Pure hypercholesterolemia  Osteoarthritis, unspecified osteoarthritis type, unspe Spontaneous, unlabored and symmetrical

## 2022-02-04 ENCOUNTER — HOSPITAL ENCOUNTER (OUTPATIENT)
Age: 77
Discharge: HOME | End: 2022-02-04
Payer: MEDICARE

## 2022-02-04 RX ORDER — LOSARTAN POTASSIUM 100 MG/1
100 TABLET ORAL DAILY
Qty: 90 TABLET | Refills: 1 | Status: SHIPPED | OUTPATIENT
Start: 2022-02-04 | End: 2022-03-23

## 2022-02-05 LAB — SARS-COV-2 RNA RESP QL NAA+PROBE: NOT DETECTED

## 2022-03-08 RX ORDER — ATORVASTATIN CALCIUM 40 MG/1
60 TABLET, FILM COATED ORAL NIGHTLY
Qty: 135 TABLET | Refills: 0 | Status: SHIPPED | OUTPATIENT
Start: 2022-03-08 | End: 2022-03-23

## 2022-03-18 RX ORDER — HYDROCHLOROTHIAZIDE 12.5 MG/1
12.5 TABLET ORAL DAILY
Qty: 90 TABLET | Refills: 0 | Status: SHIPPED | OUTPATIENT
Start: 2022-03-18 | End: 2022-03-23

## 2022-03-23 RX ORDER — ATORVASTATIN CALCIUM 40 MG/1
60 TABLET, FILM COATED ORAL NIGHTLY
Qty: 135 TABLET | Refills: 0 | Status: SHIPPED | OUTPATIENT
Start: 2022-03-23

## 2022-03-23 RX ORDER — HYDROCHLOROTHIAZIDE 12.5 MG/1
12.5 TABLET ORAL DAILY
Qty: 90 TABLET | Refills: 0 | Status: SHIPPED | OUTPATIENT
Start: 2022-03-23

## 2022-03-23 RX ORDER — LOSARTAN POTASSIUM 100 MG/1
100 TABLET ORAL DAILY
Qty: 90 TABLET | Refills: 1 | Status: SHIPPED | OUTPATIENT
Start: 2022-03-23

## 2022-03-23 NOTE — TELEPHONE ENCOUNTER
Patient wife was in office and informed that patient will also be transferring care to Dr. Liam Turcios and patient states that he has an upcoming appointment.  Patient wife states that he wants the medications to be under Dr. Liam Turcios instead of Dr. Blanca Brambila just filled medications for patient

## 2022-04-15 ENCOUNTER — OFFICE VISIT (OUTPATIENT)
Dept: OTOLARYNGOLOGY | Facility: CLINIC | Age: 77
End: 2022-04-15
Payer: MEDICARE

## 2022-04-15 VITALS — BODY MASS INDEX: 25.19 KG/M2 | WEIGHT: 186 LBS | HEIGHT: 72 IN

## 2022-04-15 DIAGNOSIS — H61.23 BILATERAL IMPACTED CERUMEN: Primary | ICD-10-CM

## 2022-04-15 PROCEDURE — 69210 REMOVE IMPACTED EAR WAX UNI: CPT | Performed by: OTOLARYNGOLOGY

## 2022-04-15 RX ORDER — RIBOFLAVIN (VITAMIN B2) 100 MG
100 TABLET ORAL DAILY
COMMUNITY

## 2022-04-15 RX ORDER — ASPIRIN 325 MG
325 TABLET ORAL DAILY
COMMUNITY

## 2022-05-03 ENCOUNTER — IMMUNIZATION (OUTPATIENT)
Dept: LAB | Age: 77
End: 2022-05-03
Attending: EMERGENCY MEDICINE
Payer: MEDICARE

## 2022-05-03 DIAGNOSIS — Z23 NEED FOR VACCINATION: Primary | ICD-10-CM

## 2022-05-03 PROCEDURE — 0054A SARSCOV2 VAC 30MCG TRS SUCR: CPT

## 2022-05-18 ENCOUNTER — TELEPHONE (OUTPATIENT)
Dept: INTERNAL MEDICINE CLINIC | Facility: CLINIC | Age: 77
End: 2022-05-18

## 2022-05-18 DIAGNOSIS — Z12.5 SCREENING PSA (PROSTATE SPECIFIC ANTIGEN): ICD-10-CM

## 2022-05-18 DIAGNOSIS — I48.91 ATRIAL FIBRILLATION, UNSPECIFIED TYPE (HCC): ICD-10-CM

## 2022-05-18 DIAGNOSIS — I10 ESSENTIAL HYPERTENSION: Primary | ICD-10-CM

## 2022-05-19 ENCOUNTER — LAB ENCOUNTER (OUTPATIENT)
Dept: LAB | Facility: HOSPITAL | Age: 77
End: 2022-05-19
Attending: INTERNAL MEDICINE
Payer: MEDICARE

## 2022-05-19 DIAGNOSIS — I10 ESSENTIAL HYPERTENSION: ICD-10-CM

## 2022-05-19 DIAGNOSIS — Z12.5 SCREENING PSA (PROSTATE SPECIFIC ANTIGEN): ICD-10-CM

## 2022-05-19 DIAGNOSIS — I48.91 ATRIAL FIBRILLATION, UNSPECIFIED TYPE (HCC): ICD-10-CM

## 2022-05-19 LAB
ALBUMIN SERPL-MCNC: 3.7 G/DL (ref 3.4–5)
ALBUMIN/GLOB SERPL: 1 {RATIO} (ref 1–2)
ALP LIVER SERPL-CCNC: 71 U/L
ALT SERPL-CCNC: 29 U/L
ANION GAP SERPL CALC-SCNC: 6 MMOL/L (ref 0–18)
AST SERPL-CCNC: 27 U/L (ref 15–37)
BILIRUB SERPL-MCNC: 0.7 MG/DL (ref 0.1–2)
BUN BLD-MCNC: 23 MG/DL (ref 7–18)
BUN/CREAT SERPL: 20.7 (ref 10–20)
CALCIUM BLD-MCNC: 9.2 MG/DL (ref 8.5–10.1)
CHLORIDE SERPL-SCNC: 106 MMOL/L (ref 98–112)
CHOLEST SERPL-MCNC: 185 MG/DL (ref ?–200)
CO2 SERPL-SCNC: 28 MMOL/L (ref 21–32)
COMPLEXED PSA SERPL-MCNC: 2 NG/ML (ref ?–4)
CREAT BLD-MCNC: 1.11 MG/DL
DEPRECATED RDW RBC AUTO: 44.1 FL (ref 35.1–46.3)
ERYTHROCYTE [DISTWIDTH] IN BLOOD BY AUTOMATED COUNT: 11.8 % (ref 11–15)
FASTING PATIENT LIPID ANSWER: YES
FASTING STATUS PATIENT QL REPORTED: YES
GLOBULIN PLAS-MCNC: 3.6 G/DL (ref 2.8–4.4)
GLUCOSE BLD-MCNC: 109 MG/DL (ref 70–99)
HCT VFR BLD AUTO: 43.4 %
HDLC SERPL-MCNC: 112 MG/DL (ref 40–59)
HGB BLD-MCNC: 14.1 G/DL
LDLC SERPL CALC-MCNC: 65 MG/DL (ref ?–100)
MCH RBC QN AUTO: 32.7 PG (ref 26–34)
MCHC RBC AUTO-ENTMCNC: 32.5 G/DL (ref 31–37)
MCV RBC AUTO: 100.7 FL
NONHDLC SERPL-MCNC: 73 MG/DL (ref ?–130)
OSMOLALITY SERPL CALC.SUM OF ELEC: 294 MOSM/KG (ref 275–295)
PLATELET # BLD AUTO: 228 10(3)UL (ref 150–450)
POTASSIUM SERPL-SCNC: 4.5 MMOL/L (ref 3.5–5.1)
PROT SERPL-MCNC: 7.3 G/DL (ref 6.4–8.2)
RBC # BLD AUTO: 4.31 X10(6)UL
SODIUM SERPL-SCNC: 140 MMOL/L (ref 136–145)
TRIGL SERPL-MCNC: 38 MG/DL (ref 30–149)
TSI SER-ACNC: 1.22 MIU/ML (ref 0.36–3.74)
VLDLC SERPL CALC-MCNC: 6 MG/DL (ref 0–30)
WBC # BLD AUTO: 4.5 X10(3) UL (ref 4–11)

## 2022-05-19 PROCEDURE — 80061 LIPID PANEL: CPT

## 2022-05-19 PROCEDURE — 85027 COMPLETE CBC AUTOMATED: CPT

## 2022-05-19 PROCEDURE — 84443 ASSAY THYROID STIM HORMONE: CPT

## 2022-05-19 PROCEDURE — 80053 COMPREHEN METABOLIC PANEL: CPT

## 2022-05-19 PROCEDURE — 36415 COLL VENOUS BLD VENIPUNCTURE: CPT

## 2022-06-07 ENCOUNTER — OFFICE VISIT (OUTPATIENT)
Dept: INTERNAL MEDICINE CLINIC | Facility: CLINIC | Age: 77
End: 2022-06-07
Payer: MEDICARE

## 2022-06-07 ENCOUNTER — TELEPHONE (OUTPATIENT)
Dept: GASTROENTEROLOGY | Facility: CLINIC | Age: 77
End: 2022-06-07

## 2022-06-07 VITALS
HEART RATE: 70 BPM | OXYGEN SATURATION: 98 % | HEIGHT: 72 IN | DIASTOLIC BLOOD PRESSURE: 60 MMHG | SYSTOLIC BLOOD PRESSURE: 122 MMHG | BODY MASS INDEX: 26.41 KG/M2 | WEIGHT: 195 LBS | RESPIRATION RATE: 14 BRPM

## 2022-06-07 DIAGNOSIS — I10 ESSENTIAL HYPERTENSION: Primary | ICD-10-CM

## 2022-06-07 DIAGNOSIS — Z12.11 COLON CANCER SCREENING: Primary | ICD-10-CM

## 2022-06-07 DIAGNOSIS — Z12.11 SCREEN FOR COLON CANCER: ICD-10-CM

## 2022-06-07 DIAGNOSIS — E78.00 HYPERCHOLESTEROLEMIA: ICD-10-CM

## 2022-06-07 DIAGNOSIS — I48.91 ATRIAL FIBRILLATION, UNSPECIFIED TYPE (HCC): Chronic | ICD-10-CM

## 2022-06-07 PROCEDURE — 1126F AMNT PAIN NOTED NONE PRSNT: CPT | Performed by: INTERNAL MEDICINE

## 2022-06-07 PROCEDURE — 99214 OFFICE O/P EST MOD 30 MIN: CPT | Performed by: INTERNAL MEDICINE

## 2022-06-07 RX ORDER — LOSARTAN POTASSIUM 100 MG/1
100 TABLET ORAL DAILY
Qty: 90 TABLET | Refills: 1 | Status: SHIPPED | OUTPATIENT
Start: 2022-06-07

## 2022-06-07 RX ORDER — HYDROCHLOROTHIAZIDE 12.5 MG/1
12.5 TABLET ORAL DAILY
Qty: 90 TABLET | Refills: 1 | Status: SHIPPED | OUTPATIENT
Start: 2022-06-07

## 2022-06-08 NOTE — TELEPHONE ENCOUNTER
Patient is (over)due for a screening colonoscopy as of June 2020. Unable to recall any previous operative report, but per last TE 10-, patient is due for screening colonoscopy in June 2022 in the absence of any new symptoms per Dr. Dunia Osborne.

## 2022-06-09 NOTE — TELEPHONE ENCOUNTER
Dr. Deborah Fong-    Please advise on prep and orders for patient to schedule a colonoscopy. Unable to retrieve previous operative report. Per TE 10-, patient is due for screening colonoscopy in June 2022 in the absence of any new symptoms. Anticoagulants:  mg   Diabetic Meds: no  BP meds(Ace inhibitors/ARB's):Losartan  Weight loss meds (phentermine/vyvanse): no  Iron supplement (RX/OTC): no  Height & Weight/BMI:6\", 195 lb, BMI 26.44  Hx of Cardiac/CVA issues/(MI/Stroke):one time a fib event after TKR. Seen by Dr. Liz Prabhakar following. Devices Pacemaker/Defibrillator/Stents:no  Resp.  Issues/Oxygen Use/MARIBEL/COPD: no  Issues w/Anesthesia:no  Symptoms (Y/N): no

## 2022-06-13 RX ORDER — SODIUM, POTASSIUM,MAG SULFATES 17.5-3.13G
SOLUTION, RECONSTITUTED, ORAL ORAL
Qty: 1 EACH | Refills: 0 | Status: SHIPPED | OUTPATIENT
Start: 2022-06-13

## 2022-06-13 NOTE — TELEPHONE ENCOUNTER
May schedule a screening colonoscopy following a split dose Suprep (prescription sent) and monitored anesthesia care.

## 2022-07-01 RX ORDER — ATORVASTATIN CALCIUM 40 MG/1
TABLET, FILM COATED ORAL
Qty: 135 TABLET | Refills: 0 | Status: SHIPPED | OUTPATIENT
Start: 2022-07-01

## 2022-07-12 ENCOUNTER — TELEPHONE (OUTPATIENT)
Dept: INTERNAL MEDICINE CLINIC | Facility: CLINIC | Age: 77
End: 2022-07-12

## 2022-07-12 NOTE — TELEPHONE ENCOUNTER
Patient calling stating Dr. Mark Garcia wanted to see patient after colonoscopy, patient states they couldn't get him in until December, patient would like to know if he should keep October appointment or rescheduled for after the colonoscopy

## 2022-07-12 NOTE — TELEPHONE ENCOUNTER
Scheduled for:  Colonoscopy 10053  Provider Name:  Dr Nazia Cotto  Date:  12/20/2022  Location:  [facility]  Sedation:  MAC  Time:  8:45 (pt is aware to arrive at 7:45am)  Prep:  Suprep  Meds/Allergies Reconciled?:  Physician reviewed   Diagnosis with codes:  Colon screening Z12.11  Was patient informed to call insurance with codes (Y/N):  Yes  Referral sent?:  Referral was sent at the time of electronic surgical scheduling. [facility] or [address] [place] [address] notified?:  I sent an electronic request to Endo Scheduling and received a confirmation today. Medication Orders:   This patient verbally confirmed that he is not taking:  Iron, blood thinners and is not diabetic  Not latex allergy, Not PCN allergy and does not have a pacemaker  Patient is aware to HOLD Losartan the day of the procedure   Pt is aware to NOT take iron pills, herbal meds and diet supplements for 7 days before exam. Also to NOT take any form of alcohol, recreational drugs and any forms of ED meds 24 hours before exam.         Misc Orders:  Patient is aware that the ENDO dept will call to schedule a COVID 19 test before the procedure      Further instructions given by staff:   I discussed the prep instructions with the patient which he verbally understood and is aware that I will mail and MyChart  the instructions today

## 2022-09-09 RX ORDER — ATORVASTATIN CALCIUM 40 MG/1
60 TABLET, FILM COATED ORAL NIGHTLY
Qty: 135 TABLET | Refills: 1 | Status: SHIPPED | OUTPATIENT
Start: 2022-09-09

## 2022-09-13 ENCOUNTER — IMMUNIZATION (OUTPATIENT)
Dept: LAB | Age: 77
End: 2022-09-13
Attending: EMERGENCY MEDICINE
Payer: MEDICARE

## 2022-09-13 DIAGNOSIS — Z23 NEED FOR VACCINATION: Primary | ICD-10-CM

## 2022-09-13 PROCEDURE — 0124A SARSCOV2 VAC BVL 30MCG/0.3ML: CPT

## 2022-09-27 ENCOUNTER — OFFICE VISIT (OUTPATIENT)
Dept: OPHTHALMOLOGY | Facility: CLINIC | Age: 77
End: 2022-09-27

## 2022-09-27 DIAGNOSIS — H43.393 FLOATER, VITREOUS, BILATERAL: ICD-10-CM

## 2022-09-27 DIAGNOSIS — H25.13 AGE-RELATED NUCLEAR CATARACT OF BOTH EYES: Primary | ICD-10-CM

## 2022-09-27 PROCEDURE — 92004 COMPRE OPH EXAM NEW PT 1/>: CPT | Performed by: OPHTHALMOLOGY

## 2022-09-27 PROCEDURE — 92015 DETERMINE REFRACTIVE STATE: CPT | Performed by: OPHTHALMOLOGY

## 2022-09-27 NOTE — ASSESSMENT & PLAN NOTE
Discussed moderate cataracts in both eyes that are affecting vision but are not surgical at this time. New glasses today; suggest update. Reassured patient that other than cataracts, eyes look very healthy; there is no evidence of glaucoma or macular degeneration in either eye.

## 2022-09-27 NOTE — PATIENT INSTRUCTIONS
Age-related nuclear cataract of both eyes  Discussed moderate cataracts in both eyes that are affecting vision but are not surgical at this time. New glasses today; suggest update. Reassured patient that other than cataracts, eyes look very healthy; there is no evidence of glaucoma or macular degeneration in either eye. Floater, vitreous, bilateral   There is no evidence of retinal pathology. All signs and symptoms of retinal detachment/tears explained in detail. Patient instructed to call the office if they experience increase in floaters, increase in flashes of light, loss of vision or curtain or veil effect.

## 2022-10-11 ENCOUNTER — IMMUNIZATION (OUTPATIENT)
Dept: FAMILY MEDICINE CLINIC | Facility: CLINIC | Age: 77
End: 2022-10-11
Payer: MEDICARE

## 2022-10-11 ENCOUNTER — OFFICE VISIT (OUTPATIENT)
Facility: CLINIC | Age: 77
End: 2022-10-11
Payer: MEDICARE

## 2022-10-11 VITALS
TEMPERATURE: 98 F | DIASTOLIC BLOOD PRESSURE: 72 MMHG | HEART RATE: 72 BPM | SYSTOLIC BLOOD PRESSURE: 124 MMHG | RESPIRATION RATE: 18 BRPM | WEIGHT: 193 LBS | BODY MASS INDEX: 26.14 KG/M2 | HEIGHT: 72 IN

## 2022-10-11 DIAGNOSIS — E78.00 HYPERCHOLESTEROLEMIA: ICD-10-CM

## 2022-10-11 DIAGNOSIS — M17.11 PRIMARY OSTEOARTHRITIS OF RIGHT KNEE: ICD-10-CM

## 2022-10-11 DIAGNOSIS — I10 ESSENTIAL HYPERTENSION: Primary | ICD-10-CM

## 2022-10-11 PROCEDURE — G0008 ADMIN INFLUENZA VIRUS VAC: HCPCS | Performed by: NURSE PRACTITIONER

## 2022-10-11 PROCEDURE — 90662 IIV NO PRSV INCREASED AG IM: CPT | Performed by: NURSE PRACTITIONER

## 2022-10-11 PROCEDURE — 1126F AMNT PAIN NOTED NONE PRSNT: CPT | Performed by: INTERNAL MEDICINE

## 2022-10-11 PROCEDURE — 99214 OFFICE O/P EST MOD 30 MIN: CPT | Performed by: INTERNAL MEDICINE

## 2022-10-11 RX ORDER — VALSARTAN AND HYDROCHLOROTHIAZIDE 160; 12.5 MG/1; MG/1
TABLET, FILM COATED ORAL
COMMUNITY
End: 2022-10-11

## 2022-10-11 RX ORDER — AMIODARONE HYDROCHLORIDE 200 MG/1
TABLET ORAL
COMMUNITY
End: 2022-10-11 | Stop reason: ALTCHOICE

## 2022-10-11 RX ORDER — AMOXICILLIN 250 MG
CAPSULE ORAL
COMMUNITY
End: 2022-10-11 | Stop reason: ALTCHOICE

## 2022-10-11 RX ORDER — INFLUENZA A VIRUS A/MICHIGAN/45/2015 X-275 (H1N1) ANTIGEN (FORMALDEHYDE INACTIVATED), INFLUENZA A VIRUS A/SINGAPORE/INFIMH-16-0019/2016 IVR-186 (H3N2) ANTIGEN (FORMALDEHYDE INACTIVATED), INFLUENZA B VIRUS B/PHUKET/3073/2013 ANTIGEN (FORMALDEHYDE INACTIVATED), AND INFLUENZA B VIRUS B/MARYLAND/15/2016 BX-69A ANTIGEN (FORMALDEHYDE INACTIVATED) 60; 60; 60; 60 UG/.7ML; UG/.7ML; UG/.7ML; UG/.7ML
INJECTION, SUSPENSION INTRAMUSCULAR
COMMUNITY
End: 2022-10-11 | Stop reason: ALTCHOICE

## 2022-11-01 ENCOUNTER — MED REC SCAN ONLY (OUTPATIENT)
Dept: INTERNAL MEDICINE CLINIC | Facility: CLINIC | Age: 77
End: 2022-11-01

## 2022-11-17 RX ORDER — HYDROCHLOROTHIAZIDE 12.5 MG/1
12.5 TABLET ORAL DAILY
Qty: 90 TABLET | Refills: 1 | Status: SHIPPED | OUTPATIENT
Start: 2022-11-17

## 2022-11-17 NOTE — TELEPHONE ENCOUNTER
Refill passed per [address] protocol. Requested Prescriptions   Pending Prescriptions Disp Refills    hydroCHLOROthiazide 12.5 MG Oral Tab 90 tablet 1     Sig: Take 1 tablet (12.5 mg total) by mouth daily.        Hypertensive Medications Protocol Passed - 11/17/2022 12:48 PM        Passed - In person appointment in the past 12 or next 3 months     Recent Outpatient Visits              1 month ago Essential hypertension    [facility]line Claudio, Darien Zazueta MD    Office Visit    1 month ago Age-related nuclear cataract of both eyes    [facility] Nadya Barber MD    Office Visit    5 months ago Essential hypertension    [address], [address], Darien Zazueta MD    Office Visit    7 months ago Bilateral impacted cerumen    [facility], [address], Jessica Merrill MD    Office Visit    11 months ago Essential hypertension    [facility] Cl[facility] Nimitz, Andrews Rondon MD    Office Visit          Future Appointments         Provider Department Appt Notes    In 1 month RON, PROCEDURE [address] GI PROCEDURE [email or web address]    In 4 months Griffin Beasley MD [address] [place] [address], [address], [place] 6 MONTH  FOLLOW UP AND MEDICARE PHYSICAL                Passed - Last BP reading less than 140/90     BP Readings from Last 1 Encounters:  10/11/22 : 124/72              Passed - CMP or BMP in past 6 months     Recent Results (from the past 4392 hour(s))   COMP METABOLIC PANEL (14)    Collection Time: 05/19/22 10:05 AM   Result Value Ref Range    Glucose 109 (H) 70 - 99 mg/dL    Sodium 140 136 - 145 mmol/L    Potassium 4.5 3.5 - 5.1 mmol/L    Chloride 106 98 - 112 mmol/L    CO2 28.0 21.0 - 32.0 mmol/L    Anion Gap 6 0 - 18 mmol/L    BUN 23 (H) 7 - 18 mg/dL    Creatinine 1.11 0.70 - 1.30 mg/dL    BUN/CREA Ratio 20.7 (H) 10.0 - 20.0    Calcium, Total 9.2 8.5 - 10.1 mg/dL    Calculated Osmolality 294 275 - 295 mOsm/kg    GFR, Non- 65 >=60    GFR, -American 75 >=60    ALT 29 16 - 61 U/L    AST 27 15 - 37 U/L    Alkaline Phosphatase 71 45 - 117 U/L    Bilirubin, Total 0.7 0.1 - 2.0 mg/dL    Total Protein 7.3 6.4 - 8.2 g/dL    Albumin 3.7 3.4 - 5.0 g/dL    Globulin  3.6 2.8 - 4.4 g/dL    A/G Ratio 1.0 1.0 - 2.0    Patient Fasting for CMP? Yes      *Note: Due to a large number of results and/or encounters for the requested time period, some results have not been displayed. A complete set of results can be found in Results Review.                Passed - In person appointment or virtual visit in the past 6 months     Recent Outpatient Visits              1 month ago Essential hypertension    Kacey Ordoñez MD    Office Visit    1 month ago Age-related nuclear cataract of both eyes    [facility] Op[facility] Oralia Dailey MD    Office Visit    5 months ago Essential hypertension    [place] [facility], [address], MD Kacey    Office Visit    7 months ago Bilateral impacted cerumen    [facility], [address], Lily Merrill MD    Office Visit    11 months ago Essential hypertension    [place] [facility], [address] [facility] [place], MD    Office Visit          Future Appointments         Provider Department Appt Notes    In 1 month STATHOPOULOS, PROCEDURE [address] GI PROCEDURE [email or web address]    In 4 months Jeniffer Mcdonald MD [facility], Jeni Fuchs 6 MONTH  FOLLOW UP AND MEDICARE PHYSICAL                Passed - EGFRCR or GFRNAA > 50     GFR Evaluation  GFRNAA: 72 , resulted on 5/19/2022                Future Appointments         Provider Department Appt Notes    In 1 month STATHOPOULOS, PROCEDURE [address] GI PROCEDURE [email or web address]    In 4 months Jeniffer Mcdonald MD [facility], Jeni Post 6 MONTH  FOLLOW UP AND MEDICARE PHYSICAL Recent Outpatient Visits              1 month ago Essential hypertension    Chidi Jordan MD    Office Visit    1 month ago Age-related nuclear cataract of both eyes    [facility] Op[facility] Maggie Luke MD    Office Visit    5 months ago Essential hypertension    [facility], [address], Holly Montano MD    Office Visit    7 months ago Bilateral impacted cerumen    [facility], [address], Carlene Merrill MD    Office Visit    11 months ago Essential hypertension    Chano Hutchinson MD    Office Visit

## 2022-12-07 ENCOUNTER — TELEPHONE (OUTPATIENT)
Dept: INTERNAL MEDICINE CLINIC | Facility: CLINIC | Age: 77
End: 2022-12-07

## 2022-12-07 NOTE — TELEPHONE ENCOUNTER
Patient advised CDC guidelines and has already received bivalent vaccine 9/13/22    People ages 11 years and older are recommended to receive 1 bivalent mRNA booster dose after completion of any FDA-approved or FDA-authorized monovalent primary series or previously received monovalent booster dose(s).

## 2022-12-20 ENCOUNTER — HOSPITAL ENCOUNTER (OUTPATIENT)
Facility: HOSPITAL | Age: 77
Setting detail: HOSPITAL OUTPATIENT SURGERY
Discharge: HOME | End: 2022-12-20
Attending: INTERNAL MEDICINE | Admitting: INTERNAL MEDICINE
Payer: MEDICARE

## 2022-12-20 ENCOUNTER — ANESTHESIA EVENT (OUTPATIENT)
Dept: ENDOSCOPY | Facility: HOSPITAL | Age: 77
End: 2022-12-20
Payer: MEDICARE

## 2022-12-20 ENCOUNTER — ANESTHESIA (OUTPATIENT)
Dept: ENDOSCOPY | Facility: HOSPITAL | Age: 77
End: 2022-12-20
Payer: MEDICARE

## 2022-12-20 VITALS
OXYGEN SATURATION: 95 % | TEMPERATURE: 98 F | SYSTOLIC BLOOD PRESSURE: 136 MMHG | HEART RATE: 59 BPM | BODY MASS INDEX: 25.06 KG/M2 | WEIGHT: 185 LBS | RESPIRATION RATE: 20 BRPM | DIASTOLIC BLOOD PRESSURE: 81 MMHG | HEIGHT: 72 IN

## 2022-12-20 DIAGNOSIS — Z12.11 COLON CANCER SCREENING: ICD-10-CM

## 2022-12-20 PROCEDURE — 45385 COLONOSCOPY W/LESION REMOVAL: CPT | Performed by: INTERNAL MEDICINE

## 2022-12-20 PROCEDURE — 0DBN8ZX EXCISION OF SIGMOID COLON, VIA NATURAL OR ARTIFICIAL OPENING ENDOSCOPIC, DIAGNOSTIC: ICD-10-PCS | Performed by: INTERNAL MEDICINE

## 2022-12-20 PROCEDURE — 0DBL8ZX EXCISION OF TRANSVERSE COLON, VIA NATURAL OR ARTIFICIAL OPENING ENDOSCOPIC, DIAGNOSTIC: ICD-10-PCS | Performed by: INTERNAL MEDICINE

## 2022-12-20 RX ORDER — LIDOCAINE HYDROCHLORIDE 10 MG/ML
INJECTION, SOLUTION EPIDURAL; INFILTRATION; INTRACAUDAL; PERINEURAL AS NEEDED
Status: DISCONTINUED | OUTPATIENT
Start: 2022-12-20 | End: 2022-12-20 | Stop reason: SURG

## 2022-12-20 RX ORDER — SODIUM CHLORIDE, SODIUM LACTATE, POTASSIUM CHLORIDE, CALCIUM CHLORIDE 600; 310; 30; 20 MG/100ML; MG/100ML; MG/100ML; MG/100ML
INJECTION, SOLUTION INTRAVENOUS CONTINUOUS
Status: DISCONTINUED | OUTPATIENT
Start: 2022-12-20 | End: 2022-12-20

## 2022-12-20 RX ORDER — SODIUM CHLORIDE, SODIUM LACTATE, POTASSIUM CHLORIDE, CALCIUM CHLORIDE 600; 310; 30; 20 MG/100ML; MG/100ML; MG/100ML; MG/100ML
INJECTION, SOLUTION INTRAVENOUS CONTINUOUS PRN
Status: DISCONTINUED | OUTPATIENT
Start: 2022-12-20 | End: 2022-12-20 | Stop reason: SURG

## 2022-12-20 RX ADMIN — LIDOCAINE HYDROCHLORIDE 50 MG: 10 INJECTION, SOLUTION EPIDURAL; INFILTRATION; INTRACAUDAL; PERINEURAL at 08:24:00

## 2022-12-20 RX ADMIN — SODIUM CHLORIDE, SODIUM LACTATE, POTASSIUM CHLORIDE, CALCIUM CHLORIDE: 600; 310; 30; 20 INJECTION, SOLUTION INTRAVENOUS at 08:22:00

## 2022-12-20 RX ADMIN — SODIUM CHLORIDE, SODIUM LACTATE, POTASSIUM CHLORIDE, CALCIUM CHLORIDE: 600; 310; 30; 20 INJECTION, SOLUTION INTRAVENOUS at 08:59:00

## 2022-12-20 NOTE — ANESTHESIA POSTPROCEDURE EVALUATION
Patient: Ervin Kan    Procedure Summary     Date: 12/20/22 Room / Location: [address] [place] [address] ENDOSCOPY 04 / [address] [place] [address] ENDOSCOPY    Anesthesia Start: 6829 Anesthesia Stop:     Procedure: COLONOSCOPY Diagnosis:       Colon cancer screening      (polyps)    Surgeons: Manas Godwin MD Anesthesiologist: Clarinda Ganser, CRNA    Anesthesia Type: MAC ASA Status: 3          Anesthesia Type: MAC    Vitals Value Taken Time   BP 95/64 12/20/22 0859   Temp  12/20/22 0900   Pulse 61 12/20/22 0859   Resp 17 12/20/22 0859   SpO2 99 % 12/20/22 0859   Vitals shown include unvalidated device data.     [address] [place] [address] AN Post Evaluation:   Patient Evaluated in PACU  Patient Participation: complete - patient participated  Level of Consciousness: responsive to verbal stimuli  Pain Score: 0  Pain Management: adequate  Airway Patency:patent  Yes    Cardiovascular Status: hemodynamically stable  Respiratory Status: room air  Postoperative Hydration acceptable      Edmar Parker CRNA  12/20/2022 9:00 AM

## 2022-12-20 NOTE — OPERATIVE REPORT
[facility] Endoscopy Report      Date of Procedure:  12/20/22      Preoperative Diagnosis:  Colorectal cancer screening      Postoperative Diagnosis:  Colon polyps      Procedure:    Colonoscopy with polypectomy      Surgeon:  Bam Naik M.D. Anesthesia:  Monitored anesthesia care  Cecal withdrawal time: 21 minutes  EBL:  Insignificant      Brief History: This is a 78year old male who presents for a screening colonoscopy his last examination was 12 years prior. He has had no lower gastrointestinal tract symptoms, signs or family history of colorectal cancer. Technique:  After informed consent, the patient was placed in the left lateral recumbent position. Digital rectal examination revealed no palpable intraluminal abnormalities. An Olympus variable stiffness 190 series HD colonoscope was inserted into the rectum and advanced under direct vision by following the lumen to the terminal ileum. The colon was examined upon withdrawal in the left lateral recumbent position. Findings:  The preparation of the colon was excellent. The terminal ileum was examined for 5 cm and visually normal.  The ileocecal valve was well preserved. The visualized colonic mucosa from the cecum to the anal verge was normal with an intact vascular pattern. There were #2 polyps seen within the colon which were removed as follows:    1. In the mid transverse colon there was a 7-8 mm sessile polyp which was cold snare excised and retrieved. 2.  In the mid sigmoid colon there was a 5 mm sessile polyp which was cold snare excised and retrieved. Inspection of both sites revealed no evidence of ongoing bleeding. There were no other colonic polyps, definite diverticula, mass lesions, vascular anomalies or signs of inflammation seen. Retroflexion in the rectum revealed no abnormalities. The procedure was well tolerated without immediate complication. Impression:  1. Colon polyps  2. Otherwise normal colonoscopy to the terminal ileum    Recommendations:  1. Follow-up biopsy results. 2.  Further recommendations pending biopsy.         Jennifer Singh MD  12/20/2022

## 2022-12-20 NOTE — DISCHARGE INSTRUCTIONS

## 2023-01-25 RX ORDER — LOSARTAN POTASSIUM 100 MG/1
100 TABLET ORAL DAILY
Qty: 90 TABLET | Refills: 1 | Status: SHIPPED | OUTPATIENT
Start: 2023-01-25

## 2023-01-25 NOTE — TELEPHONE ENCOUNTER
Please review; protocol failed/no protocol. Requested Prescriptions   Pending Prescriptions Disp Refills    losartan 100 MG Oral Tab 90 tablet 1     Sig: Take 1 tablet (100 mg total) by mouth daily. Hypertensive Medications Protocol Failed - 1/24/2023  1:38 PM        Failed - CMP or BMP in past 6 months     No results found for this or any previous visit (from the past 4392 hour(s)).             Passed - In person appointment in the past 12 or next 3 months     Recent Outpatient Visits              3 months ago Essential hypertension    [facility], [facility], MD Kacey    Office Visit    4 months ago Age-related nuclear cataract of both eyes    [address] [place], [place] [address]Nara MD    Office Visit    7 months ago Essential hypertension    [address] [place] [address], MD Kacey    Office Visit    9 months ago Bilateral impacted cerumen    [facility], [address] [facility],[address], [facility] Pizarro MD    Office Visit    1 year ago Essential hypertension    Chano Ortiz MD    Office Visit          Future Appointments         Provider Department Appt Notes    In 2 months Hien Garnica MD [facility], [place] [address] [place] [address] 6 MONTH  FOLLOW UP AND MEDICARE PHYSICAL                Passed - Last BP reading less than 140/90     BP Readings from Last 1 Encounters:  12/20/22 : 136/81              Passed - In person appointment or virtual visit in the past 6 months     Recent Outpatient Visits              3 months ago Essential hypertension    Kacey Ortiz MD    Office Visit    4 months ago Age-related nuclear cataract of both eyes    [address] [place] [address], Keerthi Benton MD    Office Visit    7 months ago Essential hypertension    [facility], [address], Yordy Rutherford MD    Office Visit    9 months ago Bilateral impacted cerumen    [facility], [address], Arron Merrill MD    Office Visit    1 year ago Essential hypertension    [facility], [address] [place] [address], [place] MD Jorge    Office Visit          Future Appointments         Provider Department Appt Notes    In 2 months [facility], [address] [place] [address]  FOLLOW UP AND MEDICARE PHYSICAL                Passed - EGFRCR or GFRNAA > 50     GFR Evaluation  GFRNAA: 72 , resulted on 5/19/2022                Recent Outpatient Visits              3 months ago [facility]ential hypertension    [facility], [address] [place] [address], Yordy Rutherford MD    Office Visit    4 months ago Age-related nuclear cataract of both eyes    [address] [facility], [place], Heidi Villasenor MD    Office Visit    7 months ago Essential hypertension    [address] [facility], Yordy Rutherford MD    Office Visit    9 months ago Bilateral impacted cerumen    [facility], [address], Arron Merrill MD    Office Visit    1 year ago Essential hypertension    Pauly Escobar, Mel Berkowitz MD    Office Visit             Future Appointments         Provider Department Appt Notes    In 2 months [facility], [address] [facility]  FOLLOW UP AND MEDICARE PHYSICAL

## 2023-03-09 RX ORDER — ATORVASTATIN CALCIUM 40 MG/1
60 TABLET, FILM COATED ORAL NIGHTLY
Qty: 135 TABLET | Refills: 3 | Status: SHIPPED | OUTPATIENT
Start: 2023-03-09

## 2023-03-09 NOTE — TELEPHONE ENCOUNTER
Refill passed per [place] [employer] protocol. Requested Prescriptions   Pending Prescriptions Disp Refills    atorvastatin 40 MG Oral Tab 135 tablet 3     Sig: Take 1.5 tablets (60 mg total) by mouth nightly.        Cholesterol Medication Protocol Passed - 3/9/2023 11:55 AM        Passed - ALT in past 12 months        Passed - LDL in past 12 months        Passed - Last ALT < 80     Lab Results   Component Value Date    ALT 29 05/19/2022             Passed - Last LDL < 130     Lab Results   Component Value Date    LDL 65 05/19/2022             Passed - In person appointment or virtual visit in the past 12 mos or appointment in next 3 mos     Recent Outpatient Visits              4 months ago Essential hypertension    [address], [address] [facility], MD Kacey    Office Visit    5 months ago Age-related nuclear cataract of both eyes    Arleen Reno MD    Office Visit    9 months ago Essential hypertension    [address] [facility], MD Kacey    Office Visit    10 months ago Bilateral impacted cerumen    [facility], [address], [facility], Neri Boggs MD    Office Visit    1 year ago Essential hypertension    [facility] [place] [facility]    Office Visit          Future Appointments         Provider Department Appt Notes    In 1 month Cindy Byrd MD [address], [place] [address] [facility], [place] 6 MONTH  FOLLOW UP AND MEDICARE PHYSICAL     In 6 months MD [facility], [address], [facility]

## 2023-03-09 NOTE — TELEPHONE ENCOUNTER
90 DAY SUPPLY      atorvastatin 40 MG Oral Tab, Take 1.5 tablets (60 mg total) by mouth nightly., Disp: 135 tablet, Rfl: 1

## 2023-04-07 ENCOUNTER — HOSPITAL ENCOUNTER (OUTPATIENT)
Age: 78
Discharge: HOME | End: 2023-04-07
Attending: EMERGENCY MEDICINE
Payer: MEDICARE

## 2023-04-07 ENCOUNTER — NURSE TRIAGE (OUTPATIENT)
Dept: INTERNAL MEDICINE CLINIC | Facility: CLINIC | Age: 78
End: 2023-04-07

## 2023-04-07 ENCOUNTER — APPOINTMENT (OUTPATIENT)
Dept: CT IMAGING | Age: 78
End: 2023-04-07
Attending: EMERGENCY MEDICINE
Payer: MEDICARE

## 2023-04-07 VITALS
SYSTOLIC BLOOD PRESSURE: 137 MMHG | DIASTOLIC BLOOD PRESSURE: 65 MMHG | HEART RATE: 58 BPM | RESPIRATION RATE: 20 BRPM | TEMPERATURE: 98 F | OXYGEN SATURATION: 100 %

## 2023-04-07 DIAGNOSIS — N23 RENAL COLIC: Primary | ICD-10-CM

## 2023-04-07 LAB
#MXD IC: 0.6 X10ˆ3/UL (ref 0.1–1)
BILIRUB UR QL STRIP: NEGATIVE
BUN BLD-MCNC: 24 MG/DL (ref 7–18)
CHLORIDE BLD-SCNC: 105 MMOL/L (ref 98–112)
CLARITY UR: CLEAR
CO2 BLD-SCNC: 27 MMOL/L (ref 21–32)
COLOR UR: YELLOW
CREAT BLD-MCNC: 1.1 MG/DL
GFR SERPLBLD BASED ON 1.73 SQ M-ARVRAT: 70 ML/MIN/1.73M2 (ref 60–?)
GLUCOSE BLD-MCNC: 100 MG/DL (ref 70–99)
GLUCOSE UR STRIP-MCNC: NEGATIVE MG/DL
HCT VFR BLD AUTO: 42.8 %
HCT VFR BLD CALC: 45 %
HGB BLD-MCNC: 13.7 G/DL
ISTAT IONIZED CALCIUM FOR CHEM 8: 1.13 MMOL/L (ref 1.12–1.32)
KETONES UR STRIP-MCNC: NEGATIVE MG/DL
LEUKOCYTE ESTERASE UR QL STRIP: NEGATIVE
LYMPHOCYTES # BLD AUTO: 0.9 X10ˆ3/UL (ref 1–4)
LYMPHOCYTES NFR BLD AUTO: 12.5 %
MCH RBC QN AUTO: 31.6 PG (ref 26–34)
MCHC RBC AUTO-ENTMCNC: 32 G/DL (ref 31–37)
MCV RBC AUTO: 98.6 FL (ref 80–100)
MIXED CELL %: 8 %
NEUTROPHILS # BLD AUTO: 5.4 X10ˆ3/UL (ref 1.5–7.7)
NEUTROPHILS NFR BLD AUTO: 79.5 %
NITRITE UR QL STRIP: NEGATIVE
PH UR STRIP: 5.5 [PH]
PLATELET # BLD AUTO: 226 X10ˆ3/UL (ref 150–450)
POTASSIUM BLD-SCNC: 4.1 MMOL/L (ref 3.6–5.1)
PROT UR STRIP-MCNC: NEGATIVE MG/DL
RBC # BLD AUTO: 4.34 X10ˆ6/UL
SODIUM BLD-SCNC: 142 MMOL/L (ref 136–145)
SP GR UR STRIP: 1.02
UROBILINOGEN UR STRIP-ACNC: <2 MG/DL
WBC # BLD AUTO: 6.9 X10ˆ3/UL (ref 4–11)

## 2023-04-07 PROCEDURE — 81002 URINALYSIS NONAUTO W/O SCOPE: CPT

## 2023-04-07 PROCEDURE — 74176 CT ABD & PELVIS W/O CONTRAST: CPT | Performed by: EMERGENCY MEDICINE

## 2023-04-07 PROCEDURE — 99215 OFFICE O/P EST HI 40 MIN: CPT

## 2023-04-07 PROCEDURE — 85025 COMPLETE CBC W/AUTO DIFF WBC: CPT | Performed by: EMERGENCY MEDICINE

## 2023-04-07 PROCEDURE — 96372 THER/PROPH/DIAG INJ SC/IM: CPT

## 2023-04-07 PROCEDURE — 80047 BASIC METABLC PNL IONIZED CA: CPT

## 2023-04-07 PROCEDURE — 36415 COLL VENOUS BLD VENIPUNCTURE: CPT

## 2023-04-07 PROCEDURE — 99214 OFFICE O/P EST MOD 30 MIN: CPT

## 2023-04-07 RX ORDER — KETOROLAC TROMETHAMINE 30 MG/ML
30 INJECTION, SOLUTION INTRAMUSCULAR; INTRAVENOUS ONCE
Status: COMPLETED | OUTPATIENT
Start: 2023-04-07 | End: 2023-04-07

## 2023-04-07 RX ORDER — KETOROLAC TROMETHAMINE 10 MG/1
10 TABLET, FILM COATED ORAL EVERY 6 HOURS PRN
Qty: 10 TABLET | Refills: 0 | Status: SHIPPED | OUTPATIENT
Start: 2023-04-07 | End: 2023-04-11

## 2023-04-07 RX ORDER — HYDROCODONE BITARTRATE AND ACETAMINOPHEN 5; 325 MG/1; MG/1
1-2 TABLET ORAL EVERY 6 HOURS PRN
Qty: 10 TABLET | Refills: 0 | Status: SHIPPED | OUTPATIENT
Start: 2023-04-07 | End: 2023-04-11

## 2023-04-07 NOTE — ED INITIAL ASSESSMENT (HPI)
Pt awoke at 7am with constant, sharp LLQ pain that would not go away despite positioning but stopped upon arrival to clinic; denies fever, urinary symptoms, or emesis; hx kidney stones

## 2023-04-07 NOTE — ED QUICK NOTES
Patient ambulated to bathroom. States left flank pain becoming more intense. No nausea. Medicated with Toradol as ordered by Dr. Lauro Wells.

## 2023-04-07 NOTE — DISCHARGE INSTRUCTIONS
Thank you for visiting our immediate care for your health care needs. Please follow up with your regular doctor in the next 1-2 days. If you have any additional problems please return to the immediate care. Take ketorolac and Norco as prescribed. Please do not drink alcohol, drive, or operate heavy machinery on Norco. It can also make you constipated. You can take Colace over-the-counter to help prevent that.    Did not take additional acetaminophen with Norco. It is OK to take ketorolac with norco.

## 2023-04-11 ENCOUNTER — OFFICE VISIT (OUTPATIENT)
Facility: CLINIC | Age: 78
End: 2023-04-11

## 2023-04-11 VITALS
HEIGHT: 72 IN | SYSTOLIC BLOOD PRESSURE: 128 MMHG | DIASTOLIC BLOOD PRESSURE: 80 MMHG | BODY MASS INDEX: 25.87 KG/M2 | RESPIRATION RATE: 14 BRPM | WEIGHT: 191 LBS | OXYGEN SATURATION: 97 % | HEART RATE: 86 BPM

## 2023-04-11 DIAGNOSIS — M17.11 PRIMARY OSTEOARTHRITIS OF RIGHT KNEE: ICD-10-CM

## 2023-04-11 DIAGNOSIS — H52.13 MYOPIA, BILATERAL: ICD-10-CM

## 2023-04-11 DIAGNOSIS — Z12.5 SCREENING PSA (PROSTATE SPECIFIC ANTIGEN): ICD-10-CM

## 2023-04-11 DIAGNOSIS — N20.0 NEPHROLITHIASIS: ICD-10-CM

## 2023-04-11 DIAGNOSIS — I48.91 ATRIAL FIBRILLATION, UNSPECIFIED TYPE (HCC): Primary | Chronic | ICD-10-CM

## 2023-04-11 DIAGNOSIS — I10 ESSENTIAL HYPERTENSION: ICD-10-CM

## 2023-04-11 DIAGNOSIS — M35.3 PMR (POLYMYALGIA RHEUMATICA) (HCC): ICD-10-CM

## 2023-04-11 DIAGNOSIS — M16.12 PRIMARY OSTEOARTHRITIS OF LEFT HIP: ICD-10-CM

## 2023-04-11 DIAGNOSIS — M25.431 SWELLING OF RIGHT WRIST: ICD-10-CM

## 2023-04-11 DIAGNOSIS — E78.00 HYPERCHOLESTEROLEMIA: ICD-10-CM

## 2023-04-11 DIAGNOSIS — H43.393 FLOATER, VITREOUS, BILATERAL: ICD-10-CM

## 2023-04-11 DIAGNOSIS — L57.0 ACTINIC KERATOSIS: ICD-10-CM

## 2023-04-11 DIAGNOSIS — H25.13 AGE-RELATED NUCLEAR CATARACT OF BOTH EYES: ICD-10-CM

## 2023-04-11 DIAGNOSIS — Z96.642 S/P HIP REPLACEMENT, LEFT: ICD-10-CM

## 2023-04-11 DIAGNOSIS — Z85.828 HISTORY OF NONMELANOMA SKIN CANCER: ICD-10-CM

## 2023-04-11 PROCEDURE — 99213 OFFICE O/P EST LOW 20 MIN: CPT | Performed by: INTERNAL MEDICINE

## 2023-04-11 PROCEDURE — G0439 PPPS, SUBSEQ VISIT: HCPCS | Performed by: INTERNAL MEDICINE

## 2023-04-11 PROCEDURE — 1126F AMNT PAIN NOTED NONE PRSNT: CPT | Performed by: INTERNAL MEDICINE

## 2023-04-24 ENCOUNTER — OFFICE VISIT (OUTPATIENT)
Dept: SURGERY | Facility: CLINIC | Age: 78
End: 2023-04-24

## 2023-04-24 VITALS — DIASTOLIC BLOOD PRESSURE: 76 MMHG | RESPIRATION RATE: 16 BRPM | SYSTOLIC BLOOD PRESSURE: 143 MMHG | HEART RATE: 61 BPM

## 2023-04-24 DIAGNOSIS — M65.841 OTHER SYNOVITIS AND TENOSYNOVITIS, RIGHT HAND: Primary | ICD-10-CM

## 2023-04-24 PROCEDURE — 20550 NJX 1 TENDON SHEATH/LIGAMENT: CPT | Performed by: PLASTIC SURGERY

## 2023-04-24 PROCEDURE — 1126F AMNT PAIN NOTED NONE PRSNT: CPT | Performed by: PLASTIC SURGERY

## 2023-04-24 PROCEDURE — 99204 OFFICE O/P NEW MOD 45 MIN: CPT | Performed by: PLASTIC SURGERY

## 2023-04-24 RX ORDER — AMOXICILLIN 500 MG/1
2000 CAPSULE ORAL ONCE
COMMUNITY
Start: 2022-11-07

## 2023-04-24 RX ORDER — BETAMETHASONE SODIUM PHOSPHATE AND BETAMETHASONE ACETATE 3; 3 MG/ML; MG/ML
9 INJECTION, SUSPENSION INTRA-ARTICULAR; INTRALESIONAL; INTRAMUSCULAR; SOFT TISSUE ONCE
Status: COMPLETED | OUTPATIENT
Start: 2023-04-24 | End: 2023-04-24

## 2023-04-24 NOTE — PROGRESS NOTES
After evaluation by Dr. Jolee Crigler, orders verified for 3 cc celestone injection(s) to R dorsal hand  1.5 cc/6mg Betamethasone and 1.5 cc/10mg 1% Lidocaine drawn up into one syringe for injection(s). Consent obtained and witnessed, and time-out performed by RN. Patient's vitals and pain score pre-and post-injection recorded in vitals section. Patient reclined in exam chair and armboard placed into position for injection(s). Patient tolerated procedure well and band-aid(s) applied. Patient exam chair returned to sitting position and patient left office in satisfactory condition. Patient will call in one month if pain continues or worsens. Patient instructed to call the office with any further questions and/or concerns.

## 2023-05-15 RX ORDER — HYDROCHLOROTHIAZIDE 12.5 MG/1
12.5 TABLET ORAL DAILY
Qty: 90 TABLET | Refills: 3 | Status: SHIPPED | OUTPATIENT
Start: 2023-05-15

## 2023-05-17 ENCOUNTER — OFFICE VISIT (OUTPATIENT)
Dept: SURGERY | Facility: CLINIC | Age: 78
End: 2023-05-17

## 2023-05-17 VITALS — HEART RATE: 69 BPM | SYSTOLIC BLOOD PRESSURE: 153 MMHG | DIASTOLIC BLOOD PRESSURE: 75 MMHG

## 2023-05-17 DIAGNOSIS — N20.0 NEPHROLITHIASIS: Primary | ICD-10-CM

## 2023-05-17 PROCEDURE — 82365 CALCULUS SPECTROSCOPY: CPT | Performed by: UROLOGY

## 2023-05-17 PROCEDURE — 99203 OFFICE O/P NEW LOW 30 MIN: CPT | Performed by: UROLOGY

## 2023-05-30 ENCOUNTER — IMMUNIZATION (OUTPATIENT)
Dept: LAB | Age: 78
End: 2023-05-30
Attending: EMERGENCY MEDICINE
Payer: MEDICARE

## 2023-05-30 ENCOUNTER — LAB ENCOUNTER (OUTPATIENT)
Dept: LAB | Age: 78
End: 2023-05-30
Attending: INTERNAL MEDICINE
Payer: MEDICARE

## 2023-05-30 DIAGNOSIS — Z23 NEED FOR VACCINATION: Primary | ICD-10-CM

## 2023-05-30 DIAGNOSIS — E78.00 HYPERCHOLESTEROLEMIA: ICD-10-CM

## 2023-05-30 DIAGNOSIS — I10 ESSENTIAL HYPERTENSION: ICD-10-CM

## 2023-05-30 DIAGNOSIS — Z12.5 SCREENING PSA (PROSTATE SPECIFIC ANTIGEN): ICD-10-CM

## 2023-05-30 LAB
ALBUMIN SERPL-MCNC: 3.9 G/DL (ref 3.4–5)
ALBUMIN/GLOB SERPL: 1.3 {RATIO} (ref 1–2)
ALP LIVER SERPL-CCNC: 66 U/L
ALT SERPL-CCNC: 34 U/L
ANION GAP SERPL CALC-SCNC: 4 MMOL/L (ref 0–18)
AST SERPL-CCNC: 36 U/L (ref 15–37)
BILIRUB SERPL-MCNC: 0.5 MG/DL (ref 0.1–2)
BUN BLD-MCNC: 23 MG/DL (ref 7–18)
BUN/CREAT SERPL: 23 (ref 10–20)
CALCIUM BLD-MCNC: 9.7 MG/DL (ref 8.5–10.1)
CHLORIDE SERPL-SCNC: 108 MMOL/L (ref 98–112)
CHOLEST SERPL-MCNC: 202 MG/DL (ref ?–200)
CO2 SERPL-SCNC: 26 MMOL/L (ref 21–32)
COMPLEXED PSA SERPL-MCNC: 2.26 NG/ML (ref ?–4)
CREAT BLD-MCNC: 1 MG/DL
FASTING PATIENT LIPID ANSWER: YES
FASTING STATUS PATIENT QL REPORTED: YES
GFR SERPLBLD BASED ON 1.73 SQ M-ARVRAT: 78 ML/MIN/1.73M2 (ref 60–?)
GLOBULIN PLAS-MCNC: 3.1 G/DL (ref 2.8–4.4)
GLUCOSE BLD-MCNC: 105 MG/DL (ref 70–99)
HDLC SERPL-MCNC: 126 MG/DL (ref 40–59)
LDLC SERPL CALC-MCNC: 68 MG/DL (ref ?–100)
NONHDLC SERPL-MCNC: 76 MG/DL (ref ?–130)
OSMOLALITY SERPL CALC.SUM OF ELEC: 290 MOSM/KG (ref 275–295)
POTASSIUM SERPL-SCNC: 3.9 MMOL/L (ref 3.5–5.1)
PROT SERPL-MCNC: 7 G/DL (ref 6.4–8.2)
SODIUM SERPL-SCNC: 138 MMOL/L (ref 136–145)
TRIGL SERPL-MCNC: 42 MG/DL (ref 30–149)
TSI SER-ACNC: 2.13 MIU/ML (ref 0.36–3.74)
VLDLC SERPL CALC-MCNC: 6 MG/DL (ref 0–30)

## 2023-05-30 PROCEDURE — 84443 ASSAY THYROID STIM HORMONE: CPT

## 2023-05-30 PROCEDURE — 0124A SARSCOV2 VAC BVL 30MCG/0.3ML: CPT

## 2023-05-30 PROCEDURE — 80061 LIPID PANEL: CPT

## 2023-05-30 PROCEDURE — 80053 COMPREHEN METABOLIC PANEL: CPT

## 2023-06-07 NOTE — TELEPHONE ENCOUNTER
Refill passed per [facility] protocol. Requested Prescriptions   Pending Prescriptions Disp Refills    atorvastatin 40 MG Oral Tab 135 tablet 1     Sig: Take 1.5 tablets (60 mg total) by mouth nightly.         Cholesterol Medication Protocol Passed - 9/9/2022  1:09 PM        Passed - ALT in past 12 months        Passed - LDL in past 12 months        Passed - Last ALT < 80       Lab Results   Component Value Date    ALT 29 05/19/2022             Passed - Last LDL < 130     Lab Results   Component Value Date    LDL 65 05/19/2022               Passed - In person appointment or virtual visit in the past 12 mos or appointment in next 3 mos       Recent Outpatient Visits              3 months ago Essential hypertension    [facility], [address], MD [place]    Office Visit    4 months ago Bilateral impacted cerumen    [facility], [address], Carlene Merrill MD    Office Visit    9 months ago Essential hypertension    [facility], [address] [place] [address], [place], [place]    Office Visit    10 months ago Need for vaccination    [facility], [address] [place] [address]    Nurse Only    11 months ago Age-related nuclear cataract of both eyes    [facility] [address] [place]    Office Visit     Future Appointments         Provider Department Appt Notes    In 2 weeks Maggie Luke MD [facility] Ophthalmology np ee    In 1 month Juana Marte MD [facility] 4 mo f/u - [facility] message sent to confirm [facility] location - 6/29 NS    In 3 months STATHOPMAURICIOOS, PROCEDURE [address] GI PROCEDURE [email or web address]                   Recent Outpatient Visits              3 months ago Essential hypertension    [facility] [facility], [address], MD [place]    Office Visit    4 months ago Bilateral impacted cerumen    [facility], [address], [place], Alondra Sheffield MD    Office Visit    9 months ago Essential hypertension    [facility], [address] [place] [address], Ursula Camarillo MD    Office Visit    10 months ago Need for vaccination    [facility], [address] [place] [address], [place]    Nurse Only    11 months ago Age-related nuclear cataract of both eyes    [facility] for [address]    Office Visit          Future Appointments         Provider Department Appt Notes    In 2 weeks Ander Deleon MD [facility] Ophthalmology np ee    In 1 month Helga Duran MD [facility], [facility] 4 mo f/u - MyChart message sent to confirm [facility] [place] location - 6/29 NS    In 3 months STATTAY, PROCEDURE [address] GI PROCEDURE [email or web address] [[email or web address]] : 53 yo M presents for initial evaluation of sleep disordered breathing\par Referred by Dr. Adams. \par PMH: HLD, obesity\par WatchPAT HST 5/16/23 showed evidence of severe ADRIANA, pAHI of 33.1/h with severe oxygen desaturation.  Sleep disordered breathing occurred and increased frequency when in the supine position.\par \par Main complaints of nonrestorative sleep, severe snoring and daytime somnolence.\par Unrefreshed upon awakening. \par Morning headaches: denies\par Dry mouth/throat: yes\par Weight trend: stable, BMI 36\par Denies drowsy driving, denies any accidents or near accidents. \par EDS with ESS of 9\par \par Quality Metrics:\par Smoking history: denies\par ESS: 9\par  [Obstructive Sleep Apnea] : obstructive sleep apnea

## 2023-06-19 ENCOUNTER — MED REC SCAN ONLY (OUTPATIENT)
Dept: INTERNAL MEDICINE CLINIC | Facility: CLINIC | Age: 78
End: 2023-06-19

## 2023-06-22 ENCOUNTER — OFFICE VISIT (OUTPATIENT)
Dept: SURGERY | Facility: CLINIC | Age: 78
End: 2023-06-22

## 2023-06-22 VITALS — DIASTOLIC BLOOD PRESSURE: 77 MMHG | RESPIRATION RATE: 16 BRPM | SYSTOLIC BLOOD PRESSURE: 137 MMHG | HEART RATE: 55 BPM

## 2023-06-22 DIAGNOSIS — M65.841 OTHER SYNOVITIS AND TENOSYNOVITIS, RIGHT HAND: Primary | ICD-10-CM

## 2023-06-22 PROCEDURE — 20550 NJX 1 TENDON SHEATH/LIGAMENT: CPT | Performed by: PLASTIC SURGERY

## 2023-06-22 PROCEDURE — 1126F AMNT PAIN NOTED NONE PRSNT: CPT | Performed by: PLASTIC SURGERY

## 2023-06-22 PROCEDURE — 99213 OFFICE O/P EST LOW 20 MIN: CPT | Performed by: PLASTIC SURGERY

## 2023-06-22 RX ORDER — TERBINAFINE HYDROCHLORIDE 250 MG/1
TABLET ORAL
COMMUNITY
Start: 2023-06-16

## 2023-06-22 RX ORDER — TRIAMCINOLONE ACETONIDE 40 MG/ML
40 INJECTION, SUSPENSION INTRA-ARTICULAR; INTRAMUSCULAR ONCE
Status: COMPLETED | OUTPATIENT
Start: 2023-06-22 | End: 2023-06-22

## 2023-06-22 RX ADMIN — TRIAMCINOLONE ACETONIDE 40 MG: 40 INJECTION, SUSPENSION INTRA-ARTICULAR; INTRAMUSCULAR at 13:05:00

## 2023-06-22 NOTE — PROGRESS NOTES
Surgery 1: LECTR  - Date: 06/26/18  - Days Since: 1822    After evaluation by Dr. Kaila Shukla, orders verified for 3cc Kenalog injection to right dorsal hand. 1.5cc/Kenalog-40 and 1.5cc/1% Lidocaine drawn up into one syringe for injection. Consent obtained and witnessed, and time-out performed by RN. Patient's vitals and pain score pre-and post-injection recorded in vitals section. Patient reclined in exam chair and armboard placed into position for injection. Patient tolerated procedure well. Patient exam chair returned to sitting position and patient left office in satisfactory condition. Patient will call in one month if pain continues or worsens. Patient instructed to call the office with any further questions and/or concerns.

## 2023-07-24 ENCOUNTER — TELEPHONE (OUTPATIENT)
Dept: INTERNAL MEDICINE CLINIC | Facility: CLINIC | Age: 78
End: 2023-07-24

## 2023-07-24 DIAGNOSIS — I10 ESSENTIAL HYPERTENSION: Primary | ICD-10-CM

## 2023-07-24 RX ORDER — LOSARTAN POTASSIUM 100 MG/1
100 TABLET ORAL DAILY
Qty: 90 TABLET | Refills: 3 | Status: SHIPPED | OUTPATIENT
Start: 2023-07-24

## 2023-07-24 NOTE — TELEPHONE ENCOUNTER
Refill passed per [facility] protocol.   Hyperthyroid Medications  Protocol Criteria:  Appointment scheduled in the past 6 months or the next 3 months  TSH resulted in the past 6 months  Recent Outpatient Visits              1 month ago Other synovitis and tenosynovitis, right hand    [address], [address], Jeni Figueroa MD    Office Visit    2 months ago Nephrolithiasis    [facility], [address], Helen Martin MD    Office Visit    3 months ago Other synovitis and tenosynovitis, right hand    [facility], [address], Jeni Figueroa MD    Office Visit    3 months ago Atrial fibrillation, unspecified type [facility])    Radha Rodriges MD    Office Visit    9 months ago Essential hypertension    Radha Rodriges MD    Office Visit          Future Appointments         Provider Department Appt Notes    In 2 months Brunilda Welsh MD [address], [address], [address]  [facility]    In 2 months Genaro Prakash MD [address], [facility]     In 3 months [facility]     In 3 months Piyush Oscar MD [address] [facility] 6 month us  results          Lab Results   Component Value Date    TSH 2.130 05/30/2023

## 2023-07-24 NOTE — TELEPHONE ENCOUNTER
Current Outpatient Medications:       losartan 100 MG Oral Tab, Take 1 tablet (100 mg total) by mouth daily. , Disp: 90 tablet, Rfl: 1

## 2023-08-14 ENCOUNTER — OFFICE VISIT (OUTPATIENT)
Dept: SURGERY | Facility: CLINIC | Age: 78
End: 2023-08-14

## 2023-08-14 DIAGNOSIS — M65.841 OTHER SYNOVITIS AND TENOSYNOVITIS, RIGHT HAND: Primary | ICD-10-CM

## 2023-08-14 PROCEDURE — 99213 OFFICE O/P EST LOW 20 MIN: CPT | Performed by: PLASTIC SURGERY

## 2023-08-14 PROCEDURE — 1126F AMNT PAIN NOTED NONE PRSNT: CPT | Performed by: PLASTIC SURGERY

## 2023-08-14 NOTE — PROGRESS NOTES
Here for follow up right dorsal hand synovitis  Pt states he has no pain  Previous injection, 4/24/23 and 6/22/23 helpful  concerned of LMF unable to straighten    He is \"much better\"  His LMF extends better    He has no pain    Injections in April and June. Here for reevaluation    No synovitis present  Full range of motion  Slight  lag LMF, but good extension against resistance    Good extension against resistance EDC x4, EIP, EDQ    Call if recurrence of synovitis      +++++++++++++++++++++++++++++++++++++++++      MEDICAL DECISION MAKING    PROBLEMS      LOW    (number / complexity)          Stable chronic illness    DATA         STRAIGHTFORWARD    (amount / complexity)           MANAGEMENT RISK  LOW    (complications/ morbidity)       Decision regarding injection.   No injection necessary                  MDM LEVEL    LOW

## 2023-09-28 ENCOUNTER — OFFICE VISIT (OUTPATIENT)
Dept: OPHTHALMOLOGY | Facility: CLINIC | Age: 78
End: 2023-09-28

## 2023-09-28 DIAGNOSIS — H25.13 AGE-RELATED NUCLEAR CATARACT OF BOTH EYES: Primary | ICD-10-CM

## 2023-09-28 DIAGNOSIS — H43.393 FLOATER, VITREOUS, BILATERAL: ICD-10-CM

## 2023-09-28 PROCEDURE — 92014 COMPRE OPH EXAM EST PT 1/>: CPT | Performed by: OPHTHALMOLOGY

## 2023-09-28 NOTE — PROGRESS NOTES
Orin Pozo is a 70year old male. HPI:     HPI    Pt here today for a complete eye exam. Pt states he has trouble reading smaller print and feels his cataracts may be getting worse. Last edited by Damian Ewing O.T. on 2023 10:41 AM.        Patient History:  Past Medical History:   Diagnosis Date    Actinic keratosis     Acute carpal tunnel syndrome of left wrist 2018    Atrial fibrillation ([facility] [address]     BCC (basal cell carcinoma of skin)     Calculus of kidney     Cancer ([facility] [address]     Encounter for observation of suspected disorder     High blood pressure     High cholesterol     Numbness and tingling in left hand     Osteoarthritis     Skin cancer     Visual impairment     glasses       Surgical History: Orin Pozo has a past surgical history that includes colonoscopy; wrist arthroscop,release xvers lig (Left, 2018) (Left endoscopic carpal tunnel release); carpal tunnel release; other (squamous cell removal on the leg); other (Left) (has retained needle from stepping on needle ); total hip replacement (Left, 2021); total knee replacement (Right, 2021); and colonoscopy (N/A, 2022) (Procedure: COLONOSCOPY;  Surgeon: Ras Garcia MD;  Location: [facility]).     Family History   Problem Relation Age of Onset    Cancer Mother         Cancer-breast    Cancer Father         leukemia    Diabetes Neg     Glaucoma Neg     Macular degeneration Neg        Social History:   Social History     Socioeconomic History    Marital status:    Tobacco Use    Smoking status: Former     Packs/day: 0.50     Years: 14.00     Additional pack years: 0.00     Total pack years: 7.00     Types: Cigarettes     Quit date: 1980     Years since quittin.4    Smokeless tobacco: Never   Vaping Use    Vaping Use: Never used   Substance and Sexual Activity    Alcohol use: Yes     Comment: couple drinks weekly    Drug use: No    Sexual activity: Yes   Other Topics Concern Caffeine Concern Yes     Comment: Tea, coffee, 2 cups daily    Left Handed No    Right Handed Yes    Currently spends a great deal of time in the sun No    History of tanning No    Hx of Spending [address] of Time in Sun No    Bad sunburns in the past No    Tanning Salons in the Past No    Hx of Radiation Treatments No       Medications:  Current Outpatient Medications   Medication Sig Dispense Refill    losartan 100 MG Oral Tab Take 1 tablet (100 mg total) by mouth daily. 90 tablet 3    terbinafine 250 MG Oral Tab TAKE 1 TABLET BY MOUTH EVERY DAY FOR 1 WEEK. REPEAT MONTHLY FOR A TOTAL OF 5 MONTHS      hydroCHLOROthiazide 12.5 MG Oral Tab Take 1 tablet (12.5 mg total) by mouth daily. 90 tablet 3    atorvastatin 40 MG Oral Tab Take 1.5 tablets (60 mg total) by mouth nightly. 135 tablet 3    Ascorbic Acid (VITAMIN C) 100 MG Oral Tab Take 1 tablet (100 mg total) by mouth daily. aspirin 325 MG Oral Tab Take 1 tablet (325 mg total) by mouth daily. Vitamin D3, Cholecalciferol, (VITAMIN D3) 125 MCG (5000 UT) Oral Cap Take 1 capsule (5,000 Units total) by mouth daily.  Pt takes 5000 units every other day      amoxicillin 500 MG Oral Cap Take 4 capsules (2,000 mg total) by mouth one time. 1 HOUR PRIOR TO PROCEDURE (Patient not taking: Reported on 8/14/2023)         Allergies:  No Known Allergies    ROS:       PHYSICAL EXAM:     Base Eye Exam       Visual Acuity (Snellen - Linear)         Right Left    Dist cc 20/25 20/25 +2    Near cc 20/20- 20/20-      Correction: Glasses              Tonometry ([facility]care, 10:53 AM)         Right Left    Pressure 16 14              Pupils         Pupils    Right PERRL    Left PERRL              Visual Fields         Left Right     Full Full              Extraocular Movement         Right Left     Full, Ortho Full, Ortho              Neuro/Psych       Oriented x3: Yes    Mood/Affect: Normal              Dilation       Both eyes: 1.0% Mydriacyl and 2.5% Alverto Synephrine @ 10:53 AM Slit Lamp and Fundus Exam       External Exam         Right Left    External Normal Normal              Slit Lamp Exam         Right Left    Lids/Lashes Dermatochalasis, Meibomian gland dysfunction Dermatochalasis, Meibomian gland dysfunction    Conjunctiva/Sclera Normal Normal    Cornea Trace Guttata Trace Guttata    Anterior Chamber Deep and quiet Deep and quiet    Iris Normal Normal    Lens 2-3+ Nuclear sclerosis 2-3+ Nuclear sclerosis, Trace Cortical cataract    Vitreous Vitreous floaters Vitreous floaters              Fundus Exam         Right Left    Disc Good rim, Temporal crescent Good rim, Temporal crescent    C/D Ratio 0.4 0.4    Macula Normal Normal    Vessels Normal Normal    Periphery Normal Normal                  Refraction       Wearing Rx         Sphere Cylinder Axis Add    Right -5.50 +1.00 180 +2.50    Left -4.00 +1.25 170 +2.50      Type: Progressive bifocal              Manifest Refraction    Pt declines refraction, pt would like to know if he is ready for cataract surgery                     ASSESSMENT/PLAN:     Diagnoses and Plan:     Age-related nuclear cataract of both eyes  Discussed moderate cataracts in both eyes that are affecting vision but are not surgical at this time. Continue with same glasses   Reassured patient that other than cataracts, eyes look very healthy; there is no evidence of glaucoma or macular degeneration in either eye. Floater, vitreous, bilateral   There is no evidence of retinal pathology. All signs and symptoms of retinal detachment/tears explained in detail. Patient instructed to call the office if they experience increase in floaters, increase in flashes of light, loss of vision or curtain or veil effect. Will see patient in 1 year for a complete exam    No orders of the defined types were placed in this encounter.       Meds This Visit:  Requested Prescriptions      No prescriptions requested or ordered in this encounter        Follow up instructions:  Return in about 1 year (around 9/28/2024) for complete exam.    9/28/2023  Scribed by: Shun Valentin MD

## 2023-09-28 NOTE — ASSESSMENT & PLAN NOTE
Discussed moderate cataracts in both eyes that are affecting vision but are not surgical at this time. Continue with same glasses   Reassured patient that other than cataracts, eyes look very healthy; there is no evidence of glaucoma or macular degeneration in either eye. Cheiloplasty (Complex) Text: A decision was made to reconstruct the defect with a  cheiloplasty.  The defect was undermined extensively.  Additional obicularis oris muscle was excised with a 15 blade scalpel.  The defect was converted into a full thickness wedge to facilite a better cosmetic result.  Small vessels were then tied off with 5-0 monocyrl. The obicularis oris, superficial fascia, adipose and dermis were then reapproximated.  After the deeper layers were approximated the epidermis was reapproximated with particular care given to realign the vermilion border.

## 2023-09-28 NOTE — PATIENT INSTRUCTIONS
Age-related nuclear cataract of both eyes  Discussed moderate cataracts in both eyes that are affecting vision but are not surgical at this time. Continue with same glasses   Reassured patient that other than cataracts, eyes look very healthy; there is no evidence of glaucoma or macular degeneration in either eye. Floater, vitreous, bilateral   There is no evidence of retinal pathology. All signs and symptoms of retinal detachment/tears explained in detail. Patient instructed to call the office if they experience increase in floaters, increase in flashes of light, loss of vision or curtain or veil effect.      Will see patient in 1 year for a complete exam

## 2023-10-04 ENCOUNTER — IMMUNIZATION (OUTPATIENT)
Dept: LAB | Age: 78
End: 2023-10-04
Attending: EMERGENCY MEDICINE
Payer: MEDICARE

## 2023-10-04 DIAGNOSIS — Z23 NEED FOR VACCINATION: Primary | ICD-10-CM

## 2023-10-04 PROCEDURE — 90662 IIV NO PRSV INCREASED AG IM: CPT

## 2023-10-04 PROCEDURE — 90471 IMMUNIZATION ADMIN: CPT

## 2023-10-04 PROCEDURE — 90480 ADMN SARSCOV2 VAC 1/ONLY CMP: CPT

## 2023-10-12 ENCOUNTER — OFFICE VISIT (OUTPATIENT)
Facility: CLINIC | Age: 78
End: 2023-10-12

## 2023-10-12 VITALS
SYSTOLIC BLOOD PRESSURE: 120 MMHG | HEART RATE: 70 BPM | BODY MASS INDEX: 26 KG/M2 | RESPIRATION RATE: 14 BRPM | OXYGEN SATURATION: 98 % | DIASTOLIC BLOOD PRESSURE: 72 MMHG | WEIGHT: 195 LBS

## 2023-10-12 DIAGNOSIS — E78.00 HYPERCHOLESTEROLEMIA: ICD-10-CM

## 2023-10-12 DIAGNOSIS — I10 ESSENTIAL HYPERTENSION: Primary | ICD-10-CM

## 2023-10-12 PROBLEM — N20.0 NEPHROLITHIASIS: Status: ACTIVE | Noted: 2023-10-12

## 2023-10-12 PROCEDURE — 99214 OFFICE O/P EST MOD 30 MIN: CPT | Performed by: INTERNAL MEDICINE

## 2023-10-12 PROCEDURE — 1126F AMNT PAIN NOTED NONE PRSNT: CPT | Performed by: INTERNAL MEDICINE

## 2023-11-10 ENCOUNTER — HOSPITAL ENCOUNTER (OUTPATIENT)
Dept: ULTRASOUND IMAGING | Age: 78
Discharge: HOME | End: 2023-11-10
Attending: UROLOGY
Payer: MEDICARE

## 2023-11-10 DIAGNOSIS — N20.0 NEPHROLITHIASIS: ICD-10-CM

## 2023-11-10 PROCEDURE — 76770 US EXAM ABDO BACK WALL COMP: CPT | Performed by: UROLOGY

## 2023-11-17 ENCOUNTER — OFFICE VISIT (OUTPATIENT)
Dept: SURGERY | Facility: CLINIC | Age: 78
End: 2023-11-17
Payer: MEDICARE

## 2023-11-17 DIAGNOSIS — N20.0 NEPHROLITHIASIS: Primary | ICD-10-CM

## 2023-11-17 PROCEDURE — 99213 OFFICE O/P EST LOW 20 MIN: CPT | Performed by: UROLOGY

## 2023-11-17 NOTE — PROGRESS NOTES
Nelly Tabor MD  Department of Urology  [facility]    T: [phone]  F: [phone]    To: Ena Esteban MD   [address] [place] [address]    Re: Gila Lovelace   MRN: [id number]  : 1945    Dear Ena Esteban MD,    Today I had the pleasure of seeing Gila Lovelace in my clinic. As you know, Mr. Mazin Conteh is a pleasant 70year old year old male who I am seeing for nephrolithiasis. Patient was last seen in this department on 2023. Briefly, patient presented to the emergency room with left-sided flank pain and CT scan was limited given hip artifact. Several days later he did pass a kidney stone which he brought to our clinic. CT scan does demonstrate 3 additional nonobstructing stones. He was last seen by me in May 2023. Plan at last visit was to obtain a renal bladder ultrasound and return to clinic after the renal bladder ultrasound. I did offer him observation, ESWL or ureteroscopy. His most recent renal bladder ultrasound shows no hydroureteronephrosis but does show bilateral nonobstructing nephrolithiasis.            PAST MEDICAL HISTORY:  Past Medical History:   Diagnosis Date    Actinic keratosis     Acute carpal tunnel syndrome of left wrist 2018    Atrial fibrillation (HCC)     BCC (basal cell carcinoma of skin)     Calculus of kidney     Cancer ([facility] [address]     Encounter for observation of suspected disorder     High blood pressure     High cholesterol     Numbness and tingling in left hand     Osteoarthritis     Skin cancer     Visual impairment     glasses        PAST SURGICAL HISTORY:  Past Surgical History:   Procedure Laterality Date    CARPAL TUNNEL RELEASE      COLONOSCOPY      COLONOSCOPY N/A 2022    Procedure: COLONOSCOPY;  Surgeon: Pacheco Patiño MD;  Location: [address] [place] [address] ENDOSCOPY    OTHER      squamous cell removal on the leg    OTHER Left     has retained needle from stepping on needle     TOTAL HIP REPLACEMENT Left 2021    TOTAL KNEE REPLACEMENT Right 2021    WRIST ARTHROSCOP,RELEASE XVERS LIG Left 2018    Left endoscopic carpal tunnel release         ALLERGIES:  No Known Allergies      MEDICATIONS:  Current Outpatient Medications   Medication Instructions    amoxicillin (AMOXIL) 2,000 mg, Oral, Once, 1 HOUR PRIOR TO PROCEDURE    aspirin 325 mg, Oral, Daily    atorvastatin (LIPITOR) 60 mg, Oral, Nightly    cholecalciferol (VITAMIN D3) 5,000 Units, Oral, Daily, Pt takes 5000 units every other day     hydroCHLOROthiazide (HYDRODIURIL) 12.5 mg, Oral, Daily    losartan (COZAAR) 100 mg, Oral, Daily    terbinafine 250 MG Oral Tab TAKE 1 TABLET BY MOUTH EVERY DAY FOR 1 WEEK. REPEAT MONTHLY FOR A TOTAL OF 5 MONTHS    vitamin C 100 mg, Oral, Daily        FAMILY HISTORY:  Family History   Problem Relation Age of Onset    Cancer Mother         Cancer-breast    Cancer Father         leukemia    Diabetes Neg     Glaucoma Neg     Macular degeneration Neg         SOCIAL HISTORY:  Social History     Socioeconomic History    Marital status:    Tobacco Use    Smoking status: Former     Packs/day: 0.50     Years: 14.00     Additional pack years: 0.00     Total pack years: 7.00     Types: Cigarettes     Quit date: 1980     Years since quittin.5    Smokeless tobacco: Never   Vaping Use    Vaping Use: Never used   Substance and Sexual Activity    Alcohol use: Yes     Comment: couple drinks weekly    Drug use: No    Sexual activity: Yes   Other Topics Concern    Caffeine Concern Yes     Comment: Tea, coffee, 2 cups daily    Left Handed No    Right Handed Yes    Currently spends a great deal of time in the sun No    History of tanning No    Hx of Spending [place] of Time in Sun No    Bad sunburns in the past No    Tanning Salons in the Past No    Hx of Radiation Treatments No          PHYSICAL EXAMINATION:  There were no vitals filed for this visit.   CONSTITUTIONAL: No apparent distress, cooperative and communicative  NEUROLOGIC: Alert and oriented   HEAD: Normocephalic, atraumatic   EYES: Sclera non-icteric   ENT: Hearing intact, moist mucous membranes   NECK: No obvious goiter or masses   RESPIRATORY: Normal respiratory effort, Nonlabored breathing on room air  SKIN: No evident rashes   ABDOMEN: Soft, nontender, nondistended, no rebound tenderness, no guarding, no masses    REVIEW OF SYSTEMS:    A comprehensive 10-point review of systems was completed. Pertinent positives and negatives are noted in the the HPI. LABORATORY DATA:  Prostate Specific Antigen Screen  <=4.00 ng/mL 2.26        Glucose  70 - 99 mg/dL 105 High    Sodium  136 - 145 mmol/L 138   Potassium  3.5 - 5.1 mmol/L 3.9   Chloride  98 - 112 mmol/L 108   CO2  21.0 - 32.0 mmol/L 26.0   Anion Gap  0 - 18 mmol/L 4   BUN  7 - 18 mg/dL 23 High    Creatinine  0.70 - 1.30 mg/dL 1.00   BUN/CREA Ratio  10.0 - 20.0 23.0 High    Calcium, Total  8.5 - 10.1 mg/dL 9.7   Calculated Osmolality  275 - 295 mOsm/kg 290   eGFR-Cr  >=60 mL/min/1.73m2 78   ALT  16 - 61 U/L 34   AST  15 - 37 U/L 36   Alkaline Phosphatase  45 - 117 U/L 66   Bilirubin, Total  0.1 - 2.0 mg/dL 0.5   Total Protein  6.4 - 8.2 g/dL 7.0   Albumin  3.4 - 5.0 g/dL 3.9   Globulin  2.8 - 4.4 g/dL 3.1   A/G Ratio  1.0 - 2.0 1.3         Component  Ref Range & Units 4/7/23 10:54 AM   ISTAT Sodium  136 - 145 mmol/L 142   ISTAT BUN  7 - 18 mg/dL 24 High    ISTAT Potassium  3.6 - 5.1 mmol/L 4.1   ISTAT Chloride  98 - 112 mmol/L 105   ISTAT Ionized Calcium  1.12 - 1.32 mmol/L 1.13   ISTAT Hematocrit  37 - 53 % 45   ISTAT Glucose  70 - 99 mg/dL 100 High    ISTAT TCO2  21 - 32 mmol/L 27   ISTAT Creatinine  0.70 - 1.30 mg/dL 1.10   Comment: This test may exhibit falsely elevated results when a sample is collected from a patient who is presently taking Hydroxyurea.  If patient is consuming Hydroxyurea, i-STAT creatinine analysis should be considered inaccurate and a sample should be referred to the [facility] for analysis, if clinically indicated. eGFR-Cr  >=60 mL/min/1.73m2 70        Source-Stone Comment   Comment: Not provided   Color-Stone Brown   Size-Stone  mm 4x5   Comment: Single piece received. Weight-Stone  mg 65   Composition-Stone Comment   Comment: Percentage (Represents the % composition)   CaOx Monohydrate  % 80   CaOx Dihydrate  % 20   Photo Stone Comment   Comment: Photograph will follow under a separate cover   LC Contact info Comment   Comment: Physician questions regarding Calculi Analysis contact  [employer] at: [phone]. Stone analysis note Comment   Comment: Calculi report will follow via computer, mail or   delivery. Stone Disclaimer: Comment        IMAGING REVIEW:  PROCEDURE: US KIDNEY/BLADDER (GBO=13585)      COMPARISON: [place] ABDOMEN + PELVIS KIDNEYSTONE 2D RNDR (NO IV NO ORAL) (CPT=741, 4/07/2023, 10:47 AM.       INDICATIONS: Nephrolithiasis. TECHNIQUE: Ultrasound examination was performed to visualize the kidneys and bladder. FINDINGS:   RIGHT KIDNEY: Measures 10.1 cm in length. There is normal renal cortical echogenicity. No hydronephrosis is appreciated. A 0.7 x 0.5 cm suspected echogenic, shadowing calculus is evident. Anechoic cystic lesions are present measuring 2.6 x 2.5 x 2.7 cm   in the interpolar region, and 2.2 x 1.2 x 1.9 cm, 3.1 x 3.0 x 2.7 cm, and 3.9 x 3.2 x 3.4 cm in the lower pole. LEFT KIDNEY: Measures 11.3 cm in length and demonstrates normal cortical architecture and parenchymal echogenicity. There is no evidence of hydronephrosis. There are echogenic, shadowing calculi measuring 1.20.6 x 1.1 cm and 0.6 x 0.5 x 0.9 cm. BLADDER: Negative for visible wall thickening, mass, or calculus. On color Doppler interrogation, bilateral ureteral jets are visualized. The bladder volume is 214 mL. OTHER: The visualized portion of the prostate measures 4.8 x 5.6 x 5.3 cm.  A prominent median prostatic lobe indents the base of the urinary bladder. CONCLUSION:   1. Bilateral nephrolithiasis without sonographic evidence of hydronephrosis. 2. Simple-appearing right renal cysts. 3. Prostatomegaly with probable chronic outlet obstruction. DICTATED BY (CST): Katie Salinas MD ON 11/10/2023 AT 3:42 PM       FINALIZED BY (CST): Katie Salinas MD ON 11/10/2023 AT 3:47 PM        Narrative   PROCEDURE:   CT ABDOMEN + PELVIS KIDNEYSTONE 2D RNDR (NO IV NO ORAL) (CPT=74176)     COMPARISON: None. INDICATIONS: Lt lower quadrant abdominal pain. TECHNIQUE: Multidetector CT images of the abdomen and pelvis were obtained without intravenous contrast material. No oral contrast was ingested. Automated exposure control for dose reduction was used. Adjustment of the mA and/or kV was done based on the  patient's size. Iterative reconstruction technique for dose reduction was employed. FINDINGS:  COMMENT: Evaluation of the vasculature and of the abdominal viscera for the presence of intraparenchymal pathology is suboptimal in the absence of contrast infusion. LUNG BASES: The heart is normal in size; partially imaged coronary artery calcification. There is dependent subsegmental atelectasis bilaterally. KIDNEYS:   No hydronephrosis. Multiple bilateral nonobstructing renal calculi, the largest of which measure up to 4-5 mm in the lower pole of the left kidney. Multiple simple-appearing right renal cysts. No perinephric fluid collection. No  significant ureteral dilation or definite obstructing urinary tract calculus. URINARY BLADDER: No visible calculus or focal wall thickening. LIVER: No enlargement, atrophy, abnormal density, or significant focal lesion is identified within the parameters of this noncontrast study. BILIARY: The gallbladder is present. PANCREAS: Negative unenhanced appearance for fluid collection, ductal dilatation, or atrophy. SPLEEN: No enlargement. ADRENALS:   No defined mass or abnormal enlargement. GI/MESENTERY: There is no evidence of bowel obstruction. Tiny retrocardiac hiatal hernia. Please note that segments of the colon are incompletely distended and suboptimally evaluated, mild-moderate colonic fecal burden. Candidate small normal  appendix in the right lower quadrant (series 5, image 57). PELVIC NODES: No lymphadenopathy. PELVIC ORGANS: Mildly enlarged 5.7 cm diameter prostate gland. Deep pelvic calcifications likely represent phleboliths. VASCULATURE:   No aneurysm is detected. Moderate atherosclerosis. RETROPERITONEUM: No mass or lymphadenopathy is apparent. BONES:   Left hip arthroplasty noted; resultant streak artifacts limit evaluation of the pelvic structures. Severe right hip osteoarthritis. Demineralization. Mild dextroscoliosis of the lumbar spine with multilevel lumbar spine degeneration. Scattered sclerotic foci likely reflect bone islands. ABDOMINAL WALL: Small bilateral fat containing inguinal hernias. OTHER: No free air or fluid is seen in the abdomen or pelvis. Impression   CONCLUSION:  1. Bilateral nonobstructing renal calculi, which measure up to 4-5 mm in the lower pole of the left kidney. No hydronephrosis/obstructive uropathy. Pelvic calcifications likely represent incidental phleboliths. Please note that streak artifacts from  left hip arthroplasty result in limited evaluation of the pelvic structures. 2. No other acute intra-abdominal finding. 3. Prostatomegaly. 4. Findings that can be seen in the setting of constipation. 5. Tiny retrocardiac hiatal hernia. 6. Coronary and peripheral atherosclerosis. 7. Severe right hip osteoarthritis.   8. Lesser incidental findings as above.        elm-remote     Dictated by (CST): Britatny Hull MD on 4/07/2023 at 11:18 AM      Finalized by (CST): Brittany Hull MD on 4/07/2023 at 11:26 AM       OTHER RELEVANT DATA:   none     IMPRESSION: Left nonobstructive stones, offered observation, ESWL or ureteroscopy. Patient opted for observation. Patient was counseled on stone prevention methods including hydration (until urine is clear), limiting salt and red meat/meat intake, eating a normal calcium diet, and drinking lemon with water. We also talked about reasons to go to the emergency room - namely acute flank pain associated with fevers, chills, nausea or vomiting. We discussed ureteroscopy including how it is performed and associated risks. I reviewed risks including bleeding, infection, damage to surrounding structures (urethra, bladder, ureter, kidney), inability to treat the stone and need for stent alone, need for additional/multiple procedures, need for prolonged stent, need for nephrostomy tube, stent colic/discomfort (extreme flank pain, bladder discomfort/spasms, urinary urgency and frequency, hematuria), ureteral stricture, ureteral avulsion requiring open surgery, sepsis, anesthesia complications (cardiorespiratory complications). I also counseled patient that NSAIDs and blood thinning agents need to be stopped appropriately prior to surgery. Discussed return precautions including fevers, chills, nausea, vomiting, intractable pain. Stent colic including flank pain, urgency, frequency and blood in the urine is common. Patient agreed with the plan and understood the above. ESWL risks were reviewed including pain with passage of stones, steinstrasse, fevers, chills, bleeding, sepsis formation of new stones from lack of passage of stones. PLAN:  CT noncontrast in 1 year  RTC in 1 year    Thank you for referring this very pleasant patient to my clinic. If you have any questions or concerns, please do not hesitate to contact me.     Sincerely,  Rashad Mayo MD    30 minutes were spent on this patient at this visit obtaining a history, reviewing medical records, developing a treatment plan, counseling and discussing treatment strategy with patient, coordination of care and documentation. The [facility] makes medical notes available to patients in the interest of transparency. However, please be advised that this is a medical document. It is intended as a peer to peer communication. It is written in medical language and may contain abbreviations or verbiage that are technical and unfamiliar. It may appear blunt or direct. Medical documents are intended to carry relevant information, facts as evident, and the clinical opinion of the practitioner.

## 2024-03-05 NOTE — TELEPHONE ENCOUNTER
Current Outpatient Medications:       atorvastatin 40 MG Oral Tab, Take 1.5 tablets (60 mg total) by mouth nightly., Disp: 135 tablet, Rfl: 3

## 2024-03-06 RX ORDER — ATORVASTATIN CALCIUM 40 MG/1
60 TABLET, FILM COATED ORAL NIGHTLY
Qty: 135 TABLET | Refills: 3 | Status: SHIPPED | OUTPATIENT
Start: 2024-03-06

## 2024-03-06 NOTE — TELEPHONE ENCOUNTER
Refill passed per [facility] protocol.  Requested Prescriptions   Pending Prescriptions Disp Refills    atorvastatin 40 MG Oral Tab 135 tablet 3     Sig: Take 1.5 tablets (60 mg total) by mouth nightly.       Cholesterol Medication Protocol Passed - 3/5/2024 10:55 AM        Passed - ALT < 80     Lab Results   Component Value Date    ALT 34 05/30/2023             Passed - ALT resulted within past year        Passed - Lipid panel within past 12 months     Lab Results   Component Value Date    CHOLEST 202 (H) 05/30/2023    TRIG 42 05/30/2023     (H) 05/30/2023    LDL 68 05/30/2023    VLDL 6 05/30/2023    NONHDLC 76 05/30/2023             Passed - In person appointment or virtual visit in the past 12 mos or appointment in next 3 mos     Recent Outpatient Visits              3 months ago Nephrolithiasis    [facility] Maxine Diggs MD    Office Visit    4 months ago Essential hypertension    [facility] Jorge Vivas MD    Office Visit    5 months ago Age-related nuclear cataract of both eyes    [facility] Robles Paulson MD    Office Visit    6 months ago Other synovitis and tenosynovitis, right hand    [facility] Allen Nair MD    Office Visit    8 months ago Other synovitis and tenosynovitis, right hand    [facility] Allen Nair MD    Office Visit          Future Appointments         Provider Department Appt Notes    In 5 days Allen Nair MD [facility] FU RH SYNOVITIS, LOV 8/2023    In 1 month Jorge Vivas MD [facility] medicare physicla    In 7 months Robles Paulson MD [facility] ep ee    In 8 months [facility] CT [address] [facility] CT - [place] Kidney stones    In 8 months Maxine Diggs MD [facility] 1 year follow up                  Recent Outpatient Visits              3 months ago Nephrolithiasis    [facility] Maxine Diggs MD    Office Visit    4 months ago Essential hypertension    [facility] Jorge Vivas MD    Office Visit    5 months ago Age-related nuclear cataract of both eyes    [facility] Robles Paulson MD    Office Visit    6 months ago Other synovitis and tenosynovitis, right hand    [facility] EbonyiciAllen Rodrigues MD    Office Visit    8 months ago Other synovitis and tenosynovitis, right hand    [facility] Ebonyici VAllen MD    Office Visit          Future Appointments         Provider Department Appt Notes    In 5 days Allen Nair MD [facility] FU RH SYNOVITIS, LOV 8/2023    In 1 month Jorge Vivas MD [facility] medicare physicla    In 7 months Robles Paulson MD [facility] ep ee    In 8 months [facility] CT [address] CT - [place] Kidney stones    In 8 months Maxine Diggs MD [facility] 1 year follow up

## 2024-03-11 ENCOUNTER — OFFICE VISIT (OUTPATIENT)
Dept: SURGERY | Facility: CLINIC | Age: 79
End: 2024-03-11
Payer: MEDICARE

## 2024-03-11 VITALS — RESPIRATION RATE: 19 BRPM | DIASTOLIC BLOOD PRESSURE: 83 MMHG | SYSTOLIC BLOOD PRESSURE: 127 MMHG | HEART RATE: 91 BPM

## 2024-03-11 DIAGNOSIS — M65.841 OTHER SYNOVITIS AND TENOSYNOVITIS, RIGHT HAND: Primary | ICD-10-CM

## 2024-03-11 RX ORDER — GARLIC EXTRACT 500 MG
1 CAPSULE ORAL DAILY
COMMUNITY

## 2024-03-11 RX ORDER — BETAMETHASONE SODIUM PHOSPHATE AND BETAMETHASONE ACETATE 3; 3 MG/ML; MG/ML
9 INJECTION, SUSPENSION INTRA-ARTICULAR; INTRALESIONAL; INTRAMUSCULAR; SOFT TISSUE ONCE
Status: COMPLETED | OUTPATIENT
Start: 2024-03-11 | End: 2024-03-11

## 2024-03-11 RX ADMIN — BETAMETHASONE SODIUM PHOSPHATE AND BETAMETHASONE ACETATE 9 MG: 3; 3 INJECTION, SUSPENSION INTRA-ARTICULAR; INTRALESIONAL; INTRAMUSCULAR; SOFT TISSUE at 14:10:00

## 2024-03-11 NOTE — PROGRESS NOTES
After evaluation by Dr. Mabry, orders verified for 2cc celestone injection to right dorsal hand  1cc/6mg Betamethasone and 1cc/10mg 1% Lidocaine drawn up into one syringe for injection  Consent obtained and witnessed, and time-out performed by RN.  Pt's vitals and pain score pre-and post-injection recorded in vitals section.  Pt reclined in exam chair and armboard placed into position for injection(s).  Pt tolerated procedure well and band-aid(s) applied.  Pt exam chair returned to sitting position and pt left office in satisfactory condition.  Pt will call in one month if pain continues or worsens.  Pt instructed to call the office w/any further questions and/or concerns.

## 2024-03-12 ENCOUNTER — IMMUNIZATION (OUTPATIENT)
Dept: LAB | Age: 79
End: 2024-03-12
Attending: EMERGENCY MEDICINE
Payer: MEDICARE

## 2024-03-12 DIAGNOSIS — Z23 NEED FOR VACCINATION: Primary | ICD-10-CM

## 2024-03-12 PROCEDURE — 90480 ADMN SARSCOV2 VAC 1/ONLY CMP: CPT

## 2024-04-04 NOTE — TELEPHONE ENCOUNTER
Patient called in stating that he received only half of his medication. I went through the medication chart with the patient but he was not able to advise me of the name of the medication. Patient states he is running low.  Please advise
Patient stated that [facility] told him losartan is also on back order. He was told to see if he could get it from his local pharmacy. He currently has 6 days worth of medication left.  Spoke with patient's preferred  Home	[place] and they stated they
Statement Selected

## 2024-04-17 ENCOUNTER — LAB ENCOUNTER (OUTPATIENT)
Dept: LAB | Age: 79
End: 2024-04-17
Attending: INTERNAL MEDICINE
Payer: MEDICARE

## 2024-04-17 ENCOUNTER — OFFICE VISIT (OUTPATIENT)
Dept: INTERNAL MEDICINE CLINIC | Facility: CLINIC | Age: 79
End: 2024-04-17
Payer: MEDICARE

## 2024-04-17 VITALS
DIASTOLIC BLOOD PRESSURE: 87 MMHG | HEART RATE: 92 BPM | HEIGHT: 71.7 IN | SYSTOLIC BLOOD PRESSURE: 125 MMHG | BODY MASS INDEX: 26.98 KG/M2 | WEIGHT: 197 LBS

## 2024-04-17 DIAGNOSIS — M16.12 PRIMARY OSTEOARTHRITIS OF LEFT HIP: ICD-10-CM

## 2024-04-17 DIAGNOSIS — E78.00 HYPERCHOLESTEROLEMIA: ICD-10-CM

## 2024-04-17 DIAGNOSIS — M35.3 PMR (POLYMYALGIA RHEUMATICA) (HCC): ICD-10-CM

## 2024-04-17 DIAGNOSIS — N20.0 NEPHROLITHIASIS: ICD-10-CM

## 2024-04-17 DIAGNOSIS — H25.13 AGE-RELATED NUCLEAR CATARACT OF BOTH EYES: ICD-10-CM

## 2024-04-17 DIAGNOSIS — I10 ESSENTIAL HYPERTENSION: ICD-10-CM

## 2024-04-17 DIAGNOSIS — L57.0 ACTINIC KERATOSIS: ICD-10-CM

## 2024-04-17 DIAGNOSIS — Z96.642 S/P HIP REPLACEMENT, LEFT: ICD-10-CM

## 2024-04-17 DIAGNOSIS — H43.393 FLOATER, VITREOUS, BILATERAL: ICD-10-CM

## 2024-04-17 DIAGNOSIS — M17.11 PRIMARY OSTEOARTHRITIS OF RIGHT KNEE: ICD-10-CM

## 2024-04-17 DIAGNOSIS — H52.13 MYOPIA, BILATERAL: ICD-10-CM

## 2024-04-17 DIAGNOSIS — I48.91 ATRIAL FIBRILLATION, UNSPECIFIED TYPE (HCC): Primary | Chronic | ICD-10-CM

## 2024-04-17 DIAGNOSIS — Z85.828 HISTORY OF NONMELANOMA SKIN CANCER: ICD-10-CM

## 2024-04-17 LAB
ALBUMIN SERPL-MCNC: 4.6 G/DL (ref 3.2–4.8)
ALBUMIN/GLOB SERPL: 1.7 {RATIO} (ref 1–2)
ALP LIVER SERPL-CCNC: 74 U/L
ALT SERPL-CCNC: 28 U/L
ANION GAP SERPL CALC-SCNC: 9 MMOL/L (ref 0–18)
AST SERPL-CCNC: 28 U/L (ref ?–34)
BILIRUB SERPL-MCNC: 0.9 MG/DL (ref 0.2–1.1)
BUN BLD-MCNC: 25 MG/DL (ref 9–23)
BUN/CREAT SERPL: 23.1 (ref 10–20)
CALCIUM BLD-MCNC: 10.1 MG/DL (ref 8.7–10.4)
CHLORIDE SERPL-SCNC: 106 MMOL/L (ref 98–112)
CHOLEST SERPL-MCNC: 191 MG/DL (ref ?–200)
CO2 SERPL-SCNC: 27 MMOL/L (ref 21–32)
CREAT BLD-MCNC: 1.08 MG/DL
DEPRECATED RDW RBC AUTO: 44.6 FL (ref 35.1–46.3)
EGFRCR SERPLBLD CKD-EPI 2021: 71 ML/MIN/1.73M2 (ref 60–?)
ERYTHROCYTE [DISTWIDTH] IN BLOOD BY AUTOMATED COUNT: 12.1 % (ref 11–15)
FASTING PATIENT LIPID ANSWER: YES
FASTING STATUS PATIENT QL REPORTED: YES
GLOBULIN PLAS-MCNC: 2.7 G/DL (ref 2.8–4.4)
GLUCOSE BLD-MCNC: 108 MG/DL (ref 70–99)
HCT VFR BLD AUTO: 43.9 %
HDLC SERPL-MCNC: 103 MG/DL (ref 40–59)
HGB BLD-MCNC: 14.8 G/DL
LDLC SERPL CALC-MCNC: 80 MG/DL (ref ?–100)
MCH RBC QN AUTO: 33.7 PG (ref 26–34)
MCHC RBC AUTO-ENTMCNC: 33.7 G/DL (ref 31–37)
MCV RBC AUTO: 100 FL
NONHDLC SERPL-MCNC: 88 MG/DL (ref ?–130)
OSMOLALITY SERPL CALC.SUM OF ELEC: 299 MOSM/KG (ref 275–295)
PLATELET # BLD AUTO: 156 10(3)UL (ref 150–450)
POTASSIUM SERPL-SCNC: 4.6 MMOL/L (ref 3.5–5.1)
PROT SERPL-MCNC: 7.3 G/DL (ref 5.7–8.2)
RBC # BLD AUTO: 4.39 X10(6)UL
SODIUM SERPL-SCNC: 142 MMOL/L (ref 136–145)
TRIGL SERPL-MCNC: 37 MG/DL (ref 30–149)
TSI SER-ACNC: 1.64 MIU/ML (ref 0.55–4.78)
VLDLC SERPL CALC-MCNC: 6 MG/DL (ref 0–30)
WBC # BLD AUTO: 4.3 X10(3) UL (ref 4–11)

## 2024-04-17 PROCEDURE — 84443 ASSAY THYROID STIM HORMONE: CPT

## 2024-04-17 PROCEDURE — 80053 COMPREHEN METABOLIC PANEL: CPT

## 2024-04-17 PROCEDURE — G0439 PPPS, SUBSEQ VISIT: HCPCS | Performed by: INTERNAL MEDICINE

## 2024-04-17 PROCEDURE — 85027 COMPLETE CBC AUTOMATED: CPT

## 2024-04-17 PROCEDURE — 80061 LIPID PANEL: CPT

## 2024-04-17 PROCEDURE — 36415 COLL VENOUS BLD VENIPUNCTURE: CPT

## 2024-04-17 NOTE — PROGRESS NOTES
Subjective:   Robles Chan is a 78 year old male who presents for a Medicare Subsequent Annual Wellness visit (Pt already had Initial Annual Wellness) and scheduled follow up of multiple significant but stable problems.   78-year-old gentleman here for Medicare annual wellness visit.  He report that he is doing well.  Still has arthralgia on and off.  Apart from that, he has no other complaints.  No falls reported.  No abuse no depression reported.    History/Other:   Fall Risk Assessment:   He has been screened for Falls and is low risk.      Cognitive Assessment:   He had a completely normal cognitive assessment - see flowsheet entries     Functional Ability/Status:   Robles Chan has a completely normal functional assessment. See flowsheet for details.      Depression Screening (PHQ-2/PHQ-9): PHQ-2 SCORE: 0  , done 4/13/2024        <5 minutes spent screening and counseling for depression    Advanced Directives:   He does have a Living Will but we do NOT have it on file in Epic.    He does have a POA but we do NOT have it on file in Epic.    Discussed Advance Care Planning with patient (and family/surrogate if present). Standard forms made available to patient in After Visit Summary.      Patient Active Problem List   Diagnosis    Myopia, bilateral    Actinic keratosis    History of nonmelanoma skin cancer    Essential hypertension    Hypercholesterolemia    Age-related nuclear cataract of both eyes    PMR (polymyalgia rheumatica) (HCC)    Primary osteoarthritis of left hip    S/P hip replacement, left    Primary osteoarthritis of right knee    Atrial fibrillation (HCC)    Floater, vitreous, bilateral    Nephrolithiasis     Allergies:  He has No Known Allergies.    Current Medications:  Outpatient Medications Marked as Taking for the 4/17/24 encounter (Office Visit) with Jorge Vivas MD   Medication Sig    acidophilus-pectin Oral Cap Take 1 capsule by mouth daily.    atorvastatin 40 MG Oral Tab Take  1.5 tablets (60 mg total) by mouth nightly.    TURMERIC OR Take by mouth.    losartan 100 MG Oral Tab Take 1 tablet (100 mg total) by mouth daily.    hydroCHLOROthiazide 12.5 MG Oral Tab Take 1 tablet (12.5 mg total) by mouth daily.    Ascorbic Acid (VITAMIN C) 100 MG Oral Tab Take 1 tablet (100 mg total) by mouth daily.    aspirin 325 MG Oral Tab Take 1 tablet (325 mg total) by mouth daily.    Vitamin D3, Cholecalciferol, (VITAMIN D3) 125 MCG (5000 UT) Oral Cap Take 1 capsule (5,000 Units total) by mouth daily. Pt takes 5000 units every other day       Medical History:  He  has a past medical history of Actinic keratosis, Acute carpal tunnel syndrome of left wrist (06/04/2018), Atrial fibrillation (HCC), BCC (basal cell carcinoma of skin), Calculus of kidney, Cancer (HCC), Encounter for observation of suspected disorder, High blood pressure, High cholesterol, Numbness and tingling in left hand, Osteoarthritis, Skin cancer, and Visual impairment.  Surgical History:  He  has a past surgical history that includes colonoscopy; wrist arthroscop,release xvers lig (Left, 06/26/2018); carpal tunnel release; other; other (Left); total hip replacement (Left, 02/04/2021); total knee replacement (Right, 08/13/2021); and colonoscopy (N/A, 12/20/2022).   Family History:  His family history includes Cancer in his father and mother.  Social History:  He  reports that he quit smoking about 43 years ago. His smoking use included cigarettes. He started smoking about 57 years ago. He has a 7 pack-year smoking history. He has never used smokeless tobacco. He reports current alcohol use. He reports that he does not use drugs.    Tobacco:  He smoked tobacco in the past but quit greater than 12 months ago.  Social History     Tobacco Use   Smoking Status Former    Current packs/day: 0.00    Average packs/day: 0.5 packs/day for 14.0 years (7.0 ttl pk-yrs)    Types: Cigarettes    Start date: 5/8/1966    Quit date: 5/8/1980    Years since  quittin.9   Smokeless Tobacco Never        CAGE Alcohol Screen:   CAGE screening score of 0 on 2024, showing low risk of alcohol abuse.      Patient Care Team:  Jorge Vivas MD as PCP - General (Internal Medicine)    Review of Systems   Constitutional:  Negative for activity change, appetite change and fever.   HENT:  Negative for congestion and voice change.    Respiratory:  Negative for cough and shortness of breath.    Cardiovascular:  Negative for chest pain.   Gastrointestinal:  Negative for abdominal distention, abdominal pain and vomiting.   Genitourinary:  Negative for hematuria.   Skin:  Negative for wound.   Psychiatric/Behavioral:  Negative for behavioral problems.          Objective:   Physical Exam  Constitutional:       Appearance: Normal appearance.   HENT:      Head: Normocephalic.   Eyes:      Conjunctiva/sclera: Conjunctivae normal.   Cardiovascular:      Rate and Rhythm: Normal rate and regular rhythm.      Heart sounds: Normal heart sounds. No murmur heard.  Pulmonary:      Effort: Pulmonary effort is normal.      Breath sounds: Normal breath sounds. No rhonchi or rales.   Abdominal:      General: Bowel sounds are normal.      Palpations: Abdomen is soft.      Tenderness: There is no abdominal tenderness.   Musculoskeletal:      Cervical back: Neck supple.      Right lower leg: No edema.      Left lower leg: No edema.   Skin:     General: Skin is warm and dry.   Neurological:      General: No focal deficit present.      Mental Status: He is alert and oriented to person, place, and time. Mental status is at baseline.   Psychiatric:         Mood and Affect: Mood normal.         Behavior: Behavior normal.         /87 (BP Location: Right arm, Patient Position: Sitting, Cuff Size: adult)   Pulse 92   Ht 5' 11.7\" (1.821 m)   Wt 197 lb (89.4 kg)   BMI 26.94 kg/m²  Estimated body mass index is 26.94 kg/m² as calculated from the following:    Height as of this encounter: 5' 11.7\"  (1.821 m).    Weight as of this encounter: 197 lb (89.4 kg).    Medicare Hearing Assessment:   Hearing Screening    Time taken: 4/17/2024  9:28 AM  Screening Method: Questionnaire  I have a problem hearing over the telephone: No I have trouble following the conversations when two or more people are talking at the same time: No   I have trouble understanding things on the TV: No I have to strain to understand conversations: Sometimes   I have to worry about missing the telephone ring or doorbell: No I have trouble hearing conversations in a noisy background such as a crowded room or restaurant: Sometimes   I get confused about where sounds come from: No I misunderstand some words in a sentence and need to ask people to repeat themselves: No   I especially have trouble understanding the speech of women and children: No I have trouble understanding the speaker in a large room such as at a meeting or place of Taoism: No   Many people I talk to seem to mumble (or don't speak clearly): No People get annoyed because I misunderstand what they say: No   I misunderstand what others are saying and make inappropriate responses: No I avoid social activities because I cannot hear well and fear I will reply improperly: No   Family members and friends have told me they think I may have hearing loss: No                   Assessment & Plan:   Robles Chan is a 78 year old male who presents for a Medicare Assessment.     1. Atrial fibrillation, unspecified type (HCC) (Primary) stable with no recurrence  Overview:  After TKR - was seen by Dr Goncalves- only on ASA  2. PMR (polymyalgia rheumatica) (HCC) stable  3. Essential hypertension well-controlled  -     CBC, Platelet; No Differential; Future; Expected date: 04/17/2024  -     Comp Metabolic Panel (14); Future; Expected date: 04/17/2024  -     Lipid Panel; Future; Expected date: 04/17/2024  -     TSH W Reflex To Free T4; Future; Expected date: 04/17/2024  4. Hypercholesterolemia  continue statins  -     CBC, Platelet; No Differential; Future; Expected date: 04/17/2024  -     Comp Metabolic Panel (14); Future; Expected date: 04/17/2024  -     Lipid Panel; Future; Expected date: 04/17/2024  -     TSH W Reflex To Free T4; Future; Expected date: 04/17/2024  5. Primary osteoarthritis of left hip stable  6. S/P hip replacement, left stable  7. Primary osteoarthritis of right knee stable  Overview:  S/p right knee  8. Nephrolithiasis  Overview:  Follows up with Dr Diggs  9. History of nonmelanoma skin cancer follows with dermatology  10. Myopia, bilateral  And  11. Age-related nuclear cataract of both eyes  And  12. Floater, vitreous, bilateral follows with ophthalmology  13. Actinic keratosis-follow[facility] dermatology [facility] of [facility]    Last colonoscopy was in December 2022.-PSA reviewed.  Up-to-date on vaccines.  Fall prevention discussed.  Medication compliance discussed.  I will see him back in 6 months    The patient indicates understanding of these issues and agrees to the plan.  Reinforced healthy diet, lifestyle, and exercise.      No follow-ups on file.     Jorge Vivas MD, 4/17/2024     Supplementary Documentation:   General Health:  In the past six months, have you lost more than 10 pounds without trying?: 2 - No  Has your appetite been poor?: No  Type of Diet: Balanced  How does the patient maintain a good energy level?: Daily Walks  How would you describe your daily physical activity?: Moderate  How would you describe your current health state?: Good  How do you maintain positive mental well-being?: Social Interaction;Visiting Friends  On a scale of 0 to 10, with 0 being no pain and 10 being severe pain, what is your pain level?: 2 - (Mild)  In the past six months, have you experienced urine leakage?: 0-No  At any time do you feel concerned for the safety/well-being of yourself and/or your children, in your home or elsewhere?: No  Have you had any immunizations at another  office such as Influenza, Hepatitis B, Tetanus, or Pneumococcal?: No        [facility] [place] SCREENING SCHEDULE   Tests on this list are recommended by your physician but may not be covered, or covered at this frequency, by your insurer.   Please check with your insurance carrier before scheduling to verify coverage.   PREVENTATIVE SERVICES FREQUENCY &  COVERAGE DETAILS LAST COMPLETION DATE   Diabetes Screening    Fasting Blood Sugar / Glucose    One screening every 12 months if never tested or if previously tested but not diagnosed with pre-diabetes   One screening every 6 months if diagnosed with pre-diabetes Lab Results   Component Value Date     (H) 05/30/2023        Cardiovascular Disease Screening    Lipid Panel  Cholesterol  Lipoprotein (HDL)  Triglycerides Covered every 5 years for all Medicare beneficiaries without apparent signs or symptoms of cardiovascular disease Lab Results   Component Value Date    CHOLEST 202 (H) 05/30/2023     (H) 05/30/2023    LDL 68 05/30/2023    TRIG 42 05/30/2023         Electrocardiogram (EKG)   Covered if needed at Welcome to Medicare, and non-screening if indicated for medical reasons 08/14/2021      Ultrasound Screening for Abdominal Aortic Aneurysm (AAA) Covered once in a lifetime for one of the following risk factors    Men who are 65-75 years old and have ever smoked    Anyone with a family history -     Colorectal Cancer Screening  Covered for ages 50-85; only need ONE of the following:    Colonoscopy   Covered every 10 years    Covered every 2 years if patient is at high risk or previous colonoscopy was abnormal 12/20/2022    Health Maintenance   Topic Date Due    Colorectal Cancer Screening  Discontinued       Flexible Sigmoidoscopy   Covered every 4 years -    Fecal Occult Blood Test Covered annually -   Prostate Cancer Screening    Prostate-Specific Antigen (PSA) Annually Lab Results   Component Value Date    PSA 1.6 10/15/2018     There are no  preventive care reminders to display for this patient.   Immunizations    Influenza Covered once per flu season  Please get every year 10/04/2023  No recommendations at this time    Pneumococcal Each vaccine ([id number] & [id number]) covered once after 65 Prevnar 13: 11/03/2020    Aagnvvghi27: 10/21/2021     No recommendations at this time    Hepatitis B One screening covered for patients with certain risk factors   -  No recommendations at this time    Tetanus Toxoid Not covered by Medicare Part B unless medically necessary (cut with metal); may be covered with your pharmacy prescription benefits -    Tetanus, Diptheria and Pertusis TD and TDaP Not covered by Medicare Part B -  No recommendations at this time    Zoster Not covered by Medicare Part B; may be covered with your pharmacy  prescription benefits 10/29/2012  No recommendations at this time     Annual Monitoring of Persistent Medications (ACE/ARB, digoxin diuretics, anticonvulsants)    Potassium Annually Lab Results   Component Value Date    K 3.9 05/30/2023         Creatinine   Annually Lab Results   Component Value Date    CREATSERUM 1.00 05/30/2023         BUN Annually Lab Results   Component Value Date    BUN 23 (H) 05/30/2023       Drug Serum Conc Annually No results found for: \"DIGOXIN\", \"DIG\", \"VALP\"

## 2024-04-22 NOTE — TELEPHONE ENCOUNTER
Current Outpatient Medications:       hydroCHLOROthiazide 12.5 MG Oral Tab, Take 1 tablet (12.5 mg total) by mouth daily., Disp: 90 tablet, Rfl: 3

## 2024-05-06 RX ORDER — HYDROCHLOROTHIAZIDE 12.5 MG/1
12.5 TABLET ORAL DAILY
Qty: 90 TABLET | Refills: 3 | Status: SHIPPED | OUTPATIENT
Start: 2024-05-06

## 2024-05-06 NOTE — TELEPHONE ENCOUNTER
Refill passed per [facility] protocol.  Requested Prescriptions   Pending Prescriptions Disp Refills    hydroCHLOROthiazide 12.5 MG Oral Tab 90 tablet 3     Sig: Take 1 tablet (12.5 mg total) by mouth daily.       Hypertension Medications Protocol Passed - 5/6/2024  9:33 AM        Passed - CMP or BMP in past 12 months        Passed - Last BP reading less than 140/90     BP Readings from Last 1 Encounters:   04/17/24 125/87               Passed - In person appointment or virtual visit in the past 12 mos or appointment in next 3 mos     Recent Outpatient Visits              2 weeks ago Atrial fibrillation, unspecified type (HCC)    [place] Jorge Vivas MD    Office Visit    1 month ago Other synovitis and tenosynovitis, right hand    [facility] Allen Nair MD    Office Visit    5 months ago Nephrolithiasis    [facility] Maxine Diggs MD    Office Visit    6 months ago Essential hypertension    [facility] Jorge Vivas MD    Office Visit    7 months ago Age-related nuclear cataract of both eyes    [facility] Robles Paulson MD    Office Visit          Future Appointments         Provider Department Appt Notes    In 5 months Robles Paulson MD [facility] ep ee    In 5 months Jorge Vivas MD [place] 6 m    In 6 months [facility] [address] CT - [place] [facility]    In 6 months Maxine Diggs MD [facility] 1 year follow up                    Passed - EGFRCR or GFRNAA > 50     GFR Evaluation  EGFRCR: 71 , resulted on 4/17/2024             Recent Outpatient Visits              2 weeks ago Atrial fibrillation, unspecified type  (HCC)    [facility] Jorge Vivas MD    Office Visit    1 month ago Other synovitis and tenosynovitis, right hand    [facility] Allen Nair MD    Office Visit    5 months ago Nephrolithiasis    [facility] Maxine Diggs MD    Office Visit    6 months ago Essential hypertension    [facility] Jorge Vivas MD    Office Visit    7 months ago Age-related nuclear cataract of both eyes    [facility] Robles Paulson MD    Office Visit          Future Appointments         Provider Department Appt Notes    In 5 months Robles Paulson MD [facility] ep ee    In 5 months Jorge Vivas MD [facility] 6 m    In 6 months [facility] [address] CT - [place] [facility]    In 6 months Maxine Diggs MD [facility] 1 year follow up

## 2024-05-30 ENCOUNTER — HOSPITAL ENCOUNTER (EMERGENCY)
Age: 79
Discharge: LEFT BEFORE DISCHARGE | End: 2024-05-30
Attending: STUDENT IN AN ORGANIZED HEALTH CARE EDUCATION/TRAINING PROGRAM

## 2024-05-30 VITALS
SYSTOLIC BLOOD PRESSURE: 117 MMHG | OXYGEN SATURATION: 100 % | WEIGHT: 197.75 LBS | TEMPERATURE: 97.5 F | HEART RATE: 81 BPM | DIASTOLIC BLOOD PRESSURE: 85 MMHG | RESPIRATION RATE: 19 BRPM

## 2024-05-30 DIAGNOSIS — R55 SYNCOPE, UNSPECIFIED SYNCOPE TYPE: Primary | ICD-10-CM

## 2024-05-30 DIAGNOSIS — I48.91 NEW ONSET A-FIB  (CMD): ICD-10-CM

## 2024-05-30 LAB
ALBUMIN SERPL-MCNC: 3.3 G/DL (ref 3.6–5.1)
ALBUMIN/GLOB SERPL: 1.1 {RATIO} (ref 1–2.4)
ALP SERPL-CCNC: 72 UNITS/L (ref 45–117)
ALT SERPL-CCNC: 26 UNITS/L
ANION GAP SERPL CALC-SCNC: 10 MMOL/L (ref 7–19)
AST SERPL-CCNC: 24 UNITS/L
BASOPHILS # BLD: 0 K/MCL (ref 0–0.3)
BASOPHILS NFR BLD: 0 %
BILIRUB SERPL-MCNC: 0.4 MG/DL (ref 0.2–1)
BUN SERPL-MCNC: 30 MG/DL (ref 6–20)
BUN/CREAT SERPL: 25 (ref 7–25)
CALCIUM SERPL-MCNC: 9 MG/DL (ref 8.4–10.2)
CHLORIDE SERPL-SCNC: 111 MMOL/L (ref 97–110)
CO2 SERPL-SCNC: 25 MMOL/L (ref 21–32)
CREAT SERPL-MCNC: 1.19 MG/DL (ref 0.67–1.17)
DEPRECATED RDW RBC: 45 FL (ref 39–50)
EGFRCR SERPLBLD CKD-EPI 2021: 63 ML/MIN/{1.73_M2}
EOSINOPHIL # BLD: 0.1 K/MCL (ref 0–0.5)
EOSINOPHIL NFR BLD: 1 %
ERYTHROCYTE [DISTWIDTH] IN BLOOD: 12 % (ref 11–15)
FASTING DURATION TIME PATIENT: ABNORMAL H
GLOBULIN SER-MCNC: 3.1 G/DL (ref 2–4)
GLUCOSE SERPL-MCNC: 138 MG/DL (ref 70–99)
HCT VFR BLD CALC: 41 % (ref 39–51)
HGB BLD-MCNC: 13.4 G/DL (ref 13–17)
IMM GRANULOCYTES # BLD AUTO: 0 K/MCL (ref 0–0.2)
IMM GRANULOCYTES # BLD: 1 %
LYMPHOCYTES # BLD: 2.1 K/MCL (ref 1–4)
LYMPHOCYTES NFR BLD: 31 %
MAGNESIUM SERPL-MCNC: 2.2 MG/DL (ref 1.7–2.4)
MCH RBC QN AUTO: 33.2 PG (ref 26–34)
MCHC RBC AUTO-ENTMCNC: 32.7 G/DL (ref 32–36.5)
MCV RBC AUTO: 101.5 FL (ref 78–100)
MONOCYTES # BLD: 0.6 K/MCL (ref 0.3–0.9)
MONOCYTES NFR BLD: 9 %
NEUTROPHILS # BLD: 3.8 K/MCL (ref 1.8–7.7)
NEUTROPHILS NFR BLD: 58 %
NRBC BLD MANUAL-RTO: 0 /100 WBC
NT-PROBNP SERPL-MCNC: 590 PG/ML
PLATELET # BLD AUTO: 233 K/MCL (ref 140–450)
POTASSIUM SERPL-SCNC: 3.7 MMOL/L (ref 3.4–5.1)
PROT SERPL-MCNC: 6.4 G/DL (ref 6.4–8.2)
RBC # BLD: 4.04 MIL/MCL (ref 4.5–5.9)
SODIUM SERPL-SCNC: 142 MMOL/L (ref 135–145)
TROPONIN I SERPL DL<=0.01 NG/ML-MCNC: 7 NG/L
WBC # BLD: 6.7 K/MCL (ref 4.2–11)

## 2024-05-30 PROCEDURE — 93005 ELECTROCARDIOGRAM TRACING: CPT | Performed by: EMERGENCY MEDICINE

## 2024-05-30 PROCEDURE — 83880 ASSAY OF NATRIURETIC PEPTIDE: CPT | Performed by: STUDENT IN AN ORGANIZED HEALTH CARE EDUCATION/TRAINING PROGRAM

## 2024-05-30 PROCEDURE — 80053 COMPREHEN METABOLIC PANEL: CPT | Performed by: STUDENT IN AN ORGANIZED HEALTH CARE EDUCATION/TRAINING PROGRAM

## 2024-05-30 PROCEDURE — 10002807 HB RX 258: Performed by: STUDENT IN AN ORGANIZED HEALTH CARE EDUCATION/TRAINING PROGRAM

## 2024-05-30 PROCEDURE — 83735 ASSAY OF MAGNESIUM: CPT | Performed by: STUDENT IN AN ORGANIZED HEALTH CARE EDUCATION/TRAINING PROGRAM

## 2024-05-30 PROCEDURE — 85025 COMPLETE CBC W/AUTO DIFF WBC: CPT | Performed by: STUDENT IN AN ORGANIZED HEALTH CARE EDUCATION/TRAINING PROGRAM

## 2024-05-30 PROCEDURE — 99284 EMERGENCY DEPT VISIT MOD MDM: CPT

## 2024-05-30 PROCEDURE — 36415 COLL VENOUS BLD VENIPUNCTURE: CPT

## 2024-05-30 PROCEDURE — 84484 ASSAY OF TROPONIN QUANT: CPT | Performed by: STUDENT IN AN ORGANIZED HEALTH CARE EDUCATION/TRAINING PROGRAM

## 2024-05-30 RX ADMIN — SODIUM CHLORIDE 1000 ML: 9 INJECTION, SOLUTION INTRAVENOUS at 21:41

## 2024-05-30 ASSESSMENT — PAIN SCALES - GENERAL: PAINLEVEL_OUTOF10: 0

## 2024-05-31 LAB
QRS-INTERVAL (MSEC): 110
QT-INTERVAL (MSEC): 388
QTC: 448
R AXIS (DEGREES): 68
RAINBOW EXTRA TUBES HOLD SPECIMEN: NORMAL
REPORT TEXT: NORMAL
T AXIS (DEGREES): -13
VENTRICULAR RATE EKG/MIN (BPM): 80

## 2024-06-21 ENCOUNTER — MED REC SCAN ONLY (OUTPATIENT)
Dept: INTERNAL MEDICINE CLINIC | Facility: CLINIC | Age: 79
End: 2024-06-21

## 2024-07-11 ENCOUNTER — OFFICE VISIT (OUTPATIENT)
Dept: SURGERY | Facility: CLINIC | Age: 79
End: 2024-07-11
Payer: MEDICARE

## 2024-07-11 DIAGNOSIS — M72.0 DUPUYTREN'S CONTRACTURE: ICD-10-CM

## 2024-07-11 DIAGNOSIS — M65.841 OTHER SYNOVITIS AND TENOSYNOVITIS, RIGHT HAND: Primary | ICD-10-CM

## 2024-07-11 PROCEDURE — 99214 OFFICE O/P EST MOD 30 MIN: CPT | Performed by: PLASTIC SURGERY

## 2024-07-11 RX ORDER — RIVAROXABAN 20 MG/1
20 TABLET, FILM COATED ORAL DAILY
COMMUNITY
Start: 2024-06-19

## 2024-07-11 RX ORDER — AMIODARONE HYDROCHLORIDE 200 MG/1
200 TABLET ORAL 2 TIMES DAILY
COMMUNITY
Start: 2024-06-18

## 2024-07-11 NOTE — PROGRESS NOTES
Surgery 1: LECTR  - Date: 06/26/18  - Days Since: 2207    Pt here for FU R tenosynovitis.  LOV 3/11/24, injection.  Previous injections on 6/2023, 4/2023.  Pt stated most recent injection was effective for a few months but is now having morning stiffness in R hand.  Pt has pain when gripping large water bottle.  Pt had recent ER visit on 5/30/24 s/p syncope, has history of a-fib, pt refused to be admitted, followed up w/ provider.  Pt prescribed Xarelto, 20 mg, prescribed by Dr Goncalves for a-fib.    Pt mentions LMF, LSF palmar cording.  Had LECTR and plays guitar, having difficulty.        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  Synovitis is often difficult to cure.  It may require multiple treatments over a long period of time, and symptoms may not go away completely.  Even with satisfactory treatment, synovitis may recur.  Questions were answered and the patient wishes to proceed with treatment.     Non-operative therapy may help, but surgery may still be necessary.    Synovectomy - Surgery to remove the synovium is necessary.  This may decrease the chances of tendon rupture.  I explained the procedure of synovectomy at length, including post-operative course and risks as indicated on the Surgical Request Form.  I discussed post-operative  restrictions.  Therapy will be necessary post-operatively.  Even with surgery, recurrence can occur and tendon rupture can occur.    POST-OPERATIVE PROTOCOL:  We discussed post-operative restrictions at length.  It is critical to maintain the splint post-operatively and to comply with post-operative instructions.  The splint must be carefully cared for, must remain dry, and cannot be removed under any circumstance.  Consistent elevation of the hand above heart level is critical.  Therapy will be necessary post-operatively.  Failure to comply with post-operative instructions, including therapy, will have significant impact on the final result, and could lead to permanent pain, stiffness, weakness, and loss of function.    Even with satisfactory healing, the hand/digit may not regain normal ROM or normal function.    RISKS:     Bleeding  Infection  Scar  Pain  Stiffness  Weakness  Loss of function  Anesthesia risks        He is recently on Xarelto for atrial fibrillation.  We will perform surgery when he is able to be off Xarelto for several days.      DUPUYTREN'S  left      We discussed what Dupuytren's is, including treatment options.  Questions were answered and the patient wishes to proceed with treatment.     The Dupuytren's  left is asymptomatic / minimally symptomatic.  Surgery is not indicated at this point.  If it becomes symptomatic (eg, pain, progression, contracture, functional problems), I would recommend palmar fasciectomy.      +++++++++++++++++++++++++++++++++++++++++++++++++    MEDICAL DECISION MAKING    PROBLEMS      MODERATE    (number / complexity)          Chronic illness with exacerbation    DATA         STRAIGHTFORWARD    (amount / complexity)           MANAGEMENT RISK  HIGH    (complications/ morbidity)       Major surgery with risk factors                  MDM LEVEL    MODERATE

## 2024-07-17 ENCOUNTER — LAB ENCOUNTER (OUTPATIENT)
Dept: LAB | Age: 79
End: 2024-07-17
Attending: INTERNAL MEDICINE
Payer: MEDICARE

## 2024-07-17 DIAGNOSIS — Z01.818 PRE-OP TESTING: ICD-10-CM

## 2024-07-17 LAB
ANION GAP SERPL CALC-SCNC: 6 MMOL/L (ref 0–18)
BUN BLD-MCNC: 19 MG/DL (ref 9–23)
BUN/CREAT SERPL: 16.1 (ref 10–20)
CALCIUM BLD-MCNC: 9.7 MG/DL (ref 8.7–10.4)
CHLORIDE SERPL-SCNC: 111 MMOL/L (ref 98–112)
CO2 SERPL-SCNC: 25 MMOL/L (ref 21–32)
CREAT BLD-MCNC: 1.18 MG/DL
EGFRCR SERPLBLD CKD-EPI 2021: 64 ML/MIN/1.73M2 (ref 60–?)
FASTING STATUS PATIENT QL REPORTED: NO
GLUCOSE BLD-MCNC: 115 MG/DL (ref 70–99)
OSMOLALITY SERPL CALC.SUM OF ELEC: 297 MOSM/KG (ref 275–295)
POTASSIUM SERPL-SCNC: 4.1 MMOL/L (ref 3.5–5.1)
SODIUM SERPL-SCNC: 142 MMOL/L (ref 136–145)

## 2024-07-17 PROCEDURE — 36415 COLL VENOUS BLD VENIPUNCTURE: CPT

## 2024-07-17 PROCEDURE — 80048 BASIC METABOLIC PNL TOTAL CA: CPT

## 2024-07-24 ENCOUNTER — HOSPITAL ENCOUNTER (OUTPATIENT)
Dept: INTERVENTIONAL RADIOLOGY/VASCULAR | Facility: HOSPITAL | Age: 79
Discharge: HOME | End: 2024-07-24
Attending: INTERNAL MEDICINE | Admitting: INTERNAL MEDICINE
Payer: MEDICARE

## 2024-07-24 VITALS
BODY MASS INDEX: 26.32 KG/M2 | TEMPERATURE: 98 F | RESPIRATION RATE: 16 BRPM | WEIGHT: 188 LBS | HEIGHT: 71 IN | DIASTOLIC BLOOD PRESSURE: 78 MMHG | SYSTOLIC BLOOD PRESSURE: 132 MMHG | OXYGEN SATURATION: 96 % | HEART RATE: 59 BPM

## 2024-07-24 DIAGNOSIS — I48.91 NEW ONSET A-FIB (HCC): ICD-10-CM

## 2024-07-24 DIAGNOSIS — Z01.818 PRE-OP TESTING: Primary | ICD-10-CM

## 2024-07-24 LAB
ATRIAL RATE: 63 BPM
P AXIS: 19 DEGREES
P-R INTERVAL: 252 MS
Q-T INTERVAL: 440 MS
QRS DURATION: 104 MS
QTC CALCULATION (BEZET): 450 MS
R AXIS: 57 DEGREES
T AXIS: 15 DEGREES
VENTRICULAR RATE: 63 BPM

## 2024-07-24 PROCEDURE — 5A2204Z RESTORATION OF CARDIAC RHYTHM, SINGLE: ICD-10-PCS | Performed by: INTERNAL MEDICINE

## 2024-07-24 PROCEDURE — 36415 COLL VENOUS BLD VENIPUNCTURE: CPT

## 2024-07-24 PROCEDURE — 92960 CARDIOVERSION ELECTRIC EXT: CPT | Performed by: INTERNAL MEDICINE

## 2024-07-24 PROCEDURE — 93010 ELECTROCARDIOGRAM REPORT: CPT | Performed by: INTERNAL MEDICINE

## 2024-07-24 PROCEDURE — 93005 ELECTROCARDIOGRAM TRACING: CPT

## 2024-07-24 RX ORDER — METHOHEXITAL IN WATER/PF 100MG/10ML
SYRINGE (ML) INTRAVENOUS
Status: COMPLETED
Start: 2024-07-24 | End: 2024-07-24

## 2024-07-24 RX ORDER — METHOHEXITAL IN WATER/PF 100MG/10ML
60 SYRINGE (ML) INTRAVENOUS ONCE
Status: COMPLETED | OUTPATIENT
Start: 2024-07-24 | End: 2024-07-24

## 2024-07-24 RX ORDER — SODIUM CHLORIDE 9 MG/ML
INJECTION, SOLUTION INTRAVENOUS CONTINUOUS
Status: DISCONTINUED | OUTPATIENT
Start: 2024-07-24 | End: 2024-07-24

## 2024-07-24 RX ADMIN — SODIUM CHLORIDE: 9 INJECTION, SOLUTION INTRAVENOUS at 08:15:00

## 2024-07-24 NOTE — IVS NOTE
DISCHARGE NOTE     Pt is able to sit up and ambulate without difficulty.   Pt voided. Offered fluids and food but refused.   IV access removed  Instruction provided, patient/family verbalizes understanding.   Dr. Goncalves spoke with family post procedure.     Pt discharge via wheelchair to [facility]     Follow up Appointment: as scheduled

## 2024-07-24 NOTE — DISCHARGE INSTRUCTIONS
Discharge Instruction for Cardioversion    Your healthcare provider performed a procedure called cardioversion. Your healthcare provider used a controlled electric shock or a medicine to briefly stop all electrical activity in your heart. This helped restore your heart’s normal rhythm. Here are some instructions to follow while you recover.    Home Care  Because cardioversion typically requires sedation, you won't be able to drive home. You will need a ride. Wait at least 24 hours before driving a car or operating heavy machinery after receiving sedating medicines.  Don’t be alarmed if the skin on your chest is irritated or feels like it is sunburned. Your healthcare provider may prescribe a soothing lotion to relieve this discomfort. These minor symptoms will go away in a few days.  Ask your healthcare provider about medicines to keep your heart rhythm steady.  If you were prescribed medicine, take it as instructed by your healthcare provider. Don’t skip doses or take double doses. Cardioversion requires blood thinners for at least 4 weeks to prevent a delayed risk of stroke when treating atrial fibrillation or atrial flutter. Be sure you discuss which medicine you are taking to prevent stroke. Ask when you need to have your medicine levels checked, and whether you may be able to stop taking it in the future or whether it is recommended that you take it for life. Some of these blood-thinning medicines will have the dose adjusted, and interact with other medicines or foods. Your healthcare team will give you full instructions on what to watch out for. Report bleeding or symptoms of stroke immediately to your healthcare team and seek emergency medical attention.  Learn to take your own pulse. Keep a record of your results. Ask your healthcare provider when you should seek emergency medical attention. He or she will tell you which pulse rate reading is dangerous.   Keep in mind this procedure may need to be repeated  if the abnormal heart rhythm returns. After the procedure, your healthcare provider will tell you if the treatment worked or if you will need further treatments or medication.    Follow Up Care   Make a follow-up appointment, or as directed.    Call 911  Call 911 right away if you have:  Chest pain  Shortness of breath  Loss of vision, speech, or strength or coordination in any body part    When to call your Healthcare Provider  Call your healthcare provider right away if you:  Feel faint, dizzy, or lightheaded  Have chest pain with increased activity  Have irregular heartbeat or fast pulse

## 2024-07-25 DIAGNOSIS — I10 ESSENTIAL HYPERTENSION: ICD-10-CM

## 2024-07-25 NOTE — PROCEDURES
[facility]    PATIENT'S NAME: ARMAND NEFF   ATTENDING PHYSICIAN: Fredy Goncalves MD   OPERATING PHYSICIAN: Fredy Goncalves MD   PATIENT ACCOUNT#:   [id number]    LOCATION:  [place] [address]  MEDICAL RECORD #:   [id number]       YOB: 1945  ADMISSION DATE:       07/24/2024      OPERATION DATE:  07/24/2024    CARDIAC PROCEDURE TRANSCRIPTION      CARDIOVERSION  PREOPERATIVE DIAGNOSIS:    POSTOPERATIVE DIAGNOSIS:    PROCEDURE PERFORMED:  Direct current cardioversion.    INDICATION:  The patient was brought for elective cardioversion after he was found to have atrial fibrillation.  He has been consistently taking his Xarelto for over 1 month.      PROCEDURE IN DETAIL:  After the informed consent was obtained and placed in the chart, the patient was taken to cardiac catheterization laboratory.       Brevital 60 mg medication was then administered to achieve appropriate sedation.  Then, 175 joules synchronized shock was delivered.  The patient converted to sinus rhythm.  Postprocedure EKG was obtained and placed in the chart.    SUMMARY:  Successful direct current cardioversion.  Patient converted to normal sinus rhythm.     COMPLICATIONS:  None.    Dictated By Fredy Goncalves MD  d: 07/24/2024 09:21:08  t: 07/24/2024 18:46:43  Job [id number]  /

## 2024-07-29 RX ORDER — LOSARTAN POTASSIUM 100 MG/1
100 TABLET ORAL DAILY
Qty: 90 TABLET | Refills: 3 | Status: SHIPPED | OUTPATIENT
Start: 2024-07-29

## 2024-07-29 NOTE — TELEPHONE ENCOUNTER
Refill Per Protocol     Requested Prescriptions   Pending Prescriptions Disp Refills    LOSARTAN 100 MG Oral Tab [Pharmacy Med Name: LOSARTAN 100MG TABLETS] 90 tablet 3     Sig: TAKE 1 TABLET BY MOUTH DAILY       Hypertension Medications Protocol Passed - 7/25/2024 11:53 AM        Passed - CMP or BMP in past 12 months        Passed - Last BP reading less than 140/90     BP Readings from Last 1 Encounters:   07/24/24 132/78               Passed - In person appointment or virtual visit in the past 12 mos or appointment in next 3 mos     Recent Outpatient Visits              2 weeks ago Other synovitis and tenosynovitis, [facility] hand    [facility] Allen Nair MD    Office Visit    3 months ago Atrial fibrillation, unspecified type (HCC)    [facility] Jorge Vivas MD    Office Visit    4 months ago Other synovitis and tenosynovitis, [facility] Allen Nair MD    Office Visit    8 months ago Nephrolithiasis    [facility] Maxine Mike MD    Office Visit    9 months ago Essential hypertension    [facility] [place] Jorge Vivas MD    Office Visit          Future Appointments         Provider Department Appt Notes    In 2 months Robles Paulson MD [facility] ep ee    In 2 months Jorge Vivas MD [facility] 6 m    In 3 months [facility] CT [address] CT - [place] Kidney stones    In 3 months Adia Ruggiero, PA-C [facility] 1 year follow up, kidney stones, pt of dr. mike, policy informed                    Passed - EGFRCR or GFRNAA > 50     GFR Evaluation  EGFRCR: 64 , resulted on 7/17/2024                 Future Appointments          Provider Department Appt Notes    In 2 months Robles Paulson MD [facility] ep ee    In 2 months Jorge Vivas MD [facility] 6 m    In 3 months [facility] CT 1 [facility] CT - [place] Kidney stones    In 3 months Adia Ruggiero, PABebetoC [facility] 1 year follow up, kidney stones, pt of dr. mike, policy informed          Recent Outpatient Visits              2 weeks ago Other synovitis and tenosynovitis, right hand    [facility] [place] Allen Nair MD    Office Visit    3 months ago Atrial fibrillation, unspecified type (HCC)    [facility] [place] Jorge Vivas MD    Office Visit    4 months ago Other synovitis and tenosynovitis, [facility] hand    [facility] [place] Allen Nair MD    Office Visit    8 months ago Nephrolithiasis    [facility] Maxine Mike MD    Office Visit    9 months ago Essential hypertension    [facility] [place]    Office Visit

## 2024-09-13 ENCOUNTER — IMMUNIZATION (OUTPATIENT)
Dept: LAB | Age: 79
End: 2024-09-13
Attending: EMERGENCY MEDICINE
Payer: MEDICARE

## 2024-09-13 DIAGNOSIS — Z23 NEED FOR VACCINATION: Primary | ICD-10-CM

## 2024-09-13 PROCEDURE — 90480 ADMN SARSCOV2 VAC 1/ONLY CMP: CPT

## 2024-10-10 ENCOUNTER — IMMUNIZATION (OUTPATIENT)
Dept: LAB | Age: 79
End: 2024-10-10
Attending: EMERGENCY MEDICINE
Payer: MEDICARE

## 2024-10-10 DIAGNOSIS — Z23 NEED FOR VACCINATION: Primary | ICD-10-CM

## 2024-10-10 PROCEDURE — 90471 IMMUNIZATION ADMIN: CPT

## 2024-10-10 PROCEDURE — 90662 IIV NO PRSV INCREASED AG IM: CPT

## 2024-10-21 ENCOUNTER — OFFICE VISIT (OUTPATIENT)
Dept: INTERNAL MEDICINE CLINIC | Facility: CLINIC | Age: 79
End: 2024-10-21

## 2024-10-21 VITALS
HEART RATE: 64 BPM | WEIGHT: 195 LBS | SYSTOLIC BLOOD PRESSURE: 132 MMHG | BODY MASS INDEX: 27.3 KG/M2 | OXYGEN SATURATION: 100 % | HEIGHT: 71 IN | DIASTOLIC BLOOD PRESSURE: 74 MMHG

## 2024-10-21 DIAGNOSIS — I10 ESSENTIAL HYPERTENSION: Primary | ICD-10-CM

## 2024-10-21 DIAGNOSIS — E78.00 HYPERCHOLESTEROLEMIA: ICD-10-CM

## 2024-10-21 DIAGNOSIS — R73.02 IMPAIRED GLUCOSE TOLERANCE: ICD-10-CM

## 2024-10-21 DIAGNOSIS — I48.91 ATRIAL FIBRILLATION, UNSPECIFIED TYPE (HCC): Chronic | ICD-10-CM

## 2024-10-21 DIAGNOSIS — Z12.5 SCREENING PSA (PROSTATE SPECIFIC ANTIGEN): ICD-10-CM

## 2024-10-21 PROCEDURE — 99214 OFFICE O/P EST MOD 30 MIN: CPT | Performed by: INTERNAL MEDICINE

## 2024-10-21 PROCEDURE — G2211 COMPLEX E/M VISIT ADD ON: HCPCS | Performed by: INTERNAL MEDICINE

## 2024-10-21 RX ORDER — ASPIRIN 325 MG
325 TABLET ORAL DAILY
COMMUNITY

## 2024-10-21 NOTE — PROGRESS NOTES
Robles Chan is a 79 year old male.  Chief Complaint   Patient presents with    Follow - Up     6 month follow up     HPI:   79 gentleman here for follow-up.  He reports that he is doing well.  He is back for cataract surgery.  He denies any chest pain or shortness of the palpitations.  Currently he is only taking aspirin for his atrial fibrillation.      Current Outpatient Medications   Medication Sig Dispense Refill    aspirin 325 MG Oral Tab Take 1 tablet (325 mg total) by mouth daily.      losartan 100 MG Oral Tab Take 1 tablet (100 mg total) by mouth daily. 90 tablet 3    hydroCHLOROthiazide 12.5 MG Oral Tab Take 1 tablet (12.5 mg total) by mouth daily. 90 tablet 3    atorvastatin 40 MG Oral Tab Take 1.5 tablets (60 mg total) by mouth nightly. 135 tablet 3    TURMERIC OR Take by mouth.      Ascorbic Acid (VITAMIN C) 100 MG Oral Tab Take 1 tablet (100 mg total) by mouth daily.      Vitamin D3, Cholecalciferol, (VITAMIN D3) 125 MCG (5000 UT) Oral Cap Take 1 capsule (5,000 Units total) by mouth daily. Pt takes 5000 units every other day      acidophilus-pectin Oral Cap Take 1 capsule by mouth daily. (Patient not taking: Reported on 10/21/2024)        Past Medical History:    Actinic keratosis    Acute carpal tunnel syndrome of left wrist    Atrial fibrillation (HCC)    BCC (basal cell carcinoma of skin)    Calculus of kidney    Cancer (HCC)    Encounter for observation of suspected disorder    High blood pressure    High cholesterol    Numbness and tingling in left hand    Osteoarthritis    Skin cancer    Visual impairment    glasses      Past Surgical History:   Procedure Laterality Date    Carpal tunnel release      Colonoscopy      Colonoscopy N/A 12/20/2022    Procedure: COLONOSCOPY;  Surgeon: Juan Kerr MD;  Location: [facility] ENDOSCOPY    Other      squamous cell removal on the leg    Other Left     has retained needle from stepping on needle     Total hip replacement Left 02/04/2021    Total knee  replacement Right 2021    Wrist arthroscop,release xvers lig Left 2018    Left endoscopic carpal tunnel release      Social History:  Social History     Socioeconomic History    Marital status:    Tobacco Use    Smoking status: Former     Current packs/day: 0.00     Average packs/day: 0.5 packs/day for 14.0 years (7.0 ttl pk-yrs)     Types: Cigarettes     Start date: 1966     Quit date: 1980     Years since quittin.4    Smokeless tobacco: Never   Vaping Use    Vaping status: Never Used   Substance and Sexual Activity    Alcohol use: Yes     Comment: couple drinks weekly    Drug use: No    Sexual activity: Yes   Other Topics Concern    Caffeine Concern Yes     Comment: Tea, coffee, 2 cups daily    Left Handed No    Right Handed Yes    Currently spends a great deal of time in the sun No    History of tanning No    Hx of Spending Great Deal of Time in Sun No    Bad sunburns in the past No    Tanning Salons in the Past No    Hx of Radiation Treatments No      Family History   Problem Relation Age of Onset    Cancer Mother         Cancer-breast    Cancer Father         leukemia    Diabetes Neg     Glaucoma Neg     Macular degeneration Neg       Allergies[1]     REVIEW OF SYSTEMS:   Review of Systems   Review of Systems   Constitutional: Negative for activity change, appetite change and fever.   HENT: Negative for congestion and voice change.    Respiratory: Negative for cough and shortness of breath.    Cardiovascular: Negative for chest pain.   Gastrointestinal: Negative for abdominal distention, abdominal pain and vomiting.   Genitourinary: Negative for hematuria.   Skin: Negative for wound.   Psychiatric/Behavioral: Negative for behavioral problems.   Wt Readings from Last 5 Encounters:   10/21/24 195 lb (88.5 kg)   24 188 lb (85.3 kg)   24 197 lb (89.4 kg)   10/12/23 195 lb (88.5 kg)   23 191 lb (86.6 kg)     Body mass index is 27.2 kg/m².      EXAM:   /74    Pulse 64   Ht 5' 11\" (1.803 m)   Wt 195 lb (88.5 kg)   SpO2 100%   BMI 27.20 kg/m²   Physical Exam   Constitutional:       Appearance: Normal appearance.   HENT:      Head: Normocephalic.   Eyes:      Conjunctiva/sclera: Conjunctivae normal.   Cardiovascular:      Rate and Rhythm: Normal rate and regular rhythm.      Heart sounds: Normal heart sounds. No murmur heard.  Pulmonary:      Effort: Pulmonary effort is normal.      Breath sounds: Normal breath sounds. No rhonchi or rales.   Abdominal:      General: Bowel sounds are normal.      Palpations: Abdomen is soft.      Tenderness: There is no abdominal tenderness.   Musculoskeletal:      Cervical back: Neck supple.      Right lower leg: No edema.      Left lower leg: No edema.   Skin:     General: Skin is warm and dry.   Neurological:      General: No focal deficit present.      Mental Status: He is alert and oriented to person, place, and time. Mental status is at baseline.   Psychiatric:         Mood and Affect: Mood normal.         Behavior: Behavior normal.       ASSESSMENT AND PLAN:   1. Essential hypertension  Lab Results   Component Value Date    CREATSERUM 1.18 07/17/2024    TSH 1.638 04/17/2024     Will continue to monitor blood pressure.  Encouraged patient to avoid salt.  Continue current medical regimen.    - CBC, Platelet; No Differential; Future  - Comp Metabolic Panel (14); Future  - Hemoglobin A1C; Future  - Lipid Panel; Future  - TSH W Reflex To Free T4; Future    2. Hypercholesterolemia  Lab Results   Component Value Date    LDL 80 04/17/2024    AST 28 04/17/2024    ALT 28 04/17/2024      Encouraged patient to eat healthy.  Avoid fat fried foods and increase activity as tolerated.  We will monitor lipid profile and liver function test.    - CBC, Platelet; No Differential; Future  - Comp Metabolic Panel (14); Future  - Hemoglobin A1C; Future  - Lipid Panel; Future  - TSH W Reflex To Free T4; Future    3. Atrial fibrillation, unspecified type  (HCC)  Continues to be in sinus.  Follows up with cardiology.  Continue aspirin.    4. Impaired glucose tolerance  Monitor hemoglobin A1c.  - Hemoglobin A1C; Future    5. Screening PSA (prostate specific antigen)  Patient would like to continue with the screening PSA.  - PSA Total, Screen; Future    Plan: Labs ordered.  Medications reviewed.  I will see him back in 6 months for Medicare physical.  Meanwhile, if there is any concerns he will contact me.      The patient indicates understanding of these issues and agrees to the plan.  No follow-ups on file.    This note was prepared using Dragon Medical voice recognition dictation software. As a result errors may occur. When identified these errors have been corrected. While every attempt is made to correct errors during dictation discrepancies may still exist.         [1] No Known Allergies

## 2024-11-13 ENCOUNTER — HOSPITAL ENCOUNTER (OUTPATIENT)
Dept: CT IMAGING | Age: 79
Discharge: HOME | End: 2024-11-13
Attending: UROLOGY
Payer: MEDICARE

## 2024-11-13 DIAGNOSIS — N20.0 NEPHROLITHIASIS: ICD-10-CM

## 2024-11-13 PROCEDURE — 74176 CT ABD & PELVIS W/O CONTRAST: CPT | Performed by: UROLOGY

## 2024-11-18 NOTE — PROGRESS NOTES
[facility]  Follow Up Visit    HPI:   Robles Chan is a 79 year old male here today for nephrolithiasis. The patient was last seen by Dr Diggs on 11/17/2023.     The patient has a history of nephrolithiasis. He passed a kidney stone in spring 2023 which he brought to our clinic.  04/07/2023 CT scan does demonstrate 3 additional nonobstructing stones.     He completed a RBUS on 11/10/23 that showed bilateral nephrolithiasis without sonographic evidence of hydronephrosis. Simple-appearing right renal cysts. Prostatomegaly with probable chronic outlet obstruction.   The patient chose observation of the stones at his previous visit.    The patient states he is feeling well. No c/o flank pain. Denies urinary symptoms.      11/13/2024 CT showed there is redemonstration of several nonobstructing stones within the bilateral kidneys which have minimally increased in size since previous exam.  (2-3 within the right kidney and  4-5 within the left kidney close).  On previous study these measure between 1 and 4 mm.  On current study these measure between 1 and 6 mm.  There are no obstructing stones or associated abnormalities.  There is also redemonstration of a few simple appearing cysts within the right kidney.  There is slight symmetric perinephric stranding compatible with some degree of medical renal disease.  There is no hydronephrosis, obstructing stone, or solid appearing contour deforming renal mass.     Past Medical History:    Actinic keratosis    Acute carpal tunnel syndrome of left wrist    Atrial fibrillation (HCC)    BCC (basal cell carcinoma of skin)    Calculus of kidney    Cancer (HCC)    Encounter for observation of suspected disorder    High blood pressure    High cholesterol    Numbness and tingling in left hand    Osteoarthritis    Skin cancer    Visual impairment    glasses     Past Surgical History:   Procedure Laterality Date    Carpal tunnel release      Colonoscopy      Colonoscopy  N/A 2022    Procedure: COLONOSCOPY;  Surgeon: Juan Kerr MD;  Location: [facility] ENDOSCOPY    Other      squamous cell removal on the leg    Other Left     has retained needle from stepping on needle     Total hip replacement Left 2021    Total knee replacement Right 2021    Wrist arthroscop,release xvers lig Left 2018    Left endoscopic carpal tunnel release     Family History   Problem Relation Age of Onset    Cancer Mother         Cancer-breast    Cancer Father         leukemia    Diabetes Neg     Glaucoma Neg     Macular degeneration Neg      Social History     Socioeconomic History    Marital status:    Tobacco Use    Smoking status: Former     Current packs/day: 0.00     Average packs/day: 0.5 packs/day for 14.0 years (7.0 ttl pk-yrs)     Types: Cigarettes     Start date: 1966     Quit date: 1980     Years since quittin.5    Smokeless tobacco: Never   Vaping Use    Vaping status: Never Used   Substance and Sexual Activity    Alcohol use: Yes     Comment: couple drinks weekly    Drug use: No    Sexual activity: Yes   Other Topics Concern    Caffeine Concern Yes     Comment: Tea, coffee, 2 cups daily    Left Handed No    Right Handed Yes    Currently spends a great deal of time in the sun No    History of tanning No    Hx of Spending Great Deal of Time in Sun No    Bad sunburns in the past No    Tanning Salons in the Past No    Hx of Radiation Treatments No     Current Outpatient Medications   Medication Sig Dispense Refill    aspirin 325 MG Oral Tab Take 1 tablet (325 mg total) by mouth daily.      losartan 100 MG Oral Tab Take 1 tablet (100 mg total) by mouth daily. 90 tablet 3    hydroCHLOROthiazide 12.5 MG Oral Tab Take 1 tablet (12.5 mg total) by mouth daily. 90 tablet 3    acidophilus-pectin Oral Cap Take 1 capsule by mouth daily. (Patient not taking: Reported on 10/21/2024)      atorvastatin 40 MG Oral Tab Take 1.5 tablets (60 mg total) by mouth nightly.  135 tablet 3    TURMERIC OR Take by mouth.      Ascorbic Acid (VITAMIN C) 100 MG Oral Tab Take 1 tablet (100 mg total) by mouth daily.      Vitamin D3, Cholecalciferol, (VITAMIN D3) 125 MCG (5000 UT) Oral Cap Take 1 capsule (5,000 Units total) by mouth daily. Pt takes 5000 units every other day         Allergies: Patient has no known allergies.    REVIEW OF SYSTEMS:  Review of Systems   All other systems reviewed and are negative.        EXAM:  There were no vitals taken for this visit.    Physical Exam  Constitutional:       Appearance: Normal appearance.   HENT:      Head: Normocephalic and atraumatic.      Mouth/Throat:      Mouth: Mucous membranes are moist.   Pulmonary:      Effort: Pulmonary effort is normal.   Abdominal:      General: Abdomen is flat.      Palpations: Abdomen is soft.   Skin:     General: Skin is warm and dry.   Neurological:      Mental Status: He is alert and oriented to person, place, and time.   Psychiatric:         Mood and Affect: Mood normal.         Thought Content: Thought content normal.          LABS:  Lab Results   Component Value Date    PSA 1.6 10/15/2018    TOTPSASCREEN 1.2 09/23/2016     Lab Results   Component Value Date    WBC 4.3 04/17/2024    RBC 4.39 04/17/2024    HGB 14.8 04/17/2024    HCT 43.9 04/17/2024    .0 04/17/2024    MCH 33.7 04/17/2024    MCHC 33.7 04/17/2024    RDW 12.1 04/17/2024    .0 04/17/2024    MPV 8.9 10/15/2018     Lab Results   Component Value Date     (H) 07/17/2024    BUN 19 07/17/2024    BUNCREA 16.1 07/17/2024    CREATSERUM 1.18 07/17/2024    ANIONGAP 6 07/17/2024    GFRNAA 65 05/19/2022    GFRAA 75 05/19/2022    CA 9.7 07/17/2024     07/17/2024    K 4.1 07/17/2024     07/17/2024    CO2 25.0 07/17/2024     No results found for: \"PTP\", \"PT\", \"INR\"    IMAGING:  CT ABDOMEN+PELVIS KIDNEYSTONE 2D RNDR(NO IV,NO ORAL)(CPT=74176)    Result Date: 11/13/2024  PROCEDURE:   CT ABDOMEN + PELVIS KIDNEYSTONE 2D RNDR (NO IV NO  ORAL) (CPT=74176)  COMPARISON: [facility] [place] [facility], CT ABDOMEN + PELVIS KIDNEYSTONE 2D RNDR (NO IV NO ORAL) (CPT=741, 4/07/2023, 10:47 AM.  INDICATIONS: N20.0 Nephrolithiasis  TECHNIQUE:   CT images of the abdomen and pelvis were obtained without intravenous contrast material.  Automated exposure control for dose reduction was used. Adjustment of the mA and/or kV was done based on the patient's size. Use of iterative reconstruction technique for dose reduction was used. Dose information is transmitted to the ACR (American College of Radiology) NRDR (National Radiology Data Registry) which includes the Dose Index Registry.   FINDINGS:   The following findings were made:  1. 1. There is redemonstration of several nonobstructing stones within the bilateral kidneys which have minimally increased in size since previous exam.  (2-3 within the right kidney and  4-5 within the left kidney close).  On previous study these measure between 1 and 4 mm.  On current study these measure between 1 and 6 mm.  There are no obstructing stones or associated abnormalities.  There is also redemonstration of a few simple appearing cysts within the right kidney.  There is slight symmetric perinephric stranding compatible with some degree of medical renal disease.  There is no hydronephrosis, obstructing stone, or solid appearing contour deforming renal mass.   2. There are a few scattered ground-glass peribronchial densities within the bilateral lungs which could represent atelectasis or resolving multifocal infectious process.  3. The liver demonstrates very slight decreased attenuation compatible with minimal fatty infiltration.  There are no focal hepatic lesions.  The gallbladder is normal in appearance.  4. There are extensive atherosclerotic calcifications within a nonaneurysmal abdominal aorta and iliac arterial system.  5. There is a large amount of stool within a non distended colon.  The findings are most  compatible with moderate to severe constipation.  There is no ct evidence of bowel obstruction, ischemia, perforation, or abscess formation.  6. There is moderate to significant enlargement of the prostate gland which measures 5 by 6 x 5 cm. There are no associated abnormalities.  7. There are moderate to severe degenerative changes within the lower thoracic and lumbar spine.  There is no acute or destructive bony process.  The remainder of the examination is unremarkable given the limitations of the study secondary to lack of iv and oral contrast.  Specifically, the visualized aspects of the spleen, pancreas, gallbladder, adrenals, stomach, small bowel, and remaining soft tissues demonstrated grossly normal ct appearance for patient of this age.  There is no free fluid or organized fluid collection.  There is no significant adenopathy.            CONCLUSION: .  1. THERE IS REDEMONSTRATION OF SEVERAL NONOBSTRUCTING STONES WITHIN THE BILATERAL KIDNEYS WHICH HAVE MINIMALLY INCREASED IN SIZE SINCE PREVIOUS EXAM.  (2-3 WITHIN THE RIGHT KIDNEY AND  4-5 WITHIN THE LEFT KIDNEY).  THERE ARE NO ASSOCIATED ABNORMALITIES.  2. THERE ARE A FEW SCATTERED GROUND-GLASS PERIBRONCHIAL DENSITIES WITHIN THE BILATERAL LUNGS WHICH COULD REPRESENT ATELECTASIS OR RESOLVING MULTIFOCAL INFECTIOUS PROCESS.  3. STABLE NONACUTE FINDINGS ARE ALSO PRESENT AND ARE DESCRIBED WITHIN THE BODY OF THE REPORT.   Dictated by (CST): Adalberto Feliciano MD on 11/13/2024 at 12:49 PM     Finalized by (CST): Adalberto Feliciano MD on 11/13/2024 at 1:00 PM            IMPRESSION:  Bilateral Kidney Stones    Different stone management options were discussed including watchful waiting, medical expulsive therapy, ESWL with or without pre-stenting, ureteroscopy with laser lithotripsy, and percutaneous nephrolithotomy.     Rationale and approach as well as benefits, risks, possible complications and reasonable alternatives of each treatment were discussed with the  patient.  Stone clearance rates were discussed as well as the expected postoperative course of recovery.   We discussed the eventual need for stent removal which is usually performed in the office setting. .  We discussed risks of surgery including but not limited to medical and anesthetic complications, bleeding, infection, damage to surrounding organs or structures, ureteral injury, stricture formation, and need for additional procedures.   Patient prefers to monitor the stones for now. Instructed patient to go to the ER if febrile with flank pain.  Patient verbalized understanding. All questions were answered.     PLAN:  - CT Kidney stone protocol in one year  - Follow-up a few days after the CT    LYDIA Poon  11/20/2024

## 2024-11-20 ENCOUNTER — OFFICE VISIT (OUTPATIENT)
Dept: SURGERY | Facility: CLINIC | Age: 79
End: 2024-11-20
Payer: MEDICARE

## 2024-11-20 DIAGNOSIS — N20.0 NEPHROLITHIASIS: Primary | ICD-10-CM

## 2024-11-20 PROCEDURE — 99213 OFFICE O/P EST LOW 20 MIN: CPT

## 2025-02-11 ENCOUNTER — MED REC SCAN ONLY (OUTPATIENT)
Dept: INTERNAL MEDICINE CLINIC | Facility: CLINIC | Age: 80
End: 2025-02-11

## 2025-02-28 NOTE — TELEPHONE ENCOUNTER
LOV  12/8/21  Patient aware to look for a new PCP after 1/1/22  Patient have appointment with new PCP on 6/7/22 Pt diet advanced to regular. MRI Screen completed. Pt unaware when neck surgery was done or what was placed inside. Pt given 24hr container for urine. Pt informed to tell staff when ice is running low.

## 2025-04-09 ENCOUNTER — OFFICE VISIT (OUTPATIENT)
Dept: PODIATRY CLINIC | Facility: CLINIC | Age: 80
End: 2025-04-09
Payer: MEDICARE

## 2025-04-09 VITALS — BODY MASS INDEX: 25.47 KG/M2 | WEIGHT: 188 LBS | HEIGHT: 72 IN

## 2025-04-09 DIAGNOSIS — M79.9 SOFT TISSUE LESION OF FOOT: Primary | ICD-10-CM

## 2025-04-09 DIAGNOSIS — B35.1 ONYCHOMYCOSIS: ICD-10-CM

## 2025-04-09 PROCEDURE — 99204 OFFICE O/P NEW MOD 45 MIN: CPT | Performed by: PODIATRIST

## 2025-04-09 NOTE — PROGRESS NOTES
Reason for Visit      Robles Chan is a 79 year old male presents today complaining of left foot soft tissue lesions.     History of Present Illness     Patient presents to clinic today complaining of a painful soft tissue lesion on his lower extremity.  Patient has a history of A-fib, hypertension and hypercholesteremia basal cell carcinoma.  Patient presents to clinic today complaining of a painful soft tissue lesion subfifth metatarsal head of the left foot.  Patient states it has been there for few months and only causes him pain when he is walking barefoot.  Patient denies any drainage from the area.    The following portions of the patient's history were reviewed and updated as appropriate: allergies, current medications, past family history, past medical history, past social history, past surgical history and problem list.    Allergies[1]    Medications - Current[2]    There are no discontinued medications.    Problem List[3]    Past Medical History[4]    Past Surgical History[5]    Family History[6]    Social History     Occupational History    Not on file   Tobacco Use    Smoking status: Former     Current packs/day: 0.00     Average packs/day: 0.5 packs/day for 14.0 years (7.0 ttl pk-yrs)     Types: Cigarettes     Start date: 1966     Quit date: 1980     Years since quittin.9    Smokeless tobacco: Never   Vaping Use    Vaping status: Never Used   Substance and Sexual Activity    Alcohol use: Yes     Comment: couple drinks weekly    Drug use: No    Sexual activity: Yes       ROS      Constitutional: negative for chills, fevers and sweats  Gastrointestinal: negative for abdominal pain, diarrhea, nausea and vomiting  Genitourinary:negative for dysuria and hematuria  Musculoskeletal:negative for arthralgias and muscle weakness  Neurological: negative for paresthesia and weakness  All others reviewed and negative.      Physical Exam     LE PHYSICAL EXAM    Constitution: Well-developed and  well-nourished. Gait appears normal. No apparent distress. Alert and oriented to person, place, and time.  Integument: There are no varicosities. Skin appears moist, warm, and supple with positive hair growth. There are no color changes. No open lesions. No macerations, No Hyperkeratotic lesions.  Vascular examination: Dorsalis pedis and posterior tibial pulses are strong bilaterally with capillary filling time less than 3 seconds to all digits. There is no peripheral edema..  Neurological Sensorium: Grossly intact to sharp/dull. Vibratory: Intact.  Musculoskeletal:   5/5 pedal muscle strength b/l     Hyperkeratotic lesion subfifth metatarsal head of the left foot.  No fluctuance drainage or ascending cellulitis.  No signs of infection noted.    Assessment and Plan     Encounter Diagnoses   Name Primary?    Soft tissue lesion of foot Yes    Onychomycosis    Discussed differential diagnosis of plantar verruca versus hyperkeratotic lesion secondary to his tailor's bunion.  Will start with a biopsy result at today's office visit.  Also discussed appropriate shoe gear in great detail.  Patient will start with biopsy results and will recommend treatment based on further information.    Patient was instructed to call the office or on-call podiatric physician immediately with any issues or concerns before the next scheduled visit. Patient to follow-up in clinic in after biopsy results      Krissy Tucker DPM, D.JUSTICE GAMEZ  Diplomat, American Board of Foot and Ankle Surgery  Certified in Foot and Rearfoot/Ankle Reconstruction  Fellow of the American College of Foot and Ankle Surgeons  Fellowship Trained Foot and Ankle Surgeon   [facility]     4/9/2025    6:45 AM         [1] No Known Allergies  [2]   Current Outpatient Medications:     aspirin 325 MG Oral Tab, Take 1 tablet (325 mg total) by mouth daily., Disp: , Rfl:     losartan 100 MG Oral Tab, Take 1 tablet (100 mg total) by mouth daily., Disp:  90 tablet, Rfl: 3    hydroCHLOROthiazide 12.5 MG Oral Tab, Take 1 tablet (12.5 mg total) by mouth daily., Disp: 90 tablet, Rfl: 3    acidophilus-pectin Oral Cap, Take 1 capsule by mouth daily. (Patient not taking: Reported on 10/21/2024), Disp: , Rfl:     atorvastatin 40 MG Oral Tab, Take 1.5 tablets (60 mg total) by mouth nightly., Disp: 135 tablet, Rfl: 3    TURMERIC OR, Take by mouth., Disp: , Rfl:     Ascorbic Acid (VITAMIN C) 100 MG Oral Tab, Take 1 tablet (100 mg total) by mouth daily., Disp: , Rfl:     Vitamin D3, Cholecalciferol, (VITAMIN D3) 125 MCG (5000 UT) Oral Cap, Take 1 capsule (5,000 Units total) by mouth daily. Pt takes 5000 units every other day, Disp: , Rfl:   [3]   Patient Active Problem List  Diagnosis    Myopia, bilateral    Actinic keratosis    History of nonmelanoma skin cancer    Essential hypertension    Hypercholesterolemia    Age-related nuclear cataract of both eyes    PMR (polymyalgia rheumatica) (HCC)    Primary osteoarthritis of left hip    S/P hip replacement, left    Primary osteoarthritis of right knee    Atrial fibrillation (HCC)    Floater, vitreous, bilateral    Nephrolithiasis   [4]   Past Medical History:   Actinic keratosis    Acute carpal tunnel syndrome of left wrist    Atrial fibrillation (HCC)    BCC (basal cell carcinoma of skin)    Calculus of kidney    Cancer (HCC)    Encounter for observation of suspected disorder    High blood pressure    High cholesterol    Numbness and tingling in left hand    Osteoarthritis    Skin cancer    Visual impairment    glasses   [5]   Past Surgical History:  Procedure Laterality Date    Carpal tunnel release      Cataract      Colonoscopy      Colonoscopy N/A 12/20/2022    Procedure: COLONOSCOPY;  Surgeon: Juan Kerr MD;  Location: [facility] ENDOSCOPY    Other      squamous cell removal on the leg    Other Left     has retained needle from stepping on needle     Total hip replacement Left 02/04/2021    Total knee replacement Right  08/13/2021    Wrist arthroscop,release xvers lig Left 06/26/2018    Left endoscopic carpal tunnel release   [6]   Family History  Problem Relation Age of Onset    Cancer Mother         Cancer-breast    Cancer Father         leukemia    Diabetes Neg     Glaucoma Neg     Macular degeneration Neg

## 2025-04-14 ENCOUNTER — LAB ENCOUNTER (OUTPATIENT)
Dept: LAB | Age: 80
End: 2025-04-14
Attending: INTERNAL MEDICINE
Payer: MEDICARE

## 2025-04-14 DIAGNOSIS — I10 ESSENTIAL HYPERTENSION: ICD-10-CM

## 2025-04-14 DIAGNOSIS — Z12.5 SCREENING PSA (PROSTATE SPECIFIC ANTIGEN): ICD-10-CM

## 2025-04-14 DIAGNOSIS — R73.02 IMPAIRED GLUCOSE TOLERANCE: ICD-10-CM

## 2025-04-14 DIAGNOSIS — E78.00 HYPERCHOLESTEROLEMIA: ICD-10-CM

## 2025-04-14 LAB
ALBUMIN SERPL-MCNC: 4.2 G/DL (ref 3.2–4.8)
ALBUMIN/GLOB SERPL: 1.7 {RATIO} (ref 1–2)
ALP LIVER SERPL-CCNC: 75 U/L (ref 45–117)
ALT SERPL-CCNC: 23 U/L (ref 10–49)
ANION GAP SERPL CALC-SCNC: 6 MMOL/L (ref 0–18)
AST SERPL-CCNC: 34 U/L (ref ?–34)
BILIRUB SERPL-MCNC: 0.6 MG/DL (ref 0.2–1.1)
BUN BLD-MCNC: 23 MG/DL (ref 9–23)
BUN/CREAT SERPL: 21.3 (ref 10–20)
CALCIUM BLD-MCNC: 9.5 MG/DL (ref 8.7–10.4)
CHLORIDE SERPL-SCNC: 107 MMOL/L (ref 98–112)
CHOLEST SERPL-MCNC: 180 MG/DL (ref ?–200)
CO2 SERPL-SCNC: 29 MMOL/L (ref 21–32)
COMPLEXED PSA SERPL-MCNC: 1.77 NG/ML (ref ?–4)
CREAT BLD-MCNC: 1.08 MG/DL (ref 0.7–1.3)
DEPRECATED RDW RBC AUTO: 46.2 FL (ref 35.1–46.3)
EGFRCR SERPLBLD CKD-EPI 2021: 71 ML/MIN/1.73M2 (ref 60–?)
ERYTHROCYTE [DISTWIDTH] IN BLOOD BY AUTOMATED COUNT: 12.3 % (ref 11–15)
EST. AVERAGE GLUCOSE BLD GHB EST-MCNC: 117 MG/DL (ref 68–126)
FASTING PATIENT LIPID ANSWER: YES
FASTING STATUS PATIENT QL REPORTED: YES
GLOBULIN PLAS-MCNC: 2.5 G/DL (ref 2–3.5)
GLUCOSE BLD-MCNC: 109 MG/DL (ref 70–99)
HBA1C MFR BLD: 5.7 % (ref ?–5.7)
HCT VFR BLD AUTO: 38 % (ref 39–53)
HDLC SERPL-MCNC: 95 MG/DL (ref 40–59)
HGB BLD-MCNC: 12.9 G/DL (ref 13–17.5)
LDLC SERPL CALC-MCNC: 77 MG/DL (ref ?–100)
MCH RBC QN AUTO: 34.8 PG (ref 26–34)
MCHC RBC AUTO-ENTMCNC: 33.9 G/DL (ref 31–37)
MCV RBC AUTO: 102.4 FL (ref 80–100)
NONHDLC SERPL-MCNC: 85 MG/DL (ref ?–130)
OSMOLALITY SERPL CALC.SUM OF ELEC: 298 MOSM/KG (ref 275–295)
PLATELET # BLD AUTO: 135 10(3)UL (ref 150–450)
POTASSIUM SERPL-SCNC: 4.9 MMOL/L (ref 3.5–5.1)
PROT SERPL-MCNC: 6.7 G/DL (ref 5.7–8.2)
RBC # BLD AUTO: 3.71 X10(6)UL (ref 3.8–5.8)
SODIUM SERPL-SCNC: 142 MMOL/L (ref 136–145)
TRIGL SERPL-MCNC: 36 MG/DL (ref 30–149)
TSI SER-ACNC: 1.25 UIU/ML (ref 0.55–4.78)
VLDLC SERPL CALC-MCNC: 6 MG/DL (ref 0–30)
WBC # BLD AUTO: 4.5 X10(3) UL (ref 4–11)

## 2025-04-14 PROCEDURE — 85027 COMPLETE CBC AUTOMATED: CPT

## 2025-04-14 PROCEDURE — 83036 HEMOGLOBIN GLYCOSYLATED A1C: CPT

## 2025-04-14 PROCEDURE — 80053 COMPREHEN METABOLIC PANEL: CPT

## 2025-04-14 PROCEDURE — 36415 COLL VENOUS BLD VENIPUNCTURE: CPT

## 2025-04-14 PROCEDURE — 80061 LIPID PANEL: CPT

## 2025-04-14 PROCEDURE — 84443 ASSAY THYROID STIM HORMONE: CPT

## 2025-04-14 NOTE — PROGRESS NOTES
LOV 4/9, there are several types on [employer] for Powerstep orthotics, is there a particular type the pt should purchase?

## 2025-04-17 ENCOUNTER — TELEPHONE (OUTPATIENT)
Dept: SURGERY | Facility: CLINIC | Age: 80
End: 2025-04-17

## 2025-04-17 NOTE — TELEPHONE ENCOUNTER
Left message for pt, calling w/ biopsy results from Dr Saucedo.  [employer] message sent to pt w/ biopsy results as noted below.    ----- Message from Krissy Saucedo sent at 4/15/2025 11:00 AM CDT -----  Neutral power step  ----- Message -----  From: Lolis Mesa RN  Sent: 4/14/2025  12:03 PM CDT  To: Krissy Saucedo DPM    LOV 4/9, there are several types on [employer] for Powerstep orthotics, is there a particular type the pt should purchase?   ----- Message -----  From: Krissy Saucedo DPM  Sent: 4/12/2025   8:22 AM CDT  To: [facility] Clinical Staff    Please call patient and inform him that his biopsy was negative for plantar verruca only a callus.  As discussed at his office visit it is due to the structure of his foot.  We can discuss   over-the-counter orthotics to offload the area prevent it return.  The power step orthotics are what I recommend.  ----- Message -----  From: Lab, Background User  Sent: 4/11/2025   4:20 PM CDT  To: Krissy Saucedo DPM

## 2025-04-21 ENCOUNTER — OFFICE VISIT (OUTPATIENT)
Dept: INTERNAL MEDICINE CLINIC | Facility: CLINIC | Age: 80
End: 2025-04-21
Payer: MEDICARE

## 2025-04-21 VITALS
TEMPERATURE: 98 F | BODY MASS INDEX: 25.47 KG/M2 | OXYGEN SATURATION: 100 % | DIASTOLIC BLOOD PRESSURE: 66 MMHG | SYSTOLIC BLOOD PRESSURE: 126 MMHG | HEIGHT: 72 IN | WEIGHT: 188 LBS | HEART RATE: 79 BPM

## 2025-04-21 DIAGNOSIS — I48.91 ATRIAL FIBRILLATION, UNSPECIFIED TYPE (HCC): Primary | Chronic | ICD-10-CM

## 2025-04-21 DIAGNOSIS — E78.00 HYPERCHOLESTEROLEMIA: ICD-10-CM

## 2025-04-21 DIAGNOSIS — N20.0 NEPHROLITHIASIS: ICD-10-CM

## 2025-04-21 DIAGNOSIS — I10 ESSENTIAL HYPERTENSION: ICD-10-CM

## 2025-04-21 RX ORDER — HYDROCHLOROTHIAZIDE 12.5 MG/1
12.5 TABLET ORAL DAILY
Qty: 90 TABLET | Refills: 3 | Status: SHIPPED | OUTPATIENT
Start: 2025-04-21

## 2025-04-21 RX ORDER — AMOXICILLIN 500 MG/1
2000 CAPSULE ORAL ONCE
COMMUNITY
Start: 2025-01-29 | End: 2025-04-21

## 2025-04-21 NOTE — PROGRESS NOTES
Subjective:   Robles Chan is a 79 year old male who presents for a Medicare Wellness Visit charge within the last 11 months and Patient may not meet criteria for AWV: Please evaluate for correct coding and scheduled follow up of multiple significant but stable problems.   History of Present Illness    79-year-old gentleman here for Medicare annual wellness visit.  He report that he is doing well.  He has no complaints.  No falls reported.  No abuse no depression reported.  History/Other:   Fall Risk Assessment:   He has been screened for Falls and is High Risk. Fall Prevention information provided to patient in After Visit Summary.    Do you feel unsteady when standing or walking?: (Patient-Rptd) No  Do you worry about falling?: (Patient-Rptd) Yes  Have you fallen in the past year?: (Patient-Rptd) No     Cognitive Assessment:   He had a completely normal cognitive assessment - see flowsheet entries     Functional Ability/Status:   Robles Chan has some abnormal functions as listed below:  He has Hearing problems based on screening of functional status.      Depression Screening (PHQ):  PHQ-2 SCORE: 0  , done 4/14/2025        <5 minutes spent screening and counseling for depression    Advanced Directives:   He does have a Living Will but we do NOT have it on file in Epic.    He does have a POA but we do NOT have it on file in Epic.    Discussed Advance Care Planning with patient (and family/surrogate if present). Standard forms made available to patient in After Visit Summary.      Problem List[1]  Allergies:  He has no known allergies.    Current Medications:  Active Meds, Sig Only[2]    Medical History:  He  has a past medical history of Actinic keratosis, Acute carpal tunnel syndrome of left wrist (06/04/2018), Atrial fibrillation (HCC), BCC (basal cell carcinoma of skin), Calculus of kidney, Cancer (HCC), Encounter for observation of suspected disorder, High blood pressure, High cholesterol, Numbness and  tingling in left hand, Osteoarthritis, Skin cancer, and Visual impairment.  Surgical History:  He  has a past surgical history that includes colonoscopy; wrist arthroscop,release xvers lig (Left, 06/26/2018); carpal tunnel release; other; other (Left); total hip replacement (Left, 02/04/2021); total knee replacement (Right, 08/13/2021); colonoscopy (N/A, 12/20/2022); and cataract.   Family History:  His family history includes Cancer in his father and mother.  Social History:  He  reports that he quit smoking about 44 years ago. His smoking use included cigarettes. He started smoking about 58 years ago. He has a 7 pack-year smoking history. He has never used smokeless tobacco. He reports current alcohol use. He reports that he does not use drugs.    Tobacco:  He smoked tobacco in the past but quit greater than 12 months ago.  Tobacco Use[3]     CAGE Alcohol Screen:   CAGE screening score of 0 on 4/14/2025, showing low risk of alcohol abuse.      Patient Care Team:  Jorge Vivas MD as PCP - General (Internal Medicine)    Review of Systems   Constitutional:  Negative for activity change, appetite change and fever.   HENT:  Negative for congestion and voice change.    Respiratory:  Negative for cough and shortness of breath.    Cardiovascular:  Negative for chest pain.   Gastrointestinal:  Negative for abdominal distention, abdominal pain and vomiting.   Genitourinary:  Negative for hematuria.   Skin:  Negative for wound.   Psychiatric/Behavioral:  Negative for behavioral problems.          Objective:   Physical Exam  Constitutional:       Appearance: Normal appearance.   HENT:      Head: Normocephalic.   Eyes:      Conjunctiva/sclera: Conjunctivae normal.   Cardiovascular:      Rate and Rhythm: Normal rate and regular rhythm.      Heart sounds: Normal heart sounds. No murmur heard.  Pulmonary:      Effort: Pulmonary effort is normal.      Breath sounds: Normal breath sounds. No rhonchi or rales.   Abdominal:       General: Bowel sounds are normal.      Palpations: Abdomen is soft.      Tenderness: There is no abdominal tenderness.   Musculoskeletal:      Cervical back: Neck supple.      Right lower leg: No edema.      Left lower leg: No edema.   Skin:     General: Skin is warm and dry.   Neurological:      General: No focal deficit present.      Mental Status: He is alert and oriented to person, place, and time. Mental status is at baseline.   Psychiatric:         Mood and Affect: Mood normal.         Behavior: Behavior normal.         /66   Pulse 79   Temp 97.7 °F (36.5 °C) (Temporal)   Ht 6' (1.829 m)   Wt 188 lb (85.3 kg)   SpO2 100%   BMI 25.50 kg/m²  Estimated body mass index is 25.5 kg/m² as calculated from the following:    Height as of this encounter: 6' (1.829 m).    Weight as of this encounter: 188 lb (85.3 kg).    Medicare Hearing Assessment:   Hearing Screening    Time taken: 4/21/2025  8:40 AM  Screening Method: Questionnaire  I have a problem hearing over the telephone: No I have trouble following the conversations when two or more people are talking at the same time: No   I have trouble understanding things on the TV: No I have to strain to understand conversations: No   I have to worry about missing the telephone ring or doorbell: No I have trouble hearing conversations in a noisy background such as a crowded room or restaurant: No   I get confused about where sounds come from: No I misunderstand some words in a sentence and need to ask people to repeat themselves: No   I especially have trouble understanding the speech of women and children: No I have trouble understanding the speaker in a large room such as at a meeting or place of [employer]: No   Many people I talk to seem to mumble (or don't speak clearly): No People get annoyed because I misunderstand what they say: No   I misunderstand what others are saying and make inappropriate responses: No I avoid social activities because I cannot hear  well and fear I will reply improperly: No   Family members and friends have told me they think I may have hearing loss: No                   Assessment & Plan:   Robles Chan is a 79 year old male who presents for a Medicare Assessment.     1. Atrial fibrillation, unspecified type (HCC) (Primary) appears to be in sinus.  Continue aspirin.  Overview:  After TKR - was seen by Dr Goncalves- only on ASA  2. Essential hypertension reviewed home log blood pressure.  It is excellent.  -     Rubeola(Measles)Antibodies, IGG-Immunity; Future; Expected date: 04/21/2025  3. Hypercholesterolemia monitor lipid profile  4. Nephrolithiasis stable  Overview:  Follows up with Dr Diggs  Other orders  -     hydroCHLOROthiazide; Take 1 tablet (12.5 mg total) by mouth daily.  Dispense: 90 tablet; Refill: 3  Fall prevention discussed labs reviewed monitor platelets and hemoglobin Shingrix and pneumococcal vaccine series up-to-date.  Measles titers ordered  Assessment & Plan    The patient indicates understanding of these issues and agrees to the plan.  Reinforced healthy diet, lifestyle, and exercise.      No follow-ups on file.     Jorge Vivas MD, 4/21/2025     Supplementary Documentation:   General Health:  In the past six months, have you lost more than 10 pounds without trying?: (Patient-Rptd) 2 - No  Has your appetite been poor?: (Patient-Rptd) No  Type of Diet: (Patient-Rptd) Balanced  How does the patient maintain a good energy level?: (Patient-Rptd) Daily Walks  How would you describe your daily physical activity?: (Patient-Rptd) Moderate  How would you describe your current health state?: (Patient-Rptd) Good  How do you maintain positive mental well-being?: (Patient-Rptd) Social Interaction  On a scale of 0 to 10, with 0 being no pain and 10 being severe pain, what is your pain level?: (Patient-Rptd) 2 - (Mild)  In the past six months, have you experienced urine leakage?: (Patient-Rptd) 0-No  At any time do you feel  concerned for the safety/well-being of yourself and/or your children, in your home or elsewhere?: (Patient-Rptd) No  Have you had any immunizations at another office such as Influenza, Hepatitis B, Tetanus, or Pneumococcal?: (Patient-Rptd) Yes    Health Maintenance   Topic Date Due    COVID-19 Vaccine (10 - 2024-25 season) 03/13/2025    Annual Physical  04/17/2025    Influenza Vaccine  Completed    Annual Depression Screening  Completed    Fall Risk Screening (Annual)  Completed    Pneumococcal Vaccine: 50+ Years  Completed    Zoster Vaccines  Completed    Meningococcal B Vaccine  Aged Out    Colorectal Cancer Screening  Discontinued            [1]   Patient Active Problem List  Diagnosis    Myopia, bilateral    Actinic keratosis    History of nonmelanoma skin cancer    Essential hypertension    Hypercholesterolemia    Age-related nuclear cataract of both eyes    PMR (polymyalgia rheumatica) (HCC)    Primary osteoarthritis of left hip    S/P hip replacement, left    Primary osteoarthritis of right knee    Atrial fibrillation (HCC)    Floater, vitreous, bilateral    Nephrolithiasis   [2]   Outpatient Medications Marked as Taking for the 4/21/25 encounter (Office Visit) with Jorge Vivas MD   Medication Sig    hydroCHLOROthiazide 12.5 MG Oral Tab Take 1 tablet (12.5 mg total) by mouth daily.    aspirin 325 MG Oral Tab Take 1 tablet (325 mg total) by mouth daily.    losartan 100 MG Oral Tab Take 1 tablet (100 mg total) by mouth daily.    atorvastatin 40 MG Oral Tab Take 1.5 tablets (60 mg total) by mouth nightly.    TURMERIC OR Take by mouth.    Ascorbic Acid (VITAMIN C) 100 MG Oral Tab Take 1 tablet (100 mg total) by mouth daily.    Vitamin D3, Cholecalciferol, (VITAMIN D3) 125 MCG (5000 UT) Oral Cap Take 1 capsule (5,000 Units total) by mouth daily. Pt takes 5000 units every other day   [3]   Social History  Tobacco Use   Smoking Status Former    Current packs/day: 0.00    Average packs/day: 0.5 packs/day for  14.0 years (7.0 ttl pk-yrs)    Types: Cigarettes    Start date: 1966    Quit date: 1980    Years since quittin.9   Smokeless Tobacco Never

## 2025-04-22 ENCOUNTER — TELEPHONE (OUTPATIENT)
Dept: ORTHOPEDICS CLINIC | Facility: CLINIC | Age: 80
End: 2025-04-22

## 2025-04-22 NOTE — TELEPHONE ENCOUNTER
Addressed in 4/17 [employer] message read by patient.    ----- Message from Krissy Saucedo sent at 4/18/2025  9:29 AM CDT -----  Just a neutral powerstep orthotic  ----- Message -----  From: Lolis Mesa RN  Sent: 4/14/2025  12:03 PM CDT  To: Krissy Saucedo DPM    LOV 4/9, there are several types on [employer] for Powerstep orthotics, is there a particular type the pt should purchase?   ----- Message -----  From: Krissy Saucedo DPM  Sent: 4/12/2025   8:22 AM CDT  To: Em [facility]iatry Clinical Staff    Please call patient and inform him that his biopsy was negative for plantar verruca only a callus.  As discussed at his office visit it is due to the structure of his foot.  We can discuss   over-the-counter orthotics to offload the area prevent it return.  The power step orthotics are what I recommend.  ----- Message -----  From: Lab, Background User  Sent: 4/11/2025   4:20 PM CDT  To: Krissy Saucedo DPM

## 2025-05-09 ENCOUNTER — IMMUNIZATION (OUTPATIENT)
Dept: LAB | Age: 80
End: 2025-05-09
Attending: EMERGENCY MEDICINE
Payer: MEDICARE

## 2025-05-09 ENCOUNTER — LAB ENCOUNTER (OUTPATIENT)
Dept: LAB | Age: 80
End: 2025-05-09
Attending: INTERNAL MEDICINE
Payer: MEDICARE

## 2025-05-09 DIAGNOSIS — Z23 NEED FOR VACCINATION: Primary | ICD-10-CM

## 2025-05-09 DIAGNOSIS — I10 ESSENTIAL HYPERTENSION: ICD-10-CM

## 2025-05-09 PROCEDURE — 86765 RUBEOLA ANTIBODY: CPT

## 2025-05-09 PROCEDURE — 36415 COLL VENOUS BLD VENIPUNCTURE: CPT

## 2025-05-09 PROCEDURE — 90480 ADMN SARSCOV2 VAC 1/ONLY CMP: CPT

## 2025-05-12 LAB — MEV IGG SER-ACNC: >300 AU/ML (ref 16.5–?)

## 2025-05-21 RX ORDER — ATORVASTATIN CALCIUM 40 MG/1
60 TABLET, FILM COATED ORAL NIGHTLY
Qty: 135 TABLET | Refills: 3 | Status: SHIPPED | OUTPATIENT
Start: 2025-05-21

## 2025-05-21 NOTE — TELEPHONE ENCOUNTER
Refill Per Protocol     Requested Prescriptions   Pending Prescriptions Disp Refills    atorvastatin 40 MG Oral Tab 135 tablet 3     Sig: Take 1.5 tablets (60 mg total) by mouth nightly.       Cholesterol Medication Protocol Passed - 5/21/2025  3:19 PM        Passed - ALT < 80     Lab Results   Component Value Date    ALT 23 04/14/2025             Passed - ALT resulted within past year        Passed - Lipid panel within past 12 months     Lab Results   Component Value Date    CHOLEST 180 04/14/2025    TRIG 36 04/14/2025    HDL 95 (H) 04/14/2025    LDL 77 04/14/2025    VLDL 6 04/14/2025    NONHDLC 85 04/14/2025             Passed - In person appointment or virtual visit in the past 12 mos or appointment in next 3 mos     Recent Outpatient Visits              1 month ago Atrial fibrillation, unspecified type (HCC)    [facility] Jorge Vivas MD    Office Visit    1 month ago Soft tissue lesion of foot    [facility] [address], [place] Krissy Saucedo DPM    Office Visit    6 months ago Nephrolithiasis    [facility] Marla Mendez APRN    Office Visit    7 months ago Essential hypertension    [facility] Jorge Vivas MD    Office Visit    10 months ago Other synovitis and tenosynovitis, right hand    [facility] Allen Nair MD    Office Visit          Future Appointments         Provider Department Appt Notes    In 5 months Jorge Vivas MD [facility] 6 month follow up  last medicare 4/2025    In 5 months [facility] [address] [facility] - [place]     In 6 months Maxine Diggs MD [facility] yearly follow up                    Passed - Medication is active on med list

## 2025-06-17 ENCOUNTER — MED REC SCAN ONLY (OUTPATIENT)
Dept: INTERNAL MEDICINE CLINIC | Facility: CLINIC | Age: 80
End: 2025-06-17

## 2025-07-03 DIAGNOSIS — I10 ESSENTIAL HYPERTENSION: ICD-10-CM

## 2025-07-03 NOTE — TELEPHONE ENCOUNTER
Current Outpatient Medications:       losartan 100 MG Oral Tab, Take 1 tablet (100 mg total) by mouth daily., Disp: 90 tablet, Rfl: 3

## 2025-07-04 RX ORDER — LOSARTAN POTASSIUM 100 MG/1
100 TABLET ORAL DAILY
Qty: 90 TABLET | Refills: 3 | Status: SHIPPED | OUTPATIENT
Start: 2025-07-04

## 2025-07-04 NOTE — TELEPHONE ENCOUNTER
Refill passed per [facility] protocol.      Requested Prescriptions   Pending Prescriptions Disp Refills    losartan 100 MG Oral Tab 90 tablet 3     Sig: Take 1 tablet (100 mg total) by mouth daily.       Hypertension Medications Protocol Passed - 7/4/2025 11:55 AM        Passed - CMP or BMP in past 12 months        Passed - Last BP reading less than 140/90     BP Readings from Last 1 Encounters:   04/21/25 126/66               Passed - In person appointment or virtual visit in the past 12 mos or appointment in next 3 mos     Recent Outpatient Visits              2 months ago Atrial fibrillation, unspecified type (HCC)    [facility] Jorge Vivas MD    Office Visit    2 months ago Soft tissue lesion of foot    [facility], [address], [place] Krissy Saucedo DPM    Office Visit    7 months ago Nephrolithiasis    [facility] Marla Mendez APRN    Office Visit    8 months ago Essential hypertension    [facility] Jorge Vivas MD    Office Visit    11 months ago Other synovitis and tenosynovitis, right hand    [facility] Allen Nair MD    Office Visit          Future Appointments         Provider Department Appt Notes    In 3 months Jorge Vivas MD [facility] 6 month follow up  last medicare 4/2025    In 4 months [facility] CT [facility] - [place]     In 4 months Maxine Diggs MD [facility] yearly follow up                    Passed - EGFRCR or GFRNAA > 50     GFR Evaluation  EGFRCR: 71 , resulted on 4/14/2025          Passed - Medication is active on med list              Future Appointments         Provider Department Appt Notes    In 3 months Jorge Vivas MD [facility] 6 month follow up  last medicare 4/2025    In 4 months [facility] CT [address] - [place]     In 4 months Maxine Diggs MD [facility] yearly follow up            Recent Outpatient Visits              2 months ago Atrial fibrillation, unspecified type (HCC)    [facility] Jorge Vivas MD    Office Visit    2 months ago Soft tissue lesion of foot    [facility] [address], [place] Krissy Saucedo DPM    Office Visit    7 months ago Nephrolithiasis    [facility] Marla Mendez APRN    Office Visit    8 months ago [facility]    Office Visit    11 months ago Other synovitis and tenosynovitis, right hand    [facility] Allen Nair MD    Office Visit

## 2025-08-27 ENCOUNTER — MED REC SCAN ONLY (OUTPATIENT)
Dept: INTERNAL MEDICINE CLINIC | Facility: CLINIC | Age: 80
End: 2025-08-27

## (undated) DEVICE — SUTURE MONOCRYL 2-0 SH

## (undated) DEVICE — PLASTIC HAND: Brand: MEDLINE INDUSTRIES, INC.

## (undated) DEVICE — NON-ADHERENT PADS PREPACK: Brand: TELFA

## (undated) DEVICE — SOL  .9 1000ML BTL

## (undated) DEVICE — ADH DRMBND PRINEO SKN CLOS TOP

## (undated) DEVICE — ENCORE® LATEX ACCLAIM SIZE 7, STERILE LATEX POWDER-FREE SURGICAL GLOVE: Brand: ENCORE

## (undated) DEVICE — BLADE ELECTRODE: Brand: EDGE

## (undated) DEVICE — Device: Brand: STABLECUT®

## (undated) DEVICE — ESMARK: Brand: DEROYAL

## (undated) DEVICE — GAMMEX® PI HYBRID SIZE 8.5, STERILE POWDER-FREE SURGICAL GLOVE, POLYISOPRENE AND NEOPRENE BLEND: Brand: GAMMEX

## (undated) DEVICE — ZIMMER® STERILE DISPOSABLE TOURNIQUET CUFF WITH PLC, DUAL PORT, DUAL BLADDER, 18 IN. (46 CM)

## (undated) DEVICE — SOLUTION SURG DURA PREP HAZMAT

## (undated) DEVICE — PACK CDS TOTAL HIP

## (undated) DEVICE — MEDI-VAC NON-CONDUCTIVE SUCTION TUBING 6MM X 1.8M (6FT.) L: Brand: CARDINAL HEALTH

## (undated) DEVICE — GAUZE SPONGES,12 PLY: Brand: CURITY

## (undated) DEVICE — DRAPE SHEET LG

## (undated) DEVICE — LINE MNTR ADLT SET O2 INTMD

## (undated) DEVICE — GAMMEX® NON-LATEX PI ORTHO SIZE 8.5, STERILE POLYISOPRENE POWDER-FREE SURGICAL GLOVE: Brand: GAMMEX

## (undated) DEVICE — GAUZE SPONGES: Brand: DEROYAL

## (undated) DEVICE — ENCORE® LATEX MICRO SIZE 8.5, STERILE LATEX POWDER-FREE SURGICAL GLOVE: Brand: ENCORE

## (undated) DEVICE — BOWL CEMENT MIX QUICK-VAC

## (undated) DEVICE — GAMMEX® NON-LATEX PI ORTHO SIZE 6.5, STERILE POLYISOPRENE POWDER-FREE SURGICAL GLOVE: Brand: GAMMEX

## (undated) DEVICE — SUTURE MONOCRYL 2-0 Y945H

## (undated) DEVICE — DISPOSABLE TOURNIQUET CUFF SINGLE BLADDER, DUAL PORT AND QUICK CONNECT CONNECTOR: Brand: COLOR CUFF

## (undated) DEVICE — Device

## (undated) DEVICE — 2T11 #2 PDO 36 X 36: Brand: 2T11 #2 PDO 36 X 36

## (undated) DEVICE — PEN: MARKING STD PT 100/CS: Brand: MEDICAL ACTION INDUSTRIES

## (undated) DEVICE — CONTAINER SPEC STR 4OZ GRY LID

## (undated) DEVICE — SNARE OPTMZ PLPCTM TRP

## (undated) DEVICE — PILLOW FX 56X38X15CM MED HIP

## (undated) DEVICE — BIT DRL 30MM 3.2MM RNGLC ACTB

## (undated) DEVICE — 60 ML SYRINGE LUER-LOCK TIP: Brand: MONOJECT

## (undated) DEVICE — SUTURE ETHIBOND 2 V-37

## (undated) DEVICE — KIT CLEAN ENDOKIT 1.1OZ GOWNX2

## (undated) DEVICE — SHEET,DRAPE,70X100,STERILE: Brand: MEDLINE

## (undated) DEVICE — SOL  .9 3000ML

## (undated) DEVICE — 20 ML SYRINGE LUER-LOCK TIP: Brand: MONOJECT

## (undated) DEVICE — TRAY SKIN PREP PVP-1

## (undated) DEVICE — SUTURE PDS II 1 CT-1

## (undated) DEVICE — 2T8 #2 PDO 24 X 24: Brand: 2T8 #2 PDO 24 X 24

## (undated) DEVICE — ENCORE® LATEX MICRO SIZE 6.5, STERILE LATEX POWDER-FREE SURGICAL GLOVE: Brand: ENCORE

## (undated) DEVICE — DRAPE SRG 70X60IN SPLT U IMPRV

## (undated) DEVICE — EXOFIN FUSION 4X22CM

## (undated) DEVICE — COTTON UNDERCAST PADDING,REGULAR FINISH: Brand: WEBRIL

## (undated) DEVICE — 35 ML SYRINGE REGULAR TIP: Brand: MONOJECT

## (undated) DEVICE — WRAP COOLING KNEE W/ICE PILLOW

## (undated) DEVICE — Device: Brand: JELCO

## (undated) DEVICE — KIT ENDO ORCAPOD 160/180/190

## (undated) DEVICE — TOTAL KNEE: Brand: MEDLINE INDUSTRIES, INC.

## (undated) DEVICE — NEEDLE HPO 18GA 1.5IN ECLPS

## (undated) DEVICE — SNARE CAPTIFLEX MICRO-OVL OLY

## (undated) DEVICE — HOOD: Brand: FLYTE

## (undated) DEVICE — 3M(TM) TEGADERM(TM) TRANSPARENT FILM DRESSING FRAME STYLE 1628: Brand: 3M™ TEGADERM™

## (undated) DEVICE — SUTURE ETHILON 4-0 699G

## (undated) DEVICE — ECTRA II PROCEDURE KIT,                                    SINGLE-USE, DISPOSABLE. CONTENTS                                    PROBE KNIFE, RETROGRADE KNIFE,                                    TRIANGLE KNIFE, HAND PAD, SWABS: Brand: ECTRA

## (undated) DEVICE — STERILE LATEX POWDER-FREE SURGICAL GLOVESWITH NITRILE COATING: Brand: PROTEXIS

## (undated) NOTE — LETTER
[address]  Authorization for Invasive Procedure                                                                                           1. I hereby authorize Esther Romero MD, my physician and his/her assistants (if applicable), which may include medical students, residents, and/or fellows, to perform the following surgical operation/ procedure and administer such anesthesia as may be determined necessary by my physician: Operation/Procedure name (s) COLONOSCOPY on Fadi Castillo   2. I recognize that during the surgical operation/procedure, unforeseen conditions may necessitate additional or different procedures than those listed above. I, therefore, further authorize and request that the above-named surgeon, assistants, or designees perform such procedures as are, in their judgment, necessary and desirable. 3.   My surgeon/physician has discussed prior to my surgery the potential benefits, risks and side effects of this procedure; the likelihood of achieving goals; and potential problems that might occur during recuperation. They also discussed reasonable alternatives to the procedure, including risks, benefits, and side effects related to the alternatives and risks related to not receiving this procedure. I have had all my questions answered and I acknowledge that no guarantee has been made as to the result that may be obtained. 4.   Should the need arise during my operation/procedure, which includes change of level of care prior to discharge, I also consent to the administration of blood and/or blood products. Further, I understand that despite careful testing and screening of blood or blood products by collecting agencies, I may still be subject to ill effects as a result of receiving a blood transfusion and/or blood products.   The following are some, but not all, of the potential risks that can occur: fever and allergic reactions, hemolytic reactions, transmission of diseases such as Hepatitis, AIDS and Cytomegalovirus (CMV) and fluid overload. In the event that I wish to have an autologous transfusion of my own blood, or a directed donor transfusion, I will discuss this with my physician. Check only if Refusing Blood or Blood Products  I understand refusal of blood or blood products as deemed necessary by my physician may have serious consequences to my condition to include possible death. I hereby assume responsibility for my refusal and release the hospital, its personnel, and my physicians from any responsibility for the consequences of my refusal.    o  Refuse   5. I authorize the use of any specimen, organs, tissues, body parts or foreign objects that may be removed from my body during the operation/procedure for diagnosis, research or teaching purposes and their subsequent disposal by hospital authorities. I also authorize the release of specimen test results and/or written reports to my treating physician on the hospital medical staff or other referring or consulting physicians involved in my care, at the discretion of the Pathologist or my treating physician. 6.   I consent to the photographing or videotaping of the operations or procedures to be performed, including appropriate portions of my body for medical, scientific, or educational purposes, provided my identity is not revealed by the pictures or by descriptive texts accompanying them. If the procedure has been photographed/videotaped, the surgeon will obtain the original picture, image, videotape or CD. The hospital will not be responsible for storage, release or maintenance of the picture, image, tape or CD.    7.   I consent to the presence of a  or observers in the operating room as deemed necessary by my physician or their designees.     8.   I recognize that in the event my procedure results in extended X-Ray/fluoroscopy time, I may develop a skin reaction. 9. If I have a Do Not Attempt Resuscitation (DNAR) order in place, that status will be suspended while in the operating room, procedural suite, and during the recovery period unless otherwise explicitly stated by me (or a person authorized to consent on my behalf). The surgeon or my attending physician will determine when the applicable recovery period ends for purposes of reinstating the DNAR order. 10. Patients having a sterilization procedure: I understand that if the procedure is successful the results will be permanent and it will therefore be impossible for me to inseminate, conceive, or bear children. I also understand that the procedure is intended to result in sterility, although the result has not been guaranteed. 11. I acknowledge that my physician has explained sedation/analgesia administration to me including the risk and benefits I consent to the administration of sedation/analgesia as may be necessary or desirable in the judgment of my physician. I CERTIFY THAT I HAVE READ AND FULLY UNDERSTAND THE ABOVE CONSENT TO OPERATION and/or OTHER PROCEDURE.     _________________________________________ _________________________________     ___________________________________  Signature of Patient     Signature of Responsible Person                   Printed Name of Responsible Person                              _________________________________________ ______________________________        ___________________________________  Signature of Witness         Date  Time         Relationship to Patient    STATEMENT OF PHYSICIAN My signature below affirms that prior to the time of the procedure; I have explained to the patient and/or his/her legal representative, the risks and benefits involved in the proposed treatment and any reasonable alternative to the proposed treatment.  I have also explained the risks and benefits involved in refusal of the proposed treatment and alternatives to the proposed treatment and have answered the patient's questions.  If I have a significant financial interest in a co-management agreement or a significant financial interest in any product or implant, or other significant relationship used in this procedure/surgery, I have disclosed this and had a discussion with my patient.     _______________________________________________________________ _____________________________  [facility]                                                                                         (Date)                                   (Time)  Patient Name: Elgin Carroll    : [id number]   Printed: 2022      Medical Record #: [id number]                                              Page 1 of 1

## (undated) NOTE — LETTER
AUTHORIZATION FOR SURGICAL OPERATION OR OTHER PROCEDURE    1.  I hereby authorize CONCHITA Sorensen, and [facility] staff assigned to my case to perform the following operation and/or procedure at the [facility]:    Cortisone i Date:  ______/______/_____                    Time:  ________ A. M.  P.M.        Patient Name:  ______________________________________________________  (please print)      Patient signature:  ___________________________________________________             Re

## (undated) NOTE — LETTER
24      Patient: Robles Chan  : 1945 Visit date: 2024    Dear Jorge,      I examined your patient in follow-up today.    He has recurrent right hand extensor synovitis, post 3 steroid injections.  He needs surgical synovectomy.    He has recent onset atrial fibrillation and has been placed on Xarelto.    Fredy, at what point will we be able to take him off Xarelto 2 days preop and approximately 3 days postop, for surgery?    Thank you for your kind referral. If I may answer any questions, please feel free to contact me.     Sincerely,   Allen Mabry MD     CC: Fredy Goncalves MD

## (undated) NOTE — LETTER
18      Patient: Zia Mustafa  : [birth year] Visit date: 2018    Dear Magda Merlos,      I examined your patient in follow-up today. He is 3 weeks post left endoscopic carpal tunnel release.   Although he has some residual numbnes

## (undated) NOTE — LETTER
18      Patient: Loni Mosley  : [birth year] Visit date: 2018    Dear Tyesha Mancilla,      I examined your patient in consultation today. He has severe left carpal tunnel syndrome clinically and electrodiagnostically.   Unfortunately

## (undated) NOTE — LETTER
23      Patient: Jesús Lindquist  : [birth year] Visit date: 2023    Dear Frida Dale,      I examined your patient in consultation today. He has an area of extensor synovitis of the right hand dorsum. We performed a steroid injection today. Thank you for your kind referral. If I may answer any questions, please feel free to contact me.      Sincerely,   Lei Belle MD     CC:   No Recipients